# Patient Record
Sex: FEMALE | Race: OTHER | Employment: UNEMPLOYED | URBAN - METROPOLITAN AREA
[De-identification: names, ages, dates, MRNs, and addresses within clinical notes are randomized per-mention and may not be internally consistent; named-entity substitution may affect disease eponyms.]

---

## 2018-09-21 ENCOUNTER — OFFICE VISIT (OUTPATIENT)
Dept: PRIMARY CARE CLINIC | Age: 38
End: 2018-09-21

## 2018-09-21 VITALS
OXYGEN SATURATION: 99 % | SYSTOLIC BLOOD PRESSURE: 200 MMHG | HEIGHT: 62 IN | DIASTOLIC BLOOD PRESSURE: 100 MMHG | WEIGHT: 213 LBS | BODY MASS INDEX: 39.2 KG/M2 | TEMPERATURE: 98 F | HEART RATE: 88 BPM

## 2018-09-21 DIAGNOSIS — Z12.4 ENCOUNTER FOR SCREENING FOR CERVICAL CANCER: ICD-10-CM

## 2018-09-21 DIAGNOSIS — D22.9 CHANGE IN MOLE: ICD-10-CM

## 2018-09-21 DIAGNOSIS — Z23 FLU VACCINE NEED: ICD-10-CM

## 2018-09-21 DIAGNOSIS — R63.8 WEIGHT DISORDER: ICD-10-CM

## 2018-09-21 DIAGNOSIS — E55.9 VITAMIN D DEFICIENCY: ICD-10-CM

## 2018-09-21 DIAGNOSIS — I10 ESSENTIAL HYPERTENSION: ICD-10-CM

## 2018-09-21 DIAGNOSIS — R73.9 HYPERGLYCEMIA: ICD-10-CM

## 2018-09-21 DIAGNOSIS — H53.9 VISION ABNORMALITIES: ICD-10-CM

## 2018-09-21 LAB
A/G RATIO: 1.1 (ref 1.1–2.2)
ALBUMIN SERPL-MCNC: 4.1 G/DL (ref 3.4–5)
ALP BLD-CCNC: 104 U/L (ref 40–129)
ALT SERPL-CCNC: 83 U/L (ref 10–40)
ANION GAP SERPL CALCULATED.3IONS-SCNC: 12 MMOL/L (ref 3–16)
AST SERPL-CCNC: 61 U/L (ref 15–37)
BACTERIA: ABNORMAL /HPF
BASOPHILS ABSOLUTE: 0.1 K/UL (ref 0–0.2)
BASOPHILS RELATIVE PERCENT: 0.7 %
BILIRUB SERPL-MCNC: <0.2 MG/DL (ref 0–1)
BILIRUBIN URINE: NEGATIVE
BLOOD, URINE: ABNORMAL
BUN BLDV-MCNC: 14 MG/DL (ref 7–20)
CALCIUM SERPL-MCNC: 9.7 MG/DL (ref 8.3–10.6)
CASTS: ABNORMAL /LPF
CHLORIDE BLD-SCNC: 100 MMOL/L (ref 99–110)
CHOLESTEROL, TOTAL: 250 MG/DL (ref 0–199)
CLARITY: ABNORMAL
CO2: 25 MMOL/L (ref 21–32)
COLOR: ABNORMAL
COMMENT UA: ABNORMAL
CREAT SERPL-MCNC: 0.9 MG/DL (ref 0.6–1.1)
CRYSTALS, UA: ABNORMAL /HPF
EOSINOPHILS ABSOLUTE: 0.1 K/UL (ref 0–0.6)
EOSINOPHILS RELATIVE PERCENT: 0.7 %
EPITHELIAL CELLS, UA: 7 /HPF (ref 0–5)
GFR AFRICAN AMERICAN: >60
GFR NON-AFRICAN AMERICAN: >60
GLOBULIN: 3.6 G/DL
GLUCOSE BLD-MCNC: 122 MG/DL (ref 70–99)
GLUCOSE URINE: NEGATIVE MG/DL
HCG(URINE) PREGNANCY TEST: NEGATIVE
HCT VFR BLD CALC: 40.8 % (ref 36–48)
HDLC SERPL-MCNC: 46 MG/DL (ref 40–60)
HEMOGLOBIN: 12.9 G/DL (ref 12–16)
KETONES, URINE: ABNORMAL MG/DL
LDL CHOLESTEROL CALCULATED: 157 MG/DL
LEUKOCYTE ESTERASE, URINE: ABNORMAL
LYMPHOCYTES ABSOLUTE: 4.2 K/UL (ref 1–5.1)
LYMPHOCYTES RELATIVE PERCENT: 29.9 %
MCH RBC QN AUTO: 24.4 PG (ref 26–34)
MCHC RBC AUTO-ENTMCNC: 31.5 G/DL (ref 31–36)
MCV RBC AUTO: 77.5 FL (ref 80–100)
MICROSCOPIC EXAMINATION: YES
MONOCYTES ABSOLUTE: 0.9 K/UL (ref 0–1.3)
MONOCYTES RELATIVE PERCENT: 6.7 %
NEUTROPHILS ABSOLUTE: 8.7 K/UL (ref 1.7–7.7)
NEUTROPHILS RELATIVE PERCENT: 62 %
NITRITE, URINE: NEGATIVE
PDW BLD-RTO: 16.5 % (ref 12.4–15.4)
PH UA: 6
PLATELET # BLD: 294 K/UL (ref 135–450)
PMV BLD AUTO: 10.6 FL (ref 5–10.5)
POTASSIUM SERPL-SCNC: 4 MMOL/L (ref 3.5–5.1)
PROTEIN UA: >=300 MG/DL
RBC # BLD: 5.27 M/UL (ref 4–5.2)
RBC UA: >900 /HPF (ref 0–4)
SODIUM BLD-SCNC: 137 MMOL/L (ref 136–145)
SPECIFIC GRAVITY UA: >1.03
TOTAL PROTEIN: 7.7 G/DL (ref 6.4–8.2)
TRIGL SERPL-MCNC: 235 MG/DL (ref 0–150)
TSH SERPL DL<=0.05 MIU/L-ACNC: 1.74 UIU/ML (ref 0.27–4.2)
URINE TYPE: ABNORMAL
UROBILINOGEN, URINE: 0.2 E.U./DL
VITAMIN D 25-HYDROXY: 20 NG/ML
VLDLC SERPL CALC-MCNC: 47 MG/DL
WBC # BLD: 14 K/UL (ref 4–11)
WBC UA: 122 /HPF (ref 0–5)

## 2018-09-21 PROCEDURE — 90471 IMMUNIZATION ADMIN: CPT | Performed by: INTERNAL MEDICINE

## 2018-09-21 PROCEDURE — 90715 TDAP VACCINE 7 YRS/> IM: CPT | Performed by: INTERNAL MEDICINE

## 2018-09-21 PROCEDURE — G8427 DOCREV CUR MEDS BY ELIG CLIN: HCPCS | Performed by: INTERNAL MEDICINE

## 2018-09-21 PROCEDURE — G8417 CALC BMI ABV UP PARAM F/U: HCPCS | Performed by: INTERNAL MEDICINE

## 2018-09-21 PROCEDURE — 99204 OFFICE O/P NEW MOD 45 MIN: CPT | Performed by: INTERNAL MEDICINE

## 2018-09-21 PROCEDURE — 1036F TOBACCO NON-USER: CPT | Performed by: INTERNAL MEDICINE

## 2018-09-21 RX ORDER — HYDROCHLOROTHIAZIDE 25 MG/1
25 TABLET ORAL DAILY
Qty: 30 TABLET | Refills: 3 | Status: SHIPPED | OUTPATIENT
Start: 2018-09-21 | End: 2018-10-26 | Stop reason: SDUPTHER

## 2018-09-21 RX ORDER — IBUPROFEN 200 MG
200 TABLET ORAL EVERY 6 HOURS PRN
COMMUNITY
End: 2020-01-15

## 2018-09-21 RX ORDER — AMLODIPINE BESYLATE 5 MG/1
5 TABLET ORAL DAILY
Qty: 30 TABLET | Refills: 3 | Status: SHIPPED | OUTPATIENT
Start: 2018-09-21 | End: 2018-10-26 | Stop reason: SDUPTHER

## 2018-09-21 RX ORDER — M-VIT,TX,IRON,MINS/CALC/FOLIC 27MG-0.4MG
1 TABLET ORAL DAILY
COMMUNITY

## 2018-09-21 ASSESSMENT — ENCOUNTER SYMPTOMS
ABDOMINAL PAIN: 0
COUGH: 0
CONSTIPATION: 0
CHEST TIGHTNESS: 0
WHEEZING: 0
DIARRHEA: 0
GASTROINTESTINAL NEGATIVE: 1
EYES NEGATIVE: 1
BLOOD IN STOOL: 0
ABDOMINAL DISTENTION: 0
SHORTNESS OF BREATH: 0

## 2018-09-21 ASSESSMENT — PATIENT HEALTH QUESTIONNAIRE - PHQ9
1. LITTLE INTEREST OR PLEASURE IN DOING THINGS: 0
SUM OF ALL RESPONSES TO PHQ9 QUESTIONS 1 & 2: 0
SUM OF ALL RESPONSES TO PHQ QUESTIONS 1-9: 0
2. FEELING DOWN, DEPRESSED OR HOPELESS: 0
SUM OF ALL RESPONSES TO PHQ QUESTIONS 1-9: 0

## 2018-09-21 NOTE — PROGRESS NOTES
exhibits no edema. Neurological: She is alert and oriented to person, place, and time. She has normal strength. Skin: Skin is warm. Psychiatric: Her behavior is normal. Judgment normal.   Vitals reviewed. Assessment:      Hattie Mir was seen today for established new doctor, hypertension and mole. Diagnoses and all orders for this visit:    Essential hypertension  Start Norvasc + Hctz . Advised low salt diet   -     amLODIPine (NORVASC) 5 MG tablet; Take 1 tablet by mouth daily  -     hydrochlorothiazide (HYDRODIURIL) 25 MG tablet; Take 1 tablet by mouth daily  -     CBC Auto Differential; Future  -     Comprehensive Metabolic Panel; Future  -     Hemoglobin A1C; Future  -     Lipid Panel; Future  -     TSH without Reflex; Future  -     Urinalysis; Future  -     Pregnancy, Urine; Future  -     Vitamin D 25 Hydroxy; Future    Weight disorder    BMI > 35 . Advised reduced fanny diet     Vision abnormalities  Control BP first   -     Amb External Referral To Ophthalmology    Vitamin D deficiency  -     Vitamin D 25 Hydroxy;  Future    Hyperglycemia  FH of Diabetes   -     Hemoglobin A1C; Future    Encounter for screening for cervical cancer   -     [facility] - Norberto Daily, MD    Change in mole  -     External Referral To Dermatology    Flu vaccine need            Plan:              Tatyana Garzon MD

## 2018-09-22 LAB
ESTIMATED AVERAGE GLUCOSE: 131.2 MG/DL
HBA1C MFR BLD: 6.2 %

## 2018-10-26 ENCOUNTER — OFFICE VISIT (OUTPATIENT)
Dept: PRIMARY CARE CLINIC | Age: 38
End: 2018-10-26
Payer: MEDICARE

## 2018-10-26 VITALS
OXYGEN SATURATION: 98 % | BODY MASS INDEX: 37.91 KG/M2 | WEIGHT: 206 LBS | TEMPERATURE: 97.4 F | HEIGHT: 62 IN | DIASTOLIC BLOOD PRESSURE: 100 MMHG | HEART RATE: 84 BPM | SYSTOLIC BLOOD PRESSURE: 150 MMHG

## 2018-10-26 DIAGNOSIS — I10 ESSENTIAL HYPERTENSION: ICD-10-CM

## 2018-10-26 DIAGNOSIS — E55.9 VITAMIN D DEFICIENCY: ICD-10-CM

## 2018-10-26 DIAGNOSIS — E78.2 MIXED HYPERLIPIDEMIA: ICD-10-CM

## 2018-10-26 DIAGNOSIS — R73.9 HYPERGLYCEMIA: ICD-10-CM

## 2018-10-26 PROCEDURE — G8427 DOCREV CUR MEDS BY ELIG CLIN: HCPCS | Performed by: INTERNAL MEDICINE

## 2018-10-26 PROCEDURE — 1036F TOBACCO NON-USER: CPT | Performed by: INTERNAL MEDICINE

## 2018-10-26 PROCEDURE — G8482 FLU IMMUNIZE ORDER/ADMIN: HCPCS | Performed by: INTERNAL MEDICINE

## 2018-10-26 PROCEDURE — G8417 CALC BMI ABV UP PARAM F/U: HCPCS | Performed by: INTERNAL MEDICINE

## 2018-10-26 PROCEDURE — 99214 OFFICE O/P EST MOD 30 MIN: CPT | Performed by: INTERNAL MEDICINE

## 2018-10-26 RX ORDER — ERGOCALCIFEROL (VITAMIN D2) 1250 MCG
50000 CAPSULE ORAL WEEKLY
Qty: 4 CAPSULE | Refills: 4 | Status: SHIPPED | OUTPATIENT
Start: 2018-10-26 | End: 2020-01-15

## 2018-10-26 RX ORDER — HYDROCHLOROTHIAZIDE 25 MG/1
25 TABLET ORAL DAILY
Qty: 30 TABLET | Refills: 3 | Status: SHIPPED | OUTPATIENT
Start: 2018-10-26 | End: 2018-12-07 | Stop reason: SDUPTHER

## 2018-10-26 RX ORDER — AMLODIPINE BESYLATE 10 MG/1
10 TABLET ORAL DAILY
Qty: 30 TABLET | Refills: 2 | Status: SHIPPED | OUTPATIENT
Start: 2018-10-26 | End: 2018-12-07 | Stop reason: SDUPTHER

## 2018-10-26 ASSESSMENT — ENCOUNTER SYMPTOMS
ABDOMINAL PAIN: 0
COUGH: 0
GASTROINTESTINAL NEGATIVE: 1
ABDOMINAL DISTENTION: 0
EYES NEGATIVE: 1
CHEST TIGHTNESS: 0
BLOOD IN STOOL: 0
CONSTIPATION: 0
SHORTNESS OF BREATH: 0
WHEEZING: 0
DIARRHEA: 0

## 2018-12-07 ENCOUNTER — OFFICE VISIT (OUTPATIENT)
Dept: PRIMARY CARE CLINIC | Age: 38
End: 2018-12-07
Payer: COMMERCIAL

## 2018-12-07 VITALS
DIASTOLIC BLOOD PRESSURE: 90 MMHG | BODY MASS INDEX: 38.46 KG/M2 | WEIGHT: 209 LBS | SYSTOLIC BLOOD PRESSURE: 135 MMHG | HEIGHT: 62 IN

## 2018-12-07 DIAGNOSIS — R63.8 WEIGHT DISORDER: ICD-10-CM

## 2018-12-07 DIAGNOSIS — I10 ESSENTIAL HYPERTENSION: ICD-10-CM

## 2018-12-07 PROCEDURE — 99213 OFFICE O/P EST LOW 20 MIN: CPT | Performed by: INTERNAL MEDICINE

## 2018-12-07 RX ORDER — AMLODIPINE BESYLATE 10 MG/1
10 TABLET ORAL DAILY
Qty: 90 TABLET | Refills: 2 | Status: SHIPPED | OUTPATIENT
Start: 2018-12-07 | End: 2019-02-12 | Stop reason: SDUPTHER

## 2018-12-07 RX ORDER — HYDROCHLOROTHIAZIDE 25 MG/1
25 TABLET ORAL DAILY
Qty: 90 TABLET | Refills: 2 | Status: SHIPPED | OUTPATIENT
Start: 2018-12-07 | End: 2019-02-12

## 2018-12-07 ASSESSMENT — ENCOUNTER SYMPTOMS
WHEEZING: 0
CHEST TIGHTNESS: 0
ABDOMINAL PAIN: 0
ABDOMINAL DISTENTION: 0
BLOOD IN STOOL: 0
COUGH: 0
EYES NEGATIVE: 1
SHORTNESS OF BREATH: 0
CONSTIPATION: 0
DIARRHEA: 0
GASTROINTESTINAL NEGATIVE: 1

## 2019-02-08 ENCOUNTER — TELEPHONE (OUTPATIENT)
Dept: PRIMARY CARE CLINIC | Age: 39
End: 2019-02-08

## 2019-02-12 ENCOUNTER — OFFICE VISIT (OUTPATIENT)
Dept: PRIMARY CARE CLINIC | Age: 39
End: 2019-02-12
Payer: COMMERCIAL

## 2019-02-12 VITALS
BODY MASS INDEX: 39.38 KG/M2 | TEMPERATURE: 98.1 F | OXYGEN SATURATION: 97 % | HEIGHT: 62 IN | SYSTOLIC BLOOD PRESSURE: 150 MMHG | DIASTOLIC BLOOD PRESSURE: 95 MMHG | WEIGHT: 214 LBS | HEART RATE: 98 BPM

## 2019-02-12 DIAGNOSIS — Z01.818 PREOP EXAMINATION: ICD-10-CM

## 2019-02-12 DIAGNOSIS — R63.8 WEIGHT DISORDER: ICD-10-CM

## 2019-02-12 DIAGNOSIS — I10 ESSENTIAL HYPERTENSION: ICD-10-CM

## 2019-02-12 LAB
A/G RATIO: 1 (ref 1.1–2.2)
ALBUMIN SERPL-MCNC: 3.9 G/DL (ref 3.4–5)
ALP BLD-CCNC: 94 U/L (ref 40–129)
ALT SERPL-CCNC: 103 U/L (ref 10–40)
ANION GAP SERPL CALCULATED.3IONS-SCNC: 15 MMOL/L (ref 3–16)
AST SERPL-CCNC: 61 U/L (ref 15–37)
BASOPHILS ABSOLUTE: 0.2 K/UL (ref 0–0.2)
BASOPHILS RELATIVE PERCENT: 1.2 %
BILIRUB SERPL-MCNC: 0.3 MG/DL (ref 0–1)
BUN BLDV-MCNC: 11 MG/DL (ref 7–20)
CALCIUM SERPL-MCNC: 10.3 MG/DL (ref 8.3–10.6)
CHLORIDE BLD-SCNC: 93 MMOL/L (ref 99–110)
CO2: 28 MMOL/L (ref 21–32)
CREAT SERPL-MCNC: 0.7 MG/DL (ref 0.6–1.1)
EOSINOPHILS ABSOLUTE: 0.1 K/UL (ref 0–0.6)
EOSINOPHILS RELATIVE PERCENT: 0.8 %
GFR AFRICAN AMERICAN: >60
GFR NON-AFRICAN AMERICAN: >60
GLOBULIN: 3.9 G/DL
GLUCOSE BLD-MCNC: 142 MG/DL (ref 70–99)
HCT VFR BLD CALC: 44.9 % (ref 36–48)
HEMOGLOBIN: 14.6 G/DL (ref 12–16)
LYMPHOCYTES ABSOLUTE: 3.8 K/UL (ref 1–5.1)
LYMPHOCYTES RELATIVE PERCENT: 26.8 %
MCH RBC QN AUTO: 27.1 PG (ref 26–34)
MCHC RBC AUTO-ENTMCNC: 32.5 G/DL (ref 31–36)
MCV RBC AUTO: 83.5 FL (ref 80–100)
MONOCYTES ABSOLUTE: 0.9 K/UL (ref 0–1.3)
MONOCYTES RELATIVE PERCENT: 6.5 %
NEUTROPHILS ABSOLUTE: 9 K/UL (ref 1.7–7.7)
NEUTROPHILS RELATIVE PERCENT: 64.7 %
PDW BLD-RTO: 15.9 % (ref 12.4–15.4)
PLATELET # BLD: 361 K/UL (ref 135–450)
PMV BLD AUTO: 10.5 FL (ref 5–10.5)
POTASSIUM SERPL-SCNC: 3.8 MMOL/L (ref 3.5–5.1)
RBC # BLD: 5.38 M/UL (ref 4–5.2)
SODIUM BLD-SCNC: 136 MMOL/L (ref 136–145)
TOTAL PROTEIN: 7.8 G/DL (ref 6.4–8.2)
WBC # BLD: 14 K/UL (ref 4–11)

## 2019-02-12 PROCEDURE — 99214 OFFICE O/P EST MOD 30 MIN: CPT | Performed by: INTERNAL MEDICINE

## 2019-02-12 RX ORDER — AMLODIPINE BESYLATE 10 MG/1
10 TABLET ORAL DAILY
Qty: 90 TABLET | Refills: 2 | Status: SHIPPED | OUTPATIENT
Start: 2019-02-12 | End: 2019-03-29 | Stop reason: SDUPTHER

## 2019-02-12 RX ORDER — LOSARTAN POTASSIUM AND HYDROCHLOROTHIAZIDE 25; 100 MG/1; MG/1
1 TABLET ORAL DAILY
Qty: 30 TABLET | Refills: 3 | Status: SHIPPED | OUTPATIENT
Start: 2019-02-12 | End: 2019-03-29 | Stop reason: SDUPTHER

## 2019-02-12 ASSESSMENT — PATIENT HEALTH QUESTIONNAIRE - PHQ9
2. FEELING DOWN, DEPRESSED OR HOPELESS: 0
SUM OF ALL RESPONSES TO PHQ QUESTIONS 1-9: 0
SUM OF ALL RESPONSES TO PHQ9 QUESTIONS 1 & 2: 0
SUM OF ALL RESPONSES TO PHQ QUESTIONS 1-9: 0
1. LITTLE INTEREST OR PLEASURE IN DOING THINGS: 0

## 2019-02-12 ASSESSMENT — ENCOUNTER SYMPTOMS
GASTROINTESTINAL NEGATIVE: 1
ABDOMINAL DISTENTION: 0
COUGH: 0
WHEEZING: 0
BLOOD IN STOOL: 0
CONSTIPATION: 0
SHORTNESS OF BREATH: 0
ABDOMINAL PAIN: 0
CHEST TIGHTNESS: 0
DIARRHEA: 0
EYES NEGATIVE: 1

## 2019-03-29 ENCOUNTER — OFFICE VISIT (OUTPATIENT)
Dept: PRIMARY CARE CLINIC | Age: 39
End: 2019-03-29
Payer: COMMERCIAL

## 2019-03-29 VITALS
SYSTOLIC BLOOD PRESSURE: 120 MMHG | DIASTOLIC BLOOD PRESSURE: 70 MMHG | HEART RATE: 97 BPM | HEIGHT: 62 IN | BODY MASS INDEX: 40.12 KG/M2 | WEIGHT: 218 LBS

## 2019-03-29 DIAGNOSIS — I10 ESSENTIAL HYPERTENSION: ICD-10-CM

## 2019-03-29 PROCEDURE — 99213 OFFICE O/P EST LOW 20 MIN: CPT | Performed by: INTERNAL MEDICINE

## 2019-03-29 RX ORDER — AMLODIPINE BESYLATE 10 MG/1
10 TABLET ORAL DAILY
Qty: 90 TABLET | Refills: 5 | Status: SHIPPED | OUTPATIENT
Start: 2019-03-29 | End: 2020-01-15 | Stop reason: SDUPTHER

## 2019-03-29 RX ORDER — LOSARTAN POTASSIUM AND HYDROCHLOROTHIAZIDE 25; 100 MG/1; MG/1
1 TABLET ORAL DAILY
Qty: 90 TABLET | Refills: 5 | Status: SHIPPED | OUTPATIENT
Start: 2019-03-29 | End: 2020-01-15

## 2019-03-29 ASSESSMENT — ENCOUNTER SYMPTOMS
SHORTNESS OF BREATH: 0
DIARRHEA: 0
CHEST TIGHTNESS: 0
EYES NEGATIVE: 1
ABDOMINAL DISTENTION: 0
COUGH: 0
BLOOD IN STOOL: 0
ABDOMINAL PAIN: 0
CONSTIPATION: 0
WHEEZING: 0
GASTROINTESTINAL NEGATIVE: 1

## 2019-11-22 ENCOUNTER — NURSE TRIAGE (OUTPATIENT)
Dept: OTHER | Facility: CLINIC | Age: 39
End: 2019-11-22

## 2019-11-22 RX ORDER — GUAIFENESIN 600 MG/1
600 TABLET, EXTENDED RELEASE ORAL 2 TIMES DAILY
Qty: 14 TABLET | Refills: 0 | Status: SHIPPED | OUTPATIENT
Start: 2019-11-22 | End: 2019-11-29

## 2019-11-22 RX ORDER — AZITHROMYCIN 250 MG/1
250 TABLET, FILM COATED ORAL SEE ADMIN INSTRUCTIONS
Qty: 6 TABLET | Refills: 0 | Status: SHIPPED | OUTPATIENT
Start: 2019-11-22 | End: 2019-11-27

## 2019-11-27 ENCOUNTER — TELEPHONE (OUTPATIENT)
Dept: PRIMARY CARE CLINIC | Age: 39
End: 2019-11-27

## 2019-12-26 ENCOUNTER — TELEPHONE (OUTPATIENT)
Dept: PRIMARY CARE CLINIC | Age: 39
End: 2019-12-26

## 2019-12-31 DIAGNOSIS — J06.9 URI, ACUTE: Primary | ICD-10-CM

## 2019-12-31 RX ORDER — AMOXICILLIN 500 MG/1
500 CAPSULE ORAL 3 TIMES DAILY
Qty: 21 CAPSULE | Refills: 0 | Status: SHIPPED | OUTPATIENT
Start: 2019-12-31 | End: 2020-01-07

## 2019-12-31 RX ORDER — GUAIFENESIN 600 MG/1
600 TABLET, EXTENDED RELEASE ORAL 2 TIMES DAILY
Qty: 20 TABLET | Refills: 0 | Status: SHIPPED | OUTPATIENT
Start: 2019-12-31 | End: 2020-01-10

## 2020-01-15 ENCOUNTER — OFFICE VISIT (OUTPATIENT)
Dept: PRIMARY CARE CLINIC | Age: 40
End: 2020-01-15
Payer: COMMERCIAL

## 2020-01-15 VITALS
DIASTOLIC BLOOD PRESSURE: 102 MMHG | SYSTOLIC BLOOD PRESSURE: 161 MMHG | HEART RATE: 95 BPM | WEIGHT: 224 LBS | BODY MASS INDEX: 41.22 KG/M2 | HEIGHT: 62 IN

## 2020-01-15 PROCEDURE — G0008 ADMIN INFLUENZA VIRUS VAC: HCPCS | Performed by: INTERNAL MEDICINE

## 2020-01-15 PROCEDURE — 90686 IIV4 VACC NO PRSV 0.5 ML IM: CPT | Performed by: INTERNAL MEDICINE

## 2020-01-15 PROCEDURE — 99214 OFFICE O/P EST MOD 30 MIN: CPT | Performed by: INTERNAL MEDICINE

## 2020-01-15 RX ORDER — AMLODIPINE BESYLATE 10 MG/1
10 TABLET ORAL DAILY
Qty: 90 TABLET | Refills: 5 | Status: SHIPPED | OUTPATIENT
Start: 2020-01-15 | End: 2020-03-04 | Stop reason: SDUPTHER

## 2020-01-15 RX ORDER — HYDROCHLOROTHIAZIDE 25 MG/1
25 TABLET ORAL EVERY MORNING
Qty: 30 TABLET | Refills: 5 | Status: SHIPPED | OUTPATIENT
Start: 2020-01-15 | End: 2020-03-04 | Stop reason: SDUPTHER

## 2020-01-15 ASSESSMENT — PATIENT HEALTH QUESTIONNAIRE - PHQ9
SUM OF ALL RESPONSES TO PHQ9 QUESTIONS 1 & 2: 0
1. LITTLE INTEREST OR PLEASURE IN DOING THINGS: 0
2. FEELING DOWN, DEPRESSED OR HOPELESS: 0
SUM OF ALL RESPONSES TO PHQ QUESTIONS 1-9: 0
SUM OF ALL RESPONSES TO PHQ QUESTIONS 1-9: 0

## 2020-01-15 ASSESSMENT — ENCOUNTER SYMPTOMS
ABDOMINAL PAIN: 0
BLOOD IN STOOL: 0
CHEST TIGHTNESS: 0
DIARRHEA: 0
EYES NEGATIVE: 1
GASTROINTESTINAL NEGATIVE: 1
CONSTIPATION: 0
ABDOMINAL DISTENTION: 0
WHEEZING: 0
SHORTNESS OF BREATH: 0
COUGH: 0

## 2020-01-15 NOTE — PROGRESS NOTES
Subjective:      Patient ID: Dorita Jack is a 42 y.o. female. 1/15/2020 Patient presents with:  Hypertension  Check-Up            3/29/19 Patient presents with:  Hypertension: tolerating hyzaar fine             Last seen  2/12/19 Patient presents with:  Pre-op Exam: Pre-op exam surgery scheduled 2/15/19 left ear lesion Dr Yan Velasquez    Never had any surgery before     No FH of anaesthesia problems   No personal or FH of bleeding diorders          9/21/18 Patient presents with:  Established New Doctor        Hypertension   Pertinent negatives include no chest pain, headaches or shortness of breath. Review of Systems   Constitutional: Negative for activity change, appetite change, fatigue, fever and unexpected weight change. Flu vac  1/20    Tdap  9/18   HENT: Negative. Dental care reg    Eyes: Negative. Negative for visual disturbance. Eye exam >>. Respiratory: Negative for cough, chest tightness, shortness of breath and wheezing. Does not smoke . No Etoh   No rec drugs     No Asthma    Cardiovascular: Negative. Negative for chest pain and leg swelling. HTN  > 5 yrs ,     FH of HTN ; No FH of CAD    Gastrointestinal: Negative. Negative for abdominal distention, abdominal pain, blood in stool, constipation and diarrhea. No FH of Ca colon    Endocrine:        Dad with Diabetes    Genitourinary: Negative for dysuria, frequency, menstrual problem, urgency and vaginal discharge. LMP 12/15/19     No Children     Reg menstrual cycles         No FH of breast cancer    Musculoskeletal: Negative. Skin:        S/p surgery for Mole behind left ear   Allergic/Immunologic: Negative for environmental allergies and food allergies. Neurological: Negative for dizziness, weakness and headaches. Psychiatric/Behavioral: Negative for behavioral problems, decreased concentration and sleep disturbance. The patient is not nervous/anxious.         Objective:   Physical

## 2020-03-04 ENCOUNTER — OFFICE VISIT (OUTPATIENT)
Dept: PRIMARY CARE CLINIC | Age: 40
End: 2020-03-04
Payer: COMMERCIAL

## 2020-03-04 VITALS
HEIGHT: 62 IN | SYSTOLIC BLOOD PRESSURE: 140 MMHG | BODY MASS INDEX: 40.3 KG/M2 | WEIGHT: 219 LBS | DIASTOLIC BLOOD PRESSURE: 100 MMHG | HEART RATE: 96 BPM

## 2020-03-04 LAB
A/G RATIO: 1 (ref 1.1–2.2)
ALBUMIN SERPL-MCNC: 3.5 G/DL (ref 3.4–5)
ALP BLD-CCNC: 110 U/L (ref 40–129)
ALT SERPL-CCNC: 154 U/L (ref 10–40)
ANION GAP SERPL CALCULATED.3IONS-SCNC: 16 MMOL/L (ref 3–16)
AST SERPL-CCNC: 110 U/L (ref 15–37)
BACTERIA: ABNORMAL /HPF
BASOPHILS ABSOLUTE: 0.1 K/UL (ref 0–0.2)
BASOPHILS RELATIVE PERCENT: 0.6 %
BILIRUB SERPL-MCNC: 0.3 MG/DL (ref 0–1)
BILIRUBIN URINE: NEGATIVE
BLOOD, URINE: ABNORMAL
BUN BLDV-MCNC: 12 MG/DL (ref 7–20)
CALCIUM SERPL-MCNC: 10.2 MG/DL (ref 8.3–10.6)
CHLORIDE BLD-SCNC: 96 MMOL/L (ref 99–110)
CHOLESTEROL, TOTAL: 334 MG/DL (ref 0–199)
CLARITY: ABNORMAL
CO2: 26 MMOL/L (ref 21–32)
COLOR: YELLOW
CREAT SERPL-MCNC: 0.8 MG/DL (ref 0.6–1.1)
EOSINOPHILS ABSOLUTE: 0.1 K/UL (ref 0–0.6)
EOSINOPHILS RELATIVE PERCENT: 0.9 %
EPITHELIAL CELLS, UA: 7 /HPF (ref 0–5)
GFR AFRICAN AMERICAN: >60
GFR NON-AFRICAN AMERICAN: >60
GLOBULIN: 3.6 G/DL
GLUCOSE BLD-MCNC: 154 MG/DL (ref 70–99)
GLUCOSE URINE: NEGATIVE MG/DL
HCG(URINE) PREGNANCY TEST: NEGATIVE
HCT VFR BLD CALC: 45.1 % (ref 36–48)
HDLC SERPL-MCNC: 53 MG/DL (ref 40–60)
HEMOGLOBIN: 14.7 G/DL (ref 12–16)
HYALINE CASTS: 10 /LPF (ref 0–8)
KETONES, URINE: NEGATIVE MG/DL
LDL CHOLESTEROL CALCULATED: ABNORMAL MG/DL
LDL CHOLESTEROL DIRECT: 272 MG/DL
LEUKOCYTE ESTERASE, URINE: ABNORMAL
LYMPHOCYTES ABSOLUTE: 4.7 K/UL (ref 1–5.1)
LYMPHOCYTES RELATIVE PERCENT: 34.3 %
MCH RBC QN AUTO: 27.8 PG (ref 26–34)
MCHC RBC AUTO-ENTMCNC: 32.7 G/DL (ref 31–36)
MCV RBC AUTO: 85.1 FL (ref 80–100)
MICROSCOPIC EXAMINATION: YES
MONOCYTES ABSOLUTE: 0.8 K/UL (ref 0–1.3)
MONOCYTES RELATIVE PERCENT: 5.8 %
NEUTROPHILS ABSOLUTE: 8 K/UL (ref 1.7–7.7)
NEUTROPHILS RELATIVE PERCENT: 58.4 %
NITRITE, URINE: NEGATIVE
PDW BLD-RTO: 14.5 % (ref 12.4–15.4)
PH UA: 6.5 (ref 5–8)
PLATELET # BLD: 326 K/UL (ref 135–450)
PMV BLD AUTO: 11 FL (ref 5–10.5)
POTASSIUM SERPL-SCNC: 3.7 MMOL/L (ref 3.5–5.1)
PROTEIN UA: >=300 MG/DL
RBC # BLD: 5.29 M/UL (ref 4–5.2)
RBC UA: 5 /HPF (ref 0–4)
SODIUM BLD-SCNC: 138 MMOL/L (ref 136–145)
SPECIFIC GRAVITY UA: 1.02 (ref 1–1.03)
TOTAL PROTEIN: 7.1 G/DL (ref 6.4–8.2)
TRIGL SERPL-MCNC: 302 MG/DL (ref 0–150)
TSH SERPL DL<=0.05 MIU/L-ACNC: 2.14 UIU/ML (ref 0.27–4.2)
URINE TYPE: ABNORMAL
UROBILINOGEN, URINE: 0.2 E.U./DL
VITAMIN D 25-HYDROXY: 13.9 NG/ML
VLDLC SERPL CALC-MCNC: ABNORMAL MG/DL
WBC # BLD: 13.8 K/UL (ref 4–11)
WBC UA: 44 /HPF (ref 0–5)

## 2020-03-04 PROCEDURE — 99213 OFFICE O/P EST LOW 20 MIN: CPT | Performed by: INTERNAL MEDICINE

## 2020-03-04 RX ORDER — LOSARTAN POTASSIUM 100 MG/1
100 TABLET ORAL DAILY
Qty: 30 TABLET | Refills: 5 | Status: SHIPPED | OUTPATIENT
Start: 2020-03-04 | End: 2020-08-27

## 2020-03-04 RX ORDER — AMLODIPINE BESYLATE 10 MG/1
10 TABLET ORAL DAILY
Qty: 90 TABLET | Refills: 5 | Status: SHIPPED | OUTPATIENT
Start: 2020-03-04 | End: 2021-02-10 | Stop reason: SDUPTHER

## 2020-03-04 RX ORDER — HYDROCHLOROTHIAZIDE 25 MG/1
25 TABLET ORAL EVERY MORNING
Qty: 30 TABLET | Refills: 5 | Status: SHIPPED | OUTPATIENT
Start: 2020-03-04 | End: 2021-01-26 | Stop reason: SDUPTHER

## 2020-03-04 ASSESSMENT — ENCOUNTER SYMPTOMS
CONSTIPATION: 0
ABDOMINAL DISTENTION: 0
BLOOD IN STOOL: 0
EYES NEGATIVE: 1
WHEEZING: 0
DIARRHEA: 0
SHORTNESS OF BREATH: 0
GASTROINTESTINAL NEGATIVE: 1
CHEST TIGHTNESS: 0
ABDOMINAL PAIN: 0
COUGH: 0

## 2020-03-04 NOTE — PROGRESS NOTES
Subjective:      Patient ID: Leatha Harrison is a 42 y.o. female. 3/4/2020 Patient presents with:  1 Month Follow-Up    Taking Hctz with Norvasc . No issues      Got labs ordered last visit done today ! Last seen  1/15/2020 Patient presents with:  Hypertension  Check-Up            3/29/19 Patient presents with:  Hypertension: tolerating hyzaar fine             Last seen  2/12/19 Patient presents with:  Pre-op Exam: Pre-op exam surgery scheduled 2/15/19 left ear lesion Dr Elzbieta Cadet    Never had any surgery before     No FH of anaesthesia problems   No personal or FH of bleeding diorders          9/21/18 Patient presents with:  Established New Doctor        Hypertension   Pertinent negatives include no chest pain, headaches or shortness of breath. Review of Systems   Constitutional: Negative for activity change, appetite change, fatigue, fever and unexpected weight change. Flu vac  1/20    Tdap  9/18   HENT: Negative. Dental care reg    Eyes: Negative. Negative for visual disturbance. Eye exam >>. Respiratory: Negative for cough, chest tightness, shortness of breath and wheezing. Does not smoke . No Etoh   No rec drugs     No Asthma    Cardiovascular: Negative. Negative for chest pain and leg swelling. HTN  > 5 yrs ,     FH of HTN ; No FH of CAD    Gastrointestinal: Negative. Negative for abdominal distention, abdominal pain, blood in stool, constipation and diarrhea. No FH of Ca colon    Endocrine:        Dad with Diabetes    Genitourinary: Negative for dysuria, frequency, menstrual problem, urgency and vaginal discharge. LMP 12/15/19     No Children     Reg menstrual cycles         No FH of breast cancer    Musculoskeletal: Negative. Skin:        S/p surgery for Mole behind left ear   Allergic/Immunologic: Negative for environmental allergies and food allergies. Neurological: Negative for dizziness, weakness and headaches.

## 2020-03-05 LAB
ESTIMATED AVERAGE GLUCOSE: 205.9 MG/DL
HBA1C MFR BLD: 8.8 %

## 2020-03-05 RX ORDER — METFORMIN HYDROCHLORIDE 500 MG/1
500 TABLET, EXTENDED RELEASE ORAL
Qty: 30 TABLET | Refills: 3 | Status: SHIPPED | OUTPATIENT
Start: 2020-03-05 | End: 2020-05-22 | Stop reason: SDUPTHER

## 2020-03-05 RX ORDER — ERGOCALCIFEROL 1.25 MG/1
50000 CAPSULE ORAL WEEKLY
Qty: 12 CAPSULE | Refills: 1 | Status: SHIPPED | OUTPATIENT
Start: 2020-03-05 | End: 2021-02-10 | Stop reason: SDUPTHER

## 2020-03-05 RX ORDER — ATORVASTATIN CALCIUM 40 MG/1
40 TABLET, FILM COATED ORAL DAILY
Qty: 90 TABLET | Refills: 5 | Status: SHIPPED | OUTPATIENT
Start: 2020-03-05 | End: 2021-02-10 | Stop reason: SDUPTHER

## 2020-05-22 RX ORDER — METFORMIN HYDROCHLORIDE 500 MG/1
500 TABLET, EXTENDED RELEASE ORAL
Qty: 30 TABLET | Refills: 3 | Status: SHIPPED | OUTPATIENT
Start: 2020-05-22 | End: 2020-10-20

## 2020-08-27 RX ORDER — LOSARTAN POTASSIUM 100 MG/1
TABLET ORAL
Qty: 30 TABLET | Refills: 4 | Status: SHIPPED | OUTPATIENT
Start: 2020-08-27 | End: 2021-02-01

## 2020-10-20 RX ORDER — METFORMIN HYDROCHLORIDE 500 MG/1
TABLET, EXTENDED RELEASE ORAL
Qty: 30 TABLET | Refills: 2 | Status: SHIPPED | OUTPATIENT
Start: 2020-10-20 | End: 2021-01-21

## 2020-10-20 NOTE — TELEPHONE ENCOUNTER
Medication:   Requested Prescriptions     Pending Prescriptions Disp Refills    metFORMIN (GLUCOPHAGE-XR) 500 MG extended release tablet [Pharmacy Med Name: metFORMIN HCL  MG TABLET] 30 tablet 2     Sig: TAKE ONE TABLET BY MOUTH DAILY WITH BREAKFAST       Last Filled:      Patient Phone Number: [phone] (home)     Last appt: 3/4/2020   Next appt: Visit date not found    Last Labs DM:   Lab Results   Component Value Date    LABA1C 8.8 03/04/2020

## 2020-11-02 ENCOUNTER — TELEPHONE (OUTPATIENT)
Dept: ADMINISTRATIVE | Age: 40
End: 2020-11-02

## 2021-01-21 DIAGNOSIS — E11.9 NEWLY DIAGNOSED DIABETES (HCC): ICD-10-CM

## 2021-01-21 RX ORDER — METFORMIN HYDROCHLORIDE 500 MG/1
TABLET, EXTENDED RELEASE ORAL
Qty: 90 TABLET | Refills: 1 | Status: SHIPPED | OUTPATIENT
Start: 2021-01-21 | End: 2021-02-10 | Stop reason: SDUPTHER

## 2021-01-21 NOTE — TELEPHONE ENCOUNTER
Medication:   Requested Prescriptions     Pending Prescriptions Disp Refills    metFORMIN (GLUCOPHAGE-XR) 500 MG extended release tablet [Pharmacy Med Name: metFORMIN HCL  MG TABLET] 90 tablet 1     Sig: TAKE ONE TABLET BY MOUTH DAILY WITH BREAKFAST        Last Filled:      Patient Phone Number: [phone] (home)     Last appt: 3/4/2020   Next appt: Visit date not found    Last OARRS: No flowsheet data found.

## 2021-01-21 NOTE — TELEPHONE ENCOUNTER
Medication:   Requested Prescriptions     Pending Prescriptions Disp Refills    metFORMIN (GLUCOPHAGE-XR) 500 MG extended release tablet [Pharmacy Med Name: metFORMIN HCL  MG TABLET] 90 tablet 1     Sig: TAKE ONE TABLET BY MOUTH DAILY WITH BREAKFAST        Last Filled:      Patient Phone Number: [phone] (home)     Last appt: 3/4/2020   Next appt: Visit date not found    Last OARRS: No flowsheet data found.

## 2021-01-26 DIAGNOSIS — I10 ESSENTIAL HYPERTENSION: ICD-10-CM

## 2021-01-26 RX ORDER — HYDROCHLOROTHIAZIDE 25 MG/1
25 TABLET ORAL EVERY MORNING
Qty: 30 TABLET | Refills: 5 | Status: SHIPPED | OUTPATIENT
Start: 2021-01-26 | End: 2021-02-10

## 2021-01-26 NOTE — TELEPHONE ENCOUNTER
----- Message from Seth Miller sent at 1/26/2021 11:13 AM EST -----  Subject: Refill Request    QUESTIONS  Name of Medication? hydroCHLOROthiazide (HYDRODIURIL) 25 MG tablet  Patient-reported dosage and instructions? 1 tablet per morning  How many days do you have left? Unknown  Preferred Pharmacy? [facility]  Pharmacy phone number (if available)? [phone]  Additional Information for Provider? 90 day  ---------------------------------------------------------------------------  --------------  CALL BACK INFO  What is the best way for the office to contact you? OK to leave message on   voicemail   OK to respond with electronic message via [employer] portal (only for   patients who have registered [employer] account)  Preferred Call Back Phone Number?  [phone]

## 2021-01-31 DIAGNOSIS — I10 ESSENTIAL HYPERTENSION: ICD-10-CM

## 2021-02-01 RX ORDER — LOSARTAN POTASSIUM 100 MG/1
TABLET ORAL
Qty: 30 TABLET | Refills: 3 | Status: SHIPPED | OUTPATIENT
Start: 2021-02-01 | End: 2021-02-10

## 2021-02-01 NOTE — TELEPHONE ENCOUNTER
Medication:   Requested Prescriptions     Pending Prescriptions Disp Refills    losartan (COZAAR) 100 MG tablet [Pharmacy Med Name: LOSARTAN POTASSIUM 100 MG TAB] 30 tablet 3     Sig: TAKE ONE TABLET BY MOUTH DAILY        Last Filled:      Patient Phone Number: [phone] (home)     Last appt: 3/4/2020   Next appt: 2/10/2021    Last OARRS: No flowsheet data found.

## 2021-02-10 ENCOUNTER — VIRTUAL VISIT (OUTPATIENT)
Dept: PRIMARY CARE CLINIC | Age: 41
End: 2021-02-10
Payer: COMMERCIAL

## 2021-02-10 DIAGNOSIS — I10 ESSENTIAL HYPERTENSION: ICD-10-CM

## 2021-02-10 DIAGNOSIS — E78.2 MIXED HYPERLIPIDEMIA: ICD-10-CM

## 2021-02-10 DIAGNOSIS — Z86.39 H/O VITAMIN D DEFICIENCY: ICD-10-CM

## 2021-02-10 DIAGNOSIS — R74.8 ELEVATED LIVER ENZYMES: ICD-10-CM

## 2021-02-10 DIAGNOSIS — E11.9 NEWLY DIAGNOSED DIABETES (HCC): ICD-10-CM

## 2021-02-10 DIAGNOSIS — Z71.85 VACCINE COUNSELING: ICD-10-CM

## 2021-02-10 PROCEDURE — 99214 OFFICE O/P EST MOD 30 MIN: CPT | Performed by: INTERNAL MEDICINE

## 2021-02-10 RX ORDER — ERGOCALCIFEROL 1.25 MG/1
50000 CAPSULE ORAL WEEKLY
Qty: 12 CAPSULE | Refills: 1 | Status: SHIPPED | OUTPATIENT
Start: 2021-02-10

## 2021-02-10 RX ORDER — ATORVASTATIN CALCIUM 40 MG/1
40 TABLET, FILM COATED ORAL DAILY
Qty: 90 TABLET | Refills: 5 | Status: SHIPPED | OUTPATIENT
Start: 2021-02-10

## 2021-02-10 RX ORDER — METFORMIN HYDROCHLORIDE 500 MG/1
500 TABLET, EXTENDED RELEASE ORAL
Qty: 90 TABLET | Refills: 3 | Status: SHIPPED | OUTPATIENT
Start: 2021-02-10

## 2021-02-10 RX ORDER — LOSARTAN POTASSIUM AND HYDROCHLOROTHIAZIDE 25; 100 MG/1; MG/1
1 TABLET ORAL DAILY
Qty: 90 TABLET | Refills: 2 | Status: SHIPPED | OUTPATIENT
Start: 2021-02-10

## 2021-02-10 RX ORDER — AMLODIPINE BESYLATE 10 MG/1
10 TABLET ORAL DAILY
Qty: 90 TABLET | Refills: 5 | Status: SHIPPED | OUTPATIENT
Start: 2021-02-10

## 2021-02-10 ASSESSMENT — ENCOUNTER SYMPTOMS
COUGH: 0
ABDOMINAL DISTENTION: 0
SHORTNESS OF BREATH: 0
CHEST TIGHTNESS: 0
GASTROINTESTINAL NEGATIVE: 1
BLOOD IN STOOL: 0
DIARRHEA: 0
ABDOMINAL PAIN: 0
WHEEZING: 0
CONSTIPATION: 0
EYES NEGATIVE: 1

## 2021-02-10 ASSESSMENT — PATIENT HEALTH QUESTIONNAIRE - PHQ9
SUM OF ALL RESPONSES TO PHQ QUESTIONS 1-9: 0
SUM OF ALL RESPONSES TO PHQ QUESTIONS 1-9: 0

## 2021-02-10 NOTE — PROGRESS NOTES
Mole behind left ear   Allergic/Immunologic: Negative for environmental allergies and food allergies. Neurological: Negative for dizziness, weakness and headaches. Psychiatric/Behavioral: Negative for behavioral problems, decreased concentration and sleep disturbance. The patient is not nervous/anxious. Objective:   Physical Exam   Constitutional: She is oriented to person, place, and time. Neurological: She is alert and oriented to person, place, and time. Psychiatric: Her behavior is normal.   Vitals reviewed. Assessment:      Devin Garnica is a 47 y.o. female evaluated via telephone on 2/10/2021. Consent:  She and/or health care decision maker is aware that that she may receive a bill for this telephone service, depending on her insurance coverage, and has provided verbal consent to proceed: Yes      Documentation:  I communicated with the patient  about this . Details of this discussion including any medical advice provided: as noted       I affirm this is a Patient Initiated Episode with a Patient who has not had a related appointment within my department in the past 7 days or scheduled within the next 24 hours. Patient identification was verified at the start of the visit: Yes    Total Time: minutes: 21-30 minutes    Note: not billable if this call serves to triage the patient into an appointment for the relevant concern      Kylie Pacheco was seen today for hypertension, diabetes and hyperlipidemia. Diagnoses and all orders for this visit:    Essential hypertension  Reportedly controlled . Cautioned about risks of losartan in preg . ( Not sexually active )  -     losartan-hydroCHLOROthiazide (HYZAAR) 100-25 MG per tablet; Take 1 tablet by mouth daily  -     amLODIPine (NORVASC) 10 MG tablet; Take 1 tablet by mouth daily  -     CBC Auto Differential; Future  -     Comprehensive Metabolic Panel; Future  -     Lipid Panel;  Future  -     Urinalysis; Future  -     Pregnancy, Urine; Future    Newly diagnosed diabetes ([facility] [address] last A1C 8.8 !  -     metFORMIN (GLUCOPHAGE-XR) 500 MG extended release tablet; Take 1 tablet by mouth daily (with breakfast)  -     atorvastatin (LIPITOR) 40 MG tablet; Take 1 tablet by mouth daily  -     Comprehensive Metabolic Panel; Future  -     Hemoglobin A1C; Future  -     Urinalysis; Future    Mixed hyperlipidemia  LDL > 240! Check Lipids and adjust dose   -     atorvastatin (LIPITOR) 40 MG tablet; Take 1 tablet by mouth daily  -     Lipid Panel; Future  -     TSH without Reflex; Future    Elevated liver enzymes  Check Hp C ab ; Hep B ab   -     Comprehensive Metabolic Panel; Future    H/O vitamin D deficiency  -     vitamin D (ERGOCALCIFEROL) 1.25 MG (83912 UT) CAPS capsule; Take 1 capsule by mouth once a week  -     Vitamin D 25 Hydroxy; Future    Vaccine counseling  Needs Flu vac ; Covid vaccine when available             Weight disorder  BMI > 40 . Has lost > 25 lbs .  Continue low carb diet + exercise reg             Plan:              Ashley Li MD

## 2021-05-03 ENCOUNTER — TELEPHONE (OUTPATIENT)
Dept: PRIMARY CARE CLINIC | Age: 41
End: 2021-05-03

## 2021-05-03 NOTE — TELEPHONE ENCOUNTER
----- Message from Seth Landeros sent at 4/30/2021 11:45 AM EDT -----  Subject: Message to Provider    QUESTIONS  Information for Provider? Sp[employer]er [employer] from [facility] needs to know if   this person is still a patient and the last time that she was seen. Please   call her back  ---------------------------------------------------------------------------  --------------  CALL BACK INFO  What is the best way for the office to contact you? OK to leave message on   voicemail  Preferred Call Back Phone Number? [phone]  ---------------------------------------------------------------------------  --------------  SCRIPT ANSWERS  Relationship to Patient? Third Party  Representative Name?  Spenser Memory from [employer]

## 2021-05-04 DIAGNOSIS — I10 ESSENTIAL HYPERTENSION: ICD-10-CM

## 2021-05-04 RX ORDER — LOSARTAN POTASSIUM 100 MG/1
TABLET ORAL
Qty: 90 TABLET | Refills: 2 | Status: SHIPPED | OUTPATIENT
Start: 2021-05-04

## 2021-05-04 NOTE — TELEPHONE ENCOUNTER
Medication:   Requested Prescriptions     Pending Prescriptions Disp Refills    losartan (COZAAR) 100 MG tablet [Pharmacy Med Name: LOSARTAN POTASSIUM 100 MG TAB] 90 tablet 2     Sig: TAKE ONE TABLET BY MOUTH DAILY        Last Filled:      Patient Phone Number: [phone] (home)     Last appt: 2/10/2021   Next appt: Visit date not found    Last OARRS: No flowsheet data found.

## 2023-09-14 ENCOUNTER — PATIENT MESSAGE (OUTPATIENT)
Dept: PRIMARY CARE CLINIC | Age: 43
End: 2023-09-14

## 2023-09-14 ENCOUNTER — OFFICE VISIT (OUTPATIENT)
Dept: PRIMARY CARE CLINIC | Age: 43
End: 2023-09-14
Payer: COMMERCIAL

## 2023-09-14 VITALS
WEIGHT: 188.6 LBS | TEMPERATURE: 97.9 F | SYSTOLIC BLOOD PRESSURE: 220 MMHG | HEIGHT: 62 IN | BODY MASS INDEX: 34.71 KG/M2 | OXYGEN SATURATION: 98 % | HEART RATE: 93 BPM | DIASTOLIC BLOOD PRESSURE: 130 MMHG

## 2023-09-14 DIAGNOSIS — Z12.31 ENCOUNTER FOR SCREENING MAMMOGRAM FOR HIGH-RISK PATIENT: ICD-10-CM

## 2023-09-14 DIAGNOSIS — E11.9 NEWLY DIAGNOSED DIABETES (HCC): ICD-10-CM

## 2023-09-14 DIAGNOSIS — E78.2 MIXED HYPERLIPIDEMIA: ICD-10-CM

## 2023-09-14 DIAGNOSIS — Z12.4 SCREENING FOR CERVICAL CANCER: ICD-10-CM

## 2023-09-14 DIAGNOSIS — I10 ESSENTIAL HYPERTENSION: ICD-10-CM

## 2023-09-14 DIAGNOSIS — Z23 NEED FOR PNEUMOCOCCAL VACCINATION: ICD-10-CM

## 2023-09-14 DIAGNOSIS — Z00.00 PHYSICAL EXAM: Primary | ICD-10-CM

## 2023-09-14 DIAGNOSIS — Z23 NEED FOR COVID-19 VACCINE: ICD-10-CM

## 2023-09-14 DIAGNOSIS — Z23 FLU VACCINE NEED: ICD-10-CM

## 2023-09-14 DIAGNOSIS — Z00.00 PHYSICAL EXAM: ICD-10-CM

## 2023-09-14 LAB
25(OH)D3 SERPL-MCNC: 18.2 NG/ML
ALBUMIN SERPL-MCNC: 3.9 G/DL (ref 3.4–5)
ALBUMIN/GLOB SERPL: 1.1 {RATIO} (ref 1.1–2.2)
ALP SERPL-CCNC: 98 U/L (ref 40–129)
ALT SERPL-CCNC: 12 U/L (ref 10–40)
ANION GAP SERPL CALCULATED.3IONS-SCNC: 11 MMOL/L (ref 3–16)
AST SERPL-CCNC: 14 U/L (ref 15–37)
BACTERIA URNS QL MICRO: ABNORMAL /HPF
BASOPHILS # BLD: 0.1 K/UL (ref 0–0.2)
BASOPHILS NFR BLD: 0.7 %
BILIRUB SERPL-MCNC: <0.2 MG/DL (ref 0–1)
BILIRUB UR QL STRIP.AUTO: NEGATIVE
BUN SERPL-MCNC: 32 MG/DL (ref 7–20)
CALCIUM SERPL-MCNC: 10.2 MG/DL (ref 8.3–10.6)
CHARACTER UR: ABNORMAL
CHLORIDE SERPL-SCNC: 101 MMOL/L (ref 99–110)
CHOLEST SERPL-MCNC: 369 MG/DL (ref 0–199)
CLARITY UR: ABNORMAL
CO2 SERPL-SCNC: 26 MMOL/L (ref 21–32)
COLOR UR: YELLOW
CREAT SERPL-MCNC: 2.3 MG/DL (ref 0.6–1.1)
CRYSTALS URNS MICRO: ABNORMAL /HPF
DEPRECATED RDW RBC AUTO: 18.1 % (ref 12.4–15.4)
EOSINOPHIL # BLD: 0.1 K/UL (ref 0–0.6)
EOSINOPHIL NFR BLD: 1 %
EPI CELLS #/AREA URNS AUTO: 4 /HPF (ref 0–5)
GFR SERPLBLD CREATININE-BSD FMLA CKD-EPI: 27 ML/MIN/{1.73_M2}
GLUCOSE SERPL-MCNC: 135 MG/DL (ref 70–99)
GLUCOSE UR STRIP.AUTO-MCNC: NEGATIVE MG/DL
HCG UR QL: NEGATIVE
HCT VFR BLD AUTO: 40.7 % (ref 36–48)
HCV AB SERPL QL IA: NORMAL
HDLC SERPL-MCNC: 72 MG/DL (ref 40–60)
HGB BLD-MCNC: 13.1 G/DL (ref 12–16)
HGB UR QL STRIP.AUTO: NEGATIVE
KETONES UR STRIP.AUTO-MCNC: NEGATIVE MG/DL
LDLC SERPL CALC-MCNC: 255 MG/DL
LEUKOCYTE ESTERASE UR QL STRIP.AUTO: ABNORMAL
LYMPHOCYTES # BLD: 2.3 K/UL (ref 1–5.1)
LYMPHOCYTES NFR BLD: 20 %
MCH RBC QN AUTO: 24.3 PG (ref 26–34)
MCHC RBC AUTO-ENTMCNC: 32.1 G/DL (ref 31–36)
MCV RBC AUTO: 75.9 FL (ref 80–100)
MONOCYTES # BLD: 0.7 K/UL (ref 0–1.3)
MONOCYTES NFR BLD: 5.9 %
NEUTROPHILS # BLD: 8.2 K/UL (ref 1.7–7.7)
NEUTROPHILS NFR BLD: 72.4 %
NITRITE UR QL STRIP.AUTO: NEGATIVE
PH UR STRIP.AUTO: 7.5 [PH] (ref 5–8)
PLATELET # BLD AUTO: 342 K/UL (ref 135–450)
PMV BLD AUTO: 10.3 FL (ref 5–10.5)
POTASSIUM SERPL-SCNC: 4.4 MMOL/L (ref 3.5–5.1)
PROT SERPL-MCNC: 7.5 G/DL (ref 6.4–8.2)
PROT UR STRIP.AUTO-MCNC: >=1000 MG/DL
RBC # BLD AUTO: 5.36 M/UL (ref 4–5.2)
RBC CLUMPS #/AREA URNS AUTO: 2 /HPF (ref 0–4)
SODIUM SERPL-SCNC: 138 MMOL/L (ref 136–145)
SP GR UR STRIP.AUTO: 1.02 (ref 1–1.03)
TRIGL SERPL-MCNC: 208 MG/DL (ref 0–150)
TSH SERPL DL<=0.005 MIU/L-ACNC: 1.7 UIU/ML (ref 0.27–4.2)
UA DIPSTICK W REFLEX MICRO PNL UR: YES
URN SPEC COLLECT METH UR: ABNORMAL
UROBILINOGEN UR STRIP-ACNC: 0.2 E.U./DL
VLDLC SERPL CALC-MCNC: 42 MG/DL
WBC # BLD AUTO: 11.3 K/UL (ref 4–11)
WBC #/AREA URNS AUTO: 34 /HPF (ref 0–5)

## 2023-09-14 PROCEDURE — 3080F DIAST BP >= 90 MM HG: CPT | Performed by: INTERNAL MEDICINE

## 2023-09-14 PROCEDURE — 99204 OFFICE O/P NEW MOD 45 MIN: CPT | Performed by: INTERNAL MEDICINE

## 2023-09-14 PROCEDURE — 3077F SYST BP >= 140 MM HG: CPT | Performed by: INTERNAL MEDICINE

## 2023-09-14 RX ORDER — ATORVASTATIN CALCIUM 40 MG/1
40 TABLET, FILM COATED ORAL DAILY
Qty: 90 TABLET | Refills: 5 | Status: SHIPPED | OUTPATIENT
Start: 2023-09-14

## 2023-09-14 RX ORDER — LABETALOL 200 MG/1
200 TABLET, FILM COATED ORAL 2 TIMES DAILY
Qty: 60 TABLET | Refills: 3 | Status: SHIPPED | OUTPATIENT
Start: 2023-09-14

## 2023-09-14 RX ORDER — HYDROCHLOROTHIAZIDE 25 MG/1
25 TABLET ORAL DAILY
COMMUNITY
End: 2023-09-14

## 2023-09-14 RX ORDER — HYDROCHLOROTHIAZIDE 25 MG/1
25 TABLET ORAL EVERY MORNING
Qty: 30 TABLET | Refills: 5 | Status: SHIPPED | OUTPATIENT
Start: 2023-09-14

## 2023-09-14 SDOH — ECONOMIC STABILITY: FOOD INSECURITY: WITHIN THE PAST 12 MONTHS, THE FOOD YOU BOUGHT JUST DIDN'T LAST AND YOU DIDN'T HAVE MONEY TO GET MORE.: NEVER TRUE

## 2023-09-14 SDOH — ECONOMIC STABILITY: INCOME INSECURITY: HOW HARD IS IT FOR YOU TO PAY FOR THE VERY BASICS LIKE FOOD, HOUSING, MEDICAL CARE, AND HEATING?: NOT HARD AT ALL

## 2023-09-14 SDOH — ECONOMIC STABILITY: HOUSING INSECURITY
IN THE LAST 12 MONTHS, WAS THERE A TIME WHEN YOU DID NOT HAVE A STEADY PLACE TO SLEEP OR SLEPT IN A SHELTER (INCLUDING NOW)?: NO

## 2023-09-14 SDOH — ECONOMIC STABILITY: FOOD INSECURITY: WITHIN THE PAST 12 MONTHS, YOU WORRIED THAT YOUR FOOD WOULD RUN OUT BEFORE YOU GOT MONEY TO BUY MORE.: NEVER TRUE

## 2023-09-14 ASSESSMENT — PATIENT HEALTH QUESTIONNAIRE - PHQ9
SUM OF ALL RESPONSES TO PHQ QUESTIONS 1-9: 0
2. FEELING DOWN, DEPRESSED OR HOPELESS: 0
SUM OF ALL RESPONSES TO PHQ QUESTIONS 1-9: 0
1. LITTLE INTEREST OR PLEASURE IN DOING THINGS: 0
SUM OF ALL RESPONSES TO PHQ9 QUESTIONS 1 & 2: 0
SUM OF ALL RESPONSES TO PHQ QUESTIONS 1-9: 0
SUM OF ALL RESPONSES TO PHQ QUESTIONS 1-9: 0

## 2023-09-14 ASSESSMENT — ENCOUNTER SYMPTOMS
ABDOMINAL DISTENTION: 0
WHEEZING: 0
CHEST TIGHTNESS: 0
SHORTNESS OF BREATH: 0
DIARRHEA: 0
ABDOMINAL PAIN: 0
GASTROINTESTINAL NEGATIVE: 1
EYES NEGATIVE: 1
CONSTIPATION: 0
COUGH: 0
BLOOD IN STOOL: 0

## 2023-09-14 NOTE — PROGRESS NOTES
Comprehensive Metabolic Panel; Future  -     Urinalysis; Future  -     Vitamin D 25 Hydroxy; Future  -     labetalol (NORMODYNE) 200 MG tablet; Take 1 tablet by mouth 2 times daily  -     hydroCHLOROthiazide (HYDRODIURIL) 25 MG tablet; Take 1 tablet by mouth every morning    Newly diagnosed diabetes ([address])  restart Lipitor  -     Comprehensive Metabolic Panel; Future  -     Lipid Panel; Future  -     Urinalysis; Future  -     Amb External Referral To Ophthalmology  -     atorvastatin (LIPITOR) 40 MG tablet; Take 1 tablet by mouth daily    Mixed hyperlipidemia  was very high . Restart med   -     Hemoglobin A1C; Future  -     Lipid Panel; Future  -     TSH; Future  -     atorvastatin (LIPITOR) 40 MG tablet; Take 1 tablet by mouth daily    Screening for cervical cancer  -     Na Solis MD, Gynecology, [facility]    Encounter for screening mammogram for high-risk patient  Sister with Breast cancer   -     [facility] DIGITAL SCREEN W OR WO CAD BILATERAL; Future        Lucas Perez MD               Mixed hyperlipidemia  LDL > 240! C      Elevated liver enzymes  Check Hp C ab ; Hep B ab   -    H/O vitamin D deficiency  -          Weight disorder  BMI > 40 . Has lost > 25 lbs .  Continue low carb diet + exercise reg             Plan:              Lucas Perez MD

## 2023-09-15 LAB
EST. AVERAGE GLUCOSE BLD GHB EST-MCNC: 134.1 MG/DL
HBA1C MFR BLD: 6.3 %
HIV 1+2 AB+HIV1 P24 AG SERPL QL IA: NORMAL
HIV 2 AB SERPL QL IA: NORMAL
HIV1 AB SERPL QL IA: NORMAL
HIV1 P24 AG SERPL QL IA: NORMAL

## 2023-09-15 NOTE — TELEPHONE ENCOUNTER
From: Maura Rasheed  To: Dr. Rosalia Rico: 9/14/2023 11:48 AM EDT  Subject: Atorvastatin    You told me to stop taking the atorvastatin, but when I went to  my other medicines they gave me more. Do you want me to continue that or not?

## 2023-09-21 ENCOUNTER — OFFICE VISIT (OUTPATIENT)
Dept: PRIMARY CARE CLINIC | Age: 43
End: 2023-09-21
Payer: COMMERCIAL

## 2023-09-21 VITALS
HEIGHT: 62 IN | SYSTOLIC BLOOD PRESSURE: 180 MMHG | OXYGEN SATURATION: 98 % | BODY MASS INDEX: 34.56 KG/M2 | DIASTOLIC BLOOD PRESSURE: 110 MMHG | TEMPERATURE: 98.4 F | WEIGHT: 187.8 LBS | HEART RATE: 81 BPM

## 2023-09-21 DIAGNOSIS — E55.9 VITAMIN D DEFICIENCY: ICD-10-CM

## 2023-09-21 DIAGNOSIS — Z23 NEED FOR COVID-19 VACCINE: ICD-10-CM

## 2023-09-21 DIAGNOSIS — E78.2 MIXED HYPERLIPIDEMIA: ICD-10-CM

## 2023-09-21 DIAGNOSIS — Z23 FLU VACCINE NEED: ICD-10-CM

## 2023-09-21 DIAGNOSIS — Z23 NEED FOR PNEUMOCOCCAL VACCINATION: ICD-10-CM

## 2023-09-21 DIAGNOSIS — N28.9 RENAL INSUFFICIENCY: ICD-10-CM

## 2023-09-21 DIAGNOSIS — I10 ESSENTIAL HYPERTENSION: ICD-10-CM

## 2023-09-21 PROCEDURE — 90674 CCIIV4 VAC NO PRSV 0.5 ML IM: CPT | Performed by: INTERNAL MEDICINE

## 2023-09-21 PROCEDURE — 99214 OFFICE O/P EST MOD 30 MIN: CPT | Performed by: INTERNAL MEDICINE

## 2023-09-21 PROCEDURE — 3080F DIAST BP >= 90 MM HG: CPT | Performed by: INTERNAL MEDICINE

## 2023-09-21 PROCEDURE — 3077F SYST BP >= 140 MM HG: CPT | Performed by: INTERNAL MEDICINE

## 2023-09-21 PROCEDURE — G0008 ADMIN INFLUENZA VIRUS VAC: HCPCS | Performed by: INTERNAL MEDICINE

## 2023-09-21 RX ORDER — ERGOCALCIFEROL 1.25 MG/1
50000 CAPSULE ORAL WEEKLY
Qty: 12 CAPSULE | Refills: 1 | Status: SHIPPED | OUTPATIENT
Start: 2023-09-21

## 2023-09-21 RX ORDER — ATORVASTATIN CALCIUM 40 MG/1
40 TABLET, FILM COATED ORAL DAILY
Qty: 90 TABLET | Refills: 5 | Status: SHIPPED | OUTPATIENT
Start: 2023-09-21

## 2023-09-21 RX ORDER — NIFEDIPINE 60 MG/1
60 TABLET, EXTENDED RELEASE ORAL DAILY
Qty: 30 TABLET | Refills: 5 | Status: SHIPPED | OUTPATIENT
Start: 2023-09-21

## 2023-09-21 RX ORDER — HYDROCHLOROTHIAZIDE 25 MG/1
25 TABLET ORAL EVERY MORNING
Qty: 30 TABLET | Refills: 5 | Status: SHIPPED | OUTPATIENT
Start: 2023-09-21

## 2023-09-21 RX ORDER — LABETALOL 200 MG/1
200 TABLET, FILM COATED ORAL 2 TIMES DAILY
Qty: 60 TABLET | Refills: 3 | Status: SHIPPED | OUTPATIENT
Start: 2023-09-21

## 2023-09-21 ASSESSMENT — ENCOUNTER SYMPTOMS
BLOOD IN STOOL: 0
CHEST TIGHTNESS: 0
EYES NEGATIVE: 1
DIARRHEA: 0
ABDOMINAL PAIN: 0
SHORTNESS OF BREATH: 0
COUGH: 0
WHEEZING: 0
GASTROINTESTINAL NEGATIVE: 1
CONSTIPATION: 0
ABDOMINAL DISTENTION: 0

## 2023-09-21 NOTE — PROGRESS NOTES
Subjective:      Patient ID: Tyrel Young is a 38 y.o. female. 9/14/2023 Patient presents with: Annual Exam  Hypertension  has not been taking meds >>    Sister diagnosed with Breast Cancer                   VV   2/10/2021 Patient presents with:  Hypertension: BP readings good at home   Diabetes: No issues with Metformin   Hyperlipidemia: Taking Lipitor reg                  3/4/2020 Patient presents with:  1 Month Follow-Up    Taking Hctz with Norvasc . No issues      Got labs ordered last visit done today ! Last seen  1/15/2020 Patient presents with:  Hypertension  Check-Up            3/29/19 Patient presents with:  Hypertension: tolerating hyzaar fine             Last seen  2/12/19 Patient presents with:  Pre-op Exam: Pre-op exam surgery scheduled 2/15/19 left ear lesion Dr Harlan Ty    Never had any surgery before     No FH of anaesthesia problems   No personal or FH of bleeding diorders          9/21/18 Patient presents with:  Established New Doctor        Hypertension  Pertinent negatives include no chest pain, headaches or shortness of breath. Review of Systems   Constitutional:  Negative for activity change, appetite change, fatigue, fever and unexpected weight change. Flu vac  1/20    Tdap  9/18    covid vac   HENT: Negative. Dental care reg    Eyes: Negative. Negative for visual disturbance. Eye exam >>. Respiratory:  Negative for cough, chest tightness, shortness of breath and wheezing. Does not smoke . No Etoh   No rec drugs     No Asthma    Cardiovascular: Negative. Negative for chest pain and leg swelling. HTN  > 5 yrs ,     FH of HTN ; No FH of CAD    Gastrointestinal: Negative. Negative for abdominal distention, abdominal pain, blood in stool, constipation and diarrhea. No FH of Ca colon    Endocrine:        Dad with Diabetes    Genitourinary:  Negative for dysuria, frequency, menstrual problem, urgency and vaginal discharge.

## 2023-09-24 ENCOUNTER — PATIENT MESSAGE (OUTPATIENT)
Dept: PRIMARY CARE CLINIC | Age: 43
End: 2023-09-24

## 2023-10-02 ENCOUNTER — PATIENT MESSAGE (OUTPATIENT)
Dept: PRIMARY CARE CLINIC | Age: 43
End: 2023-10-02

## 2023-11-30 ENCOUNTER — OFFICE VISIT (OUTPATIENT)
Dept: PRIMARY CARE CLINIC | Age: 43
End: 2023-11-30
Payer: COMMERCIAL

## 2023-11-30 VITALS
WEIGHT: 187.4 LBS | HEART RATE: 80 BPM | HEIGHT: 62 IN | DIASTOLIC BLOOD PRESSURE: 70 MMHG | TEMPERATURE: 98.1 F | BODY MASS INDEX: 34.48 KG/M2 | OXYGEN SATURATION: 97 % | SYSTOLIC BLOOD PRESSURE: 110 MMHG

## 2023-11-30 DIAGNOSIS — I10 ESSENTIAL HYPERTENSION: ICD-10-CM

## 2023-11-30 DIAGNOSIS — E78.2 MIXED HYPERLIPIDEMIA: ICD-10-CM

## 2023-11-30 DIAGNOSIS — R73.03 PREDIABETES: Primary | ICD-10-CM

## 2023-11-30 DIAGNOSIS — J06.9 UPPER RESPIRATORY TRACT INFECTION, UNSPECIFIED TYPE: ICD-10-CM

## 2023-11-30 PROCEDURE — 99214 OFFICE O/P EST MOD 30 MIN: CPT | Performed by: INTERNAL MEDICINE

## 2023-11-30 PROCEDURE — 3074F SYST BP LT 130 MM HG: CPT | Performed by: INTERNAL MEDICINE

## 2023-11-30 PROCEDURE — 3078F DIAST BP <80 MM HG: CPT | Performed by: INTERNAL MEDICINE

## 2023-11-30 RX ORDER — HYDROCHLOROTHIAZIDE 25 MG/1
25 TABLET ORAL EVERY MORNING
Qty: 30 TABLET | Refills: 5 | Status: SHIPPED | OUTPATIENT
Start: 2023-11-30

## 2023-11-30 RX ORDER — AZITHROMYCIN 250 MG/1
250 TABLET, FILM COATED ORAL SEE ADMIN INSTRUCTIONS
Qty: 6 TABLET | Refills: 0 | Status: SHIPPED | OUTPATIENT
Start: 2023-11-30 | End: 2023-12-05

## 2023-11-30 RX ORDER — NIFEDIPINE 60 MG/1
60 TABLET, EXTENDED RELEASE ORAL DAILY
Qty: 30 TABLET | Refills: 5 | Status: SHIPPED | OUTPATIENT
Start: 2023-11-30

## 2023-11-30 RX ORDER — GUAIFENESIN 600 MG/1
600 TABLET, EXTENDED RELEASE ORAL 2 TIMES DAILY
Qty: 12 TABLET | Refills: 0 | Status: SHIPPED | OUTPATIENT
Start: 2023-11-30 | End: 2023-12-06

## 2023-11-30 RX ORDER — LABETALOL 200 MG/1
200 TABLET, FILM COATED ORAL 2 TIMES DAILY
Qty: 60 TABLET | Refills: 3 | Status: SHIPPED | OUTPATIENT
Start: 2023-11-30

## 2023-11-30 ASSESSMENT — ENCOUNTER SYMPTOMS
CONSTIPATION: 0
ABDOMINAL DISTENTION: 0
SHORTNESS OF BREATH: 0
BLOOD IN STOOL: 0
CHEST TIGHTNESS: 0
SORE THROAT: 1
WHEEZING: 0
GASTROINTESTINAL NEGATIVE: 1
EYES NEGATIVE: 1
DIARRHEA: 0
ABDOMINAL PAIN: 0
COUGH: 1

## 2023-11-30 NOTE — PROGRESS NOTES
Subjective:      Patient ID: Paulette Velez is a 41 y.o. female. 11/30/2023 Patient presents with:  Hypertension  URI                  9/21/23 9/14/2023 Patient presents with: Annual Exam  Hypertension  has not been taking meds >>    Sister diagnosed with Breast Cancer                   VV   2/10/2021 Patient presents with:  Hypertension: BP readings good at home   Diabetes: No issues with Metformin   Hyperlipidemia: Taking Lipitor reg                  3/4/2020 Patient presents with:  1 Month Follow-Up    Taking Hctz with Norvasc . No issues      Got labs ordered last visit done today ! Last seen  1/15/2020 Patient presents with:  Hypertension  Check-Up            3/29/19 Patient presents with:  Hypertension: tolerating hyzaar fine             Last seen  2/12/19 Patient presents with:  Pre-op Exam: Pre-op exam surgery scheduled 2/15/19 left ear lesion Dr Keena Salazar    Never had any surgery before     No FH of anaesthesia problems   No personal or FH of bleeding diorders          9/21/18 Patient presents with:  Established New Doctor        Hypertension  Pertinent negatives include no chest pain, headaches or shortness of breath. URI   Associated symptoms include congestion, coughing and a sore throat. Pertinent negatives include no abdominal pain, chest pain, diarrhea, dysuria, headaches or wheezing. Review of Systems   Constitutional:  Negative for activity change, appetite change, fatigue, fever and unexpected weight change. Flu vac  9/23    Tdap  9/18    covid vac   HENT:  Positive for congestion and sore throat. Dental care reg    Eyes: Negative. Negative for visual disturbance. Eye exam >>. Respiratory:  Positive for cough. Negative for chest tightness, shortness of breath and wheezing. Does not smoke . No Etoh   No rec drugs     No Asthma    Cardiovascular: Negative. Negative for chest pain and leg swelling.         HTN  > 5 yrs ,     FH of HTN ; No FH of

## 2023-12-07 DIAGNOSIS — J06.9 UPPER RESPIRATORY TRACT INFECTION, UNSPECIFIED TYPE: ICD-10-CM

## 2023-12-07 RX ORDER — AZITHROMYCIN 250 MG/1
TABLET, FILM COATED ORAL
Qty: 6 TABLET | Refills: 0 | OUTPATIENT
Start: 2023-12-07

## 2024-01-11 DIAGNOSIS — E78.2 MIXED HYPERLIPIDEMIA: ICD-10-CM

## 2024-01-11 DIAGNOSIS — R73.03 PREDIABETES: ICD-10-CM

## 2024-01-11 LAB
ALBUMIN SERPL-MCNC: 4.2 G/DL (ref 3.4–5)
ALBUMIN/GLOB SERPL: 1.4 {RATIO} (ref 1.1–2.2)
ALP SERPL-CCNC: 94 U/L (ref 40–129)
ALT SERPL-CCNC: 19 U/L (ref 10–40)
ANION GAP SERPL CALCULATED.3IONS-SCNC: 13 MMOL/L (ref 3–16)
AST SERPL-CCNC: 19 U/L (ref 15–37)
BILIRUB SERPL-MCNC: 0.3 MG/DL (ref 0–1)
BUN SERPL-MCNC: 36 MG/DL (ref 7–20)
CALCIUM SERPL-MCNC: 9.1 MG/DL (ref 8.3–10.6)
CHLORIDE SERPL-SCNC: 92 MMOL/L (ref 99–110)
CHOLEST SERPL-MCNC: 177 MG/DL (ref 0–199)
CO2 SERPL-SCNC: 28 MMOL/L (ref 21–32)
CREAT SERPL-MCNC: 2.6 MG/DL (ref 0.6–1.1)
GFR SERPLBLD CREATININE-BSD FMLA CKD-EPI: 23 ML/MIN/{1.73_M2}
GLUCOSE SERPL-MCNC: 160 MG/DL (ref 70–99)
HDLC SERPL-MCNC: 64 MG/DL (ref 40–60)
LDLC SERPL CALC-MCNC: 78 MG/DL
POTASSIUM SERPL-SCNC: 3.4 MMOL/L (ref 3.5–5.1)
PROT SERPL-MCNC: 7.2 G/DL (ref 6.4–8.2)
SODIUM SERPL-SCNC: 133 MMOL/L (ref 136–145)
TRIGL SERPL-MCNC: 174 MG/DL (ref 0–150)
VLDLC SERPL CALC-MCNC: 35 MG/DL

## 2024-01-12 LAB
EST. AVERAGE GLUCOSE BLD GHB EST-MCNC: 137 MG/DL
HBA1C MFR BLD: 6.4 %

## 2024-02-01 ENCOUNTER — HOSPITAL ENCOUNTER (OUTPATIENT)
Dept: WOMENS IMAGING | Age: 44
Discharge: HOME | End: 2024-02-01
Attending: INTERNAL MEDICINE
Payer: COMMERCIAL

## 2024-02-01 DIAGNOSIS — Z12.31 ENCOUNTER FOR SCREENING MAMMOGRAM FOR HIGH-RISK PATIENT: ICD-10-CM

## 2024-02-01 PROCEDURE — 77063 BREAST TOMOSYNTHESIS BI: CPT

## 2024-04-16 ENCOUNTER — CASE MANAGEMENT (OUTPATIENT)
Dept: WOMENS IMAGING | Age: 44
End: 2024-04-16

## 2024-04-16 NOTE — PROGRESS NOTES
Late entry in Progress Notes:  2/6 - RN spoke to pt concerning abnormal screening mammogram. Pt states that her sister will call back to make appt.  3/29 -RN called and spoke to pt again. Pt states she will talk to her sister and call RN on 4/1. RN has not received a return call.

## 2024-05-06 DIAGNOSIS — I10 ESSENTIAL HYPERTENSION: ICD-10-CM

## 2024-05-06 RX ORDER — LABETALOL 200 MG/1
200 TABLET, FILM COATED ORAL 2 TIMES DAILY
Qty: 60 TABLET | Refills: 3 | Status: SHIPPED | OUTPATIENT
Start: 2024-05-06

## 2024-05-06 NOTE — TELEPHONE ENCOUNTER
Medication:   Requested Prescriptions     Pending Prescriptions Disp Refills    labetalol (NORMODYNE) 200 MG tablet [Pharmacy Med Name: LABETALOL  MG TABLET] 60 tablet 3     Sig: TAKE 1 TABLET BY MOUTH TWICE A DAY        Last Filled:      Patient Phone Number: [phone] (home)     Last appt: 11/30/2023   Next appt: Visit date not found    Last OARRS:        No data to display

## 2024-09-07 DIAGNOSIS — I10 ESSENTIAL HYPERTENSION: ICD-10-CM

## 2024-09-08 DIAGNOSIS — I10 ESSENTIAL HYPERTENSION: ICD-10-CM

## 2024-09-09 RX ORDER — LABETALOL 200 MG/1
200 TABLET, FILM COATED ORAL 2 TIMES DAILY
Qty: 60 TABLET | Refills: 3 | Status: SHIPPED | OUTPATIENT
Start: 2024-09-09

## 2024-09-09 RX ORDER — HYDROCHLOROTHIAZIDE 25 MG/1
25 TABLET ORAL EVERY MORNING
Qty: 90 TABLET | Refills: 4 | Status: SHIPPED | OUTPATIENT
Start: 2024-09-09

## 2024-09-13 DIAGNOSIS — I10 ESSENTIAL HYPERTENSION: ICD-10-CM

## 2024-09-13 RX ORDER — NIFEDIPINE 60 MG/1
60 TABLET, EXTENDED RELEASE ORAL DAILY
Qty: 30 TABLET | Refills: 5 | OUTPATIENT
Start: 2024-09-13

## 2024-10-14 DIAGNOSIS — I10 ESSENTIAL HYPERTENSION: ICD-10-CM

## 2024-10-14 NOTE — TELEPHONE ENCOUNTER
Medication:   Requested Prescriptions     Pending Prescriptions Disp Refills    NIFEdipine (PROCARDIA XL) 60 MG extended release tablet [Pharmacy Med Name: NIFEDIPINE ER 60 MG TABLET, 24HR] 30 tablet 5     Sig: TAKE 1 TABLET BY MOUTH DAILY        Last Filled:      Patient Phone Number: [phone] (home)     Last appt: 11/30/2023   Next appt: Visit date not found    Last OARRS:        No data to display

## 2024-10-15 RX ORDER — NIFEDIPINE 60 MG/1
60 TABLET, EXTENDED RELEASE ORAL DAILY
Qty: 30 TABLET | Refills: 5 | Status: SHIPPED | OUTPATIENT
Start: 2024-10-15

## 2024-12-05 DIAGNOSIS — E78.2 MIXED HYPERLIPIDEMIA: ICD-10-CM

## 2024-12-05 DIAGNOSIS — I10 ESSENTIAL HYPERTENSION: ICD-10-CM

## 2024-12-05 RX ORDER — LABETALOL 200 MG/1
200 TABLET, FILM COATED ORAL 2 TIMES DAILY
Qty: 180 TABLET | Refills: 0 | Status: SHIPPED | OUTPATIENT
Start: 2024-12-05

## 2024-12-05 RX ORDER — ATORVASTATIN CALCIUM 40 MG/1
40 TABLET, FILM COATED ORAL DAILY
Qty: 90 TABLET | Refills: 5 | Status: SHIPPED | OUTPATIENT
Start: 2024-12-05

## 2024-12-05 NOTE — TELEPHONE ENCOUNTER
Medication:   Requested Prescriptions     Pending Prescriptions Disp Refills    atorvastatin (LIPITOR) 40 MG tablet [Pharmacy Med Name: ATORVASTATIN 40 MG TABLET] 90 tablet 5     Sig: TAKE 1 TABLET BY MOUTH DAILY        Last Filled:      Patient Phone Number: [phone] (home)     Last appt: 11/30/2023   Next appt: Visit date not found    Last OARRS:        No data to display

## 2024-12-05 NOTE — TELEPHONE ENCOUNTER
Medication:   Requested Prescriptions     Pending Prescriptions Disp Refills    labetalol (NORMODYNE) 200 MG tablet [Pharmacy Med Name: LABETALOL  MG TABLET] 180 tablet      Sig: TAKE 1 TABLET BY MOUTH 2 TIMES A DAY        Last Filled:      Patient Phone Number: [phone] (home)     Last appt: 11/30/2023   Next appt: Visit date not found    Last OARRS:        No data to display

## 2025-02-05 ENCOUNTER — CASE MANAGEMENT (OUTPATIENT)
Dept: WOMENS IMAGING | Age: 45
End: 2025-02-05

## 2025-02-05 NOTE — PROGRESS NOTES
Discussed recommended breast imaging with pt. Would now be a bilateral diagnostic mammogram since it has been a year. She is going to discuss with her sister and call back to schedule. She has NN contact info.

## 2025-02-05 NOTE — PROGRESS NOTES
LMTC regarding need for further breast imaging. Offered to assist with scheduling appropriate breast imaging.

## 2025-02-11 DIAGNOSIS — Z80.3 FAMILY HISTORY OF BREAST CANCER: Primary | ICD-10-CM

## 2025-02-20 ENCOUNTER — HOSPITAL ENCOUNTER (OUTPATIENT)
Dept: ULTRASOUND IMAGING | Age: 45
Discharge: HOME | End: 2025-02-20
Payer: MEDICAID

## 2025-02-20 ENCOUNTER — HOSPITAL ENCOUNTER (OUTPATIENT)
Dept: WOMENS IMAGING | Age: 45
Discharge: HOME | End: 2025-02-20
Payer: MEDICAID

## 2025-02-20 VITALS — WEIGHT: 187 LBS | HEIGHT: 62 IN | BODY MASS INDEX: 34.41 KG/M2

## 2025-02-20 DIAGNOSIS — R92.8 ABNORMAL MAMMOGRAM: ICD-10-CM

## 2025-02-20 DIAGNOSIS — Z80.3 FAMILY HISTORY OF BREAST CANCER: ICD-10-CM

## 2025-02-20 PROCEDURE — G0279 TOMOSYNTHESIS, MAMMO: HCPCS

## 2025-02-20 PROCEDURE — 76642 ULTRASOUND BREAST LIMITED: CPT

## 2025-03-04 DIAGNOSIS — I10 ESSENTIAL HYPERTENSION: ICD-10-CM

## 2025-03-04 RX ORDER — LABETALOL 200 MG/1
200 TABLET, FILM COATED ORAL 2 TIMES DAILY
Qty: 180 TABLET | Refills: 0 | Status: SHIPPED | OUTPATIENT
Start: 2025-03-04

## 2025-03-04 NOTE — TELEPHONE ENCOUNTER
Medication:   Requested Prescriptions     Pending Prescriptions Disp Refills    labetalol (NORMODYNE) 200 MG tablet [Pharmacy Med Name: LABETALOL  MG TABLET] 180 tablet 0     Sig: TAKE 1 TABLET BY MOUTH 2 TIMES A DAY        Last Filled:      Patient Phone Number: [phone] (home)     Last appt: 11/30/2023   Next appt: Visit date not found    Last OARRS:        No data to display

## 2025-04-15 DIAGNOSIS — I10 ESSENTIAL HYPERTENSION: ICD-10-CM

## 2025-04-15 RX ORDER — NIFEDIPINE 60 MG/1
60 TABLET, EXTENDED RELEASE ORAL DAILY
Qty: 90 TABLET | OUTPATIENT
Start: 2025-04-15

## 2025-06-02 DIAGNOSIS — I10 ESSENTIAL HYPERTENSION: ICD-10-CM

## 2025-06-02 NOTE — TELEPHONE ENCOUNTER
Medication:   Requested Prescriptions     Pending Prescriptions Disp Refills    labetalol (NORMODYNE) 200 MG tablet [Pharmacy Med Name: LABETALOL  MG TABLET] 180 tablet 0     Sig: TAKE 1 TABLET BY MOUTH 2 TIMES A DAY        Last Filled:      Patient Phone Number: [phone] (home)     Last appt: 11/30/2023   Next appt: Visit date not found    Last OARRS:        No data to display

## 2025-06-03 RX ORDER — LABETALOL 200 MG/1
200 TABLET, FILM COATED ORAL 2 TIMES DAILY
Qty: 180 TABLET | Refills: 0 | Status: SHIPPED | OUTPATIENT
Start: 2025-06-03

## 2025-06-13 ENCOUNTER — APPOINTMENT (OUTPATIENT)
Dept: CT IMAGING | Age: 45
DRG: 673 | End: 2025-06-13
Payer: MEDICARE

## 2025-06-13 ENCOUNTER — OFFICE VISIT (OUTPATIENT)
Dept: PRIMARY CARE CLINIC | Age: 45
End: 2025-06-13

## 2025-06-13 ENCOUNTER — APPOINTMENT (OUTPATIENT)
Dept: CT IMAGING | Age: 45
DRG: 673 | End: 2025-06-13
Attending: INTERNAL MEDICINE
Payer: MEDICARE

## 2025-06-13 ENCOUNTER — HOSPITAL ENCOUNTER (INPATIENT)
Age: 45
LOS: 8 days | Discharge: HOME | DRG: 673 | End: 2025-06-21
Attending: EMERGENCY MEDICINE | Admitting: HOSPITALIST
Payer: MEDICARE

## 2025-06-13 VITALS
BODY MASS INDEX: 30 KG/M2 | SYSTOLIC BLOOD PRESSURE: 212 MMHG | DIASTOLIC BLOOD PRESSURE: 126 MMHG | OXYGEN SATURATION: 98 % | HEART RATE: 89 BPM | WEIGHT: 163 LBS | HEIGHT: 62 IN | TEMPERATURE: 98 F

## 2025-06-13 DIAGNOSIS — N18.4 STAGE 4 CHRONIC KIDNEY DISEASE (HCC): ICD-10-CM

## 2025-06-13 DIAGNOSIS — R19.7 DIARRHEA, UNSPECIFIED TYPE: ICD-10-CM

## 2025-06-13 DIAGNOSIS — I10 ESSENTIAL HYPERTENSION: ICD-10-CM

## 2025-06-13 DIAGNOSIS — N17.9 ACUTE RENAL FAILURE SUPERIMPOSED ON CHRONIC KIDNEY DISEASE, UNSPECIFIED ACUTE RENAL FAILURE TYPE, UNSPECIFIED CKD STAGE: Primary | ICD-10-CM

## 2025-06-13 DIAGNOSIS — N18.9 ACUTE RENAL FAILURE SUPERIMPOSED ON CHRONIC KIDNEY DISEASE, UNSPECIFIED ACUTE RENAL FAILURE TYPE, UNSPECIFIED CKD STAGE: Primary | ICD-10-CM

## 2025-06-13 DIAGNOSIS — R63.4 WEIGHT LOSS: Primary | ICD-10-CM

## 2025-06-13 DIAGNOSIS — I16.0 ASYMPTOMATIC HYPERTENSIVE URGENCY: ICD-10-CM

## 2025-06-13 DIAGNOSIS — E78.2 MIXED HYPERLIPIDEMIA: ICD-10-CM

## 2025-06-13 DIAGNOSIS — K52.9 ENTERITIS: ICD-10-CM

## 2025-06-13 DIAGNOSIS — R10.84 GENERALIZED ABDOMINAL PAIN: ICD-10-CM

## 2025-06-13 DIAGNOSIS — F81.9 LEARNING DISABILITY: ICD-10-CM

## 2025-06-13 DIAGNOSIS — J81.0 ACUTE PULMONARY EDEMA (HCC): ICD-10-CM

## 2025-06-13 DIAGNOSIS — E11.9 DIABETES MELLITUS TYPE 2, DIET-CONTROLLED (HCC): ICD-10-CM

## 2025-06-13 LAB
ABO/RH: NORMAL
ALBUMIN SERPL-MCNC: 4 G/DL (ref 3.4–5)
ALBUMIN/GLOB SERPL: 1.1 {RATIO} (ref 1.1–2.2)
ALP SERPL-CCNC: 86 U/L (ref 40–129)
ALT SERPL-CCNC: 10 U/L (ref 10–40)
ANION GAP SERPL CALCULATED.3IONS-SCNC: 28 MMOL/L (ref 3–16)
ANTIBODY SCREEN: NORMAL
AST SERPL-CCNC: 12 U/L (ref 15–37)
BACTERIA URNS QL MICRO: ABNORMAL /HPF
BASOPHILS # BLD: 0.1 K/UL (ref 0–0.2)
BASOPHILS NFR BLD: 0.8 %
BILIRUB SERPL-MCNC: 0.3 MG/DL (ref 0–1)
BILIRUB UR QL STRIP.AUTO: NEGATIVE
BUN SERPL-MCNC: 126 MG/DL (ref 7–20)
C3 SERPL-MCNC: 134 MG/DL (ref 90–180)
C4 SERPL-MCNC: 22.4 MG/DL (ref 10–40)
CALCIUM SERPL-MCNC: 8.9 MG/DL (ref 8.3–10.6)
CHLORIDE SERPL-SCNC: 96 MMOL/L (ref 99–110)
CK SERPL-CCNC: 184 U/L (ref 26–192)
CLARITY UR: ABNORMAL
CO2 SERPL-SCNC: 12 MMOL/L (ref 21–32)
COLOR UR: ABNORMAL
CREAT SERPL-MCNC: 20.5 MG/DL (ref 0.6–1.1)
DEPRECATED RDW RBC AUTO: 17.1 % (ref 12.4–15.4)
EOSINOPHIL # BLD: 0 K/UL (ref 0–0.6)
EOSINOPHIL NFR BLD: 0.5 %
EPI CELLS #/AREA URNS AUTO: 1 /HPF (ref 0–5)
GFR SERPLBLD CREATININE-BSD FMLA CKD-EPI: 2 ML/MIN/{1.73_M2}
GLUCOSE SERPL-MCNC: 104 MG/DL (ref 70–99)
GLUCOSE UR STRIP.AUTO-MCNC: 100 MG/DL
HAV IGM SERPL QL IA: NORMAL
HBA1C MFR BLD: 5.6 %
HBV CORE IGM SERPL QL IA: NORMAL
HBV SURFACE AG SERPL QL IA: NORMAL
HCG SERPL QL: NEGATIVE
HCT VFR BLD AUTO: 19.3 % (ref 36–48)
HCT VFR BLD AUTO: 19.9 % (ref 36–48)
HCV AB SERPL QL IA: NORMAL
HGB BLD-MCNC: 6.2 G/DL (ref 12–16)
HGB BLD-MCNC: 6.5 G/DL (ref 12–16)
HGB UR QL STRIP.AUTO: ABNORMAL
HYALINE CASTS #/AREA URNS AUTO: 1 /LPF (ref 0–8)
KETONES UR STRIP.AUTO-MCNC: ABNORMAL MG/DL
LEUKOCYTE ESTERASE UR QL STRIP.AUTO: ABNORMAL
LIPASE SERPL-CCNC: 37 U/L (ref 13–60)
LYMPHOCYTES # BLD: 1.3 K/UL (ref 1–5.1)
LYMPHOCYTES NFR BLD: 13.8 %
MCH RBC QN AUTO: 26.7 PG (ref 26–34)
MCHC RBC AUTO-ENTMCNC: 32.3 G/DL (ref 31–36)
MCV RBC AUTO: 82.8 FL (ref 80–100)
MONOCYTES # BLD: 0.7 K/UL (ref 0–1.3)
MONOCYTES NFR BLD: 7.8 %
NEUTROPHILS # BLD: 7.1 K/UL (ref 1.7–7.7)
NEUTROPHILS NFR BLD: 77.1 %
NITRITE UR QL STRIP.AUTO: NEGATIVE
PH UR STRIP.AUTO: 6 [PH] (ref 5–8)
PLATELET # BLD AUTO: 295 K/UL (ref 135–450)
PMV BLD AUTO: 9.3 FL (ref 5–10.5)
POTASSIUM SERPL-SCNC: 3.7 MMOL/L (ref 3.5–5.1)
PROT SERPL-MCNC: 7.8 G/DL (ref 6.4–8.2)
PROT UR STRIP.AUTO-MCNC: >=300 MG/DL
PTH-INTACT SERPL-MCNC: 940 PG/ML (ref 14–72)
RBC # BLD AUTO: 2.33 M/UL (ref 4–5.2)
RBC CLUMPS #/AREA URNS AUTO: 1852 /HPF (ref 0–4)
SODIUM SERPL-SCNC: 136 MMOL/L (ref 136–145)
SP GR UR STRIP.AUTO: 1.02 (ref 1–1.03)
TROPONIN, HIGH SENSITIVITY: 65 NG/L (ref 0–14)
TROPONIN, HIGH SENSITIVITY: 70 NG/L (ref 0–14)
UA COMPLETE W REFLEX CULTURE PNL UR: YES
UA DIPSTICK W REFLEX MICRO PNL UR: YES
URN SPEC COLLECT METH UR: ABNORMAL
UROBILINOGEN UR STRIP-ACNC: 0.2 E.U./DL
WBC # BLD AUTO: 9.2 K/UL (ref 4–11)
WBC #/AREA URNS AUTO: 86 /HPF (ref 0–5)

## 2025-06-13 PROCEDURE — 83516 IMMUNOASSAY NONANTIBODY: CPT

## 2025-06-13 PROCEDURE — 86901 BLOOD TYPING SEROLOGIC RH(D): CPT

## 2025-06-13 PROCEDURE — 85014 HEMATOCRIT: CPT

## 2025-06-13 PROCEDURE — 83690 ASSAY OF LIPASE: CPT

## 2025-06-13 PROCEDURE — 6360000002 HC RX W HCPCS: Performed by: INTERNAL MEDICINE

## 2025-06-13 PROCEDURE — 87390 HIV-1 AG IA: CPT

## 2025-06-13 PROCEDURE — 86900 BLOOD TYPING SEROLOGIC ABO: CPT

## 2025-06-13 PROCEDURE — 86701 HIV-1ANTIBODY: CPT

## 2025-06-13 PROCEDURE — 2580000003 HC RX 258: Performed by: HOSPITALIST

## 2025-06-13 PROCEDURE — 96375 TX/PRO/DX INJ NEW DRUG ADDON: CPT

## 2025-06-13 PROCEDURE — 6370000000 HC RX 637 (ALT 250 FOR IP): Performed by: HOSPITALIST

## 2025-06-13 PROCEDURE — 80074 ACUTE HEPATITIS PANEL: CPT

## 2025-06-13 PROCEDURE — 81001 URINALYSIS AUTO W/SCOPE: CPT

## 2025-06-13 PROCEDURE — 2580000003 HC RX 258: Performed by: INTERNAL MEDICINE

## 2025-06-13 PROCEDURE — 84165 PROTEIN E-PHORESIS SERUM: CPT

## 2025-06-13 PROCEDURE — 82550 ASSAY OF CK (CPK): CPT

## 2025-06-13 PROCEDURE — 87086 URINE CULTURE/COLONY COUNT: CPT

## 2025-06-13 PROCEDURE — 96374 THER/PROPH/DIAG INJ IV PUSH: CPT

## 2025-06-13 PROCEDURE — 84155 ASSAY OF PROTEIN SERUM: CPT

## 2025-06-13 PROCEDURE — 93005 ELECTROCARDIOGRAM TRACING: CPT | Performed by: PHYSICIAN ASSISTANT

## 2025-06-13 PROCEDURE — 83970 ASSAY OF PARATHORMONE: CPT

## 2025-06-13 PROCEDURE — 2500000003 HC RX 250 WO HCPCS: Performed by: INTERNAL MEDICINE

## 2025-06-13 PROCEDURE — 86850 RBC ANTIBODY SCREEN: CPT

## 2025-06-13 PROCEDURE — 6360000002 HC RX W HCPCS: Performed by: HOSPITALIST

## 2025-06-13 PROCEDURE — 86923 COMPATIBILITY TEST ELECTRIC: CPT

## 2025-06-13 PROCEDURE — 85025 COMPLETE CBC W/AUTO DIFF WBC: CPT

## 2025-06-13 PROCEDURE — 96365 THER/PROPH/DIAG IV INF INIT: CPT

## 2025-06-13 PROCEDURE — P9016 RBC LEUKOCYTES REDUCED: HCPCS

## 2025-06-13 PROCEDURE — 6360000002 HC RX W HCPCS: Performed by: PHYSICIAN ASSISTANT

## 2025-06-13 PROCEDURE — 84703 CHORIONIC GONADOTROPIN ASSAY: CPT

## 2025-06-13 PROCEDURE — 85018 HEMOGLOBIN: CPT

## 2025-06-13 PROCEDURE — 36430 TRANSFUSION BLD/BLD COMPNT: CPT

## 2025-06-13 PROCEDURE — 99285 EMERGENCY DEPT VISIT HI MDM: CPT

## 2025-06-13 PROCEDURE — 86160 COMPLEMENT ANTIGEN: CPT

## 2025-06-13 PROCEDURE — 1200000000 HC SEMI PRIVATE

## 2025-06-13 PROCEDURE — 30233N1 TRANSFUSION OF NONAUTOLOGOUS RED BLOOD CELLS INTO PERIPHERAL VEIN, PERCUTANEOUS APPROACH: ICD-10-PCS | Performed by: HOSPITALIST

## 2025-06-13 PROCEDURE — 74176 CT ABD & PELVIS W/O CONTRAST: CPT

## 2025-06-13 PROCEDURE — 36415 COLL VENOUS BLD VENIPUNCTURE: CPT

## 2025-06-13 PROCEDURE — 84484 ASSAY OF TROPONIN QUANT: CPT

## 2025-06-13 PROCEDURE — 2060000000 HC ICU INTERMEDIATE R&B

## 2025-06-13 PROCEDURE — 83521 IG LIGHT CHAINS FREE EACH: CPT

## 2025-06-13 PROCEDURE — 2500000003 HC RX 250 WO HCPCS: Performed by: HOSPITALIST

## 2025-06-13 PROCEDURE — 86702 HIV-2 ANTIBODY: CPT

## 2025-06-13 PROCEDURE — 80053 COMPREHEN METABOLIC PANEL: CPT

## 2025-06-13 RX ORDER — SODIUM CHLORIDE 9 MG/ML
INJECTION, SOLUTION INTRAVENOUS PRN
Status: DISCONTINUED | OUTPATIENT
Start: 2025-06-13 | End: 2025-06-13

## 2025-06-13 RX ORDER — ONDANSETRON 4 MG/1
4 TABLET, ORALLY DISINTEGRATING ORAL EVERY 8 HOURS PRN
Status: DISCONTINUED | OUTPATIENT
Start: 2025-06-13 | End: 2025-06-21 | Stop reason: HOSPADM

## 2025-06-13 RX ORDER — ACETAMINOPHEN 325 MG/1
650 TABLET ORAL EVERY 6 HOURS PRN
Status: DISCONTINUED | OUTPATIENT
Start: 2025-06-13 | End: 2025-06-21 | Stop reason: HOSPADM

## 2025-06-13 RX ORDER — MORPHINE SULFATE 4 MG/ML
4 INJECTION, SOLUTION INTRAMUSCULAR; INTRAVENOUS ONCE
Refills: 0 | Status: COMPLETED | OUTPATIENT
Start: 2025-06-13 | End: 2025-06-13

## 2025-06-13 RX ORDER — ERGOCALCIFEROL 1.25 MG/1
50000 CAPSULE, LIQUID FILLED ORAL WEEKLY
Status: DISCONTINUED | OUTPATIENT
Start: 2025-06-20 | End: 2025-06-20 | Stop reason: SDUPTHER

## 2025-06-13 RX ORDER — NIFEDIPINE 60 MG/1
60 TABLET, EXTENDED RELEASE ORAL 2 TIMES DAILY
Qty: 180 TABLET | Refills: 1 | Status: ON HOLD | OUTPATIENT
Start: 2025-06-13

## 2025-06-13 RX ORDER — SODIUM CHLORIDE 9 MG/ML
INJECTION, SOLUTION INTRAVENOUS PRN
Status: DISCONTINUED | OUTPATIENT
Start: 2025-06-13 | End: 2025-06-21 | Stop reason: HOSPADM

## 2025-06-13 RX ORDER — LOPERAMIDE HYDROCHLORIDE 2 MG/1
2 CAPSULE ORAL 4 TIMES DAILY PRN
Status: DISCONTINUED | OUTPATIENT
Start: 2025-06-13 | End: 2025-06-21 | Stop reason: HOSPADM

## 2025-06-13 RX ORDER — ATORVASTATIN CALCIUM 40 MG/1
40 TABLET, FILM COATED ORAL NIGHTLY
Status: DISCONTINUED | OUTPATIENT
Start: 2025-06-13 | End: 2025-06-21 | Stop reason: HOSPADM

## 2025-06-13 RX ORDER — ONDANSETRON 2 MG/ML
4 INJECTION INTRAMUSCULAR; INTRAVENOUS EVERY 6 HOURS PRN
Status: DISCONTINUED | OUTPATIENT
Start: 2025-06-13 | End: 2025-06-21 | Stop reason: HOSPADM

## 2025-06-13 RX ORDER — SODIUM CHLORIDE 9 MG/ML
INJECTION, SOLUTION INTRAVENOUS PRN
Status: DISCONTINUED | OUTPATIENT
Start: 2025-06-13 | End: 2025-06-18

## 2025-06-13 RX ORDER — ACETAMINOPHEN 650 MG/1
650 SUPPOSITORY RECTAL EVERY 6 HOURS PRN
Status: DISCONTINUED | OUTPATIENT
Start: 2025-06-13 | End: 2025-06-21 | Stop reason: HOSPADM

## 2025-06-13 RX ORDER — NIFEDIPINE 60 MG/1
60 TABLET, EXTENDED RELEASE ORAL 2 TIMES DAILY
Status: DISCONTINUED | OUTPATIENT
Start: 2025-06-13 | End: 2025-06-14

## 2025-06-13 RX ORDER — FUROSEMIDE 10 MG/ML
80 INJECTION INTRAMUSCULAR; INTRAVENOUS ONCE
Status: COMPLETED | OUTPATIENT
Start: 2025-06-13 | End: 2025-06-13

## 2025-06-13 RX ORDER — LABETALOL 300 MG/1
300 TABLET, FILM COATED ORAL 2 TIMES DAILY
Qty: 180 TABLET | Refills: 1 | Status: ON HOLD | OUTPATIENT
Start: 2025-06-13

## 2025-06-13 RX ORDER — SODIUM CHLORIDE 0.9 % (FLUSH) 0.9 %
5-40 SYRINGE (ML) INJECTION EVERY 12 HOURS SCHEDULED
Status: DISCONTINUED | OUTPATIENT
Start: 2025-06-13 | End: 2025-06-21 | Stop reason: HOSPADM

## 2025-06-13 RX ORDER — HEPARIN SODIUM 5000 [USP'U]/ML
5000 INJECTION, SOLUTION INTRAVENOUS; SUBCUTANEOUS EVERY 8 HOURS SCHEDULED
Status: DISCONTINUED | OUTPATIENT
Start: 2025-06-13 | End: 2025-06-14

## 2025-06-13 RX ORDER — SODIUM CHLORIDE 0.9 % (FLUSH) 0.9 %
5-40 SYRINGE (ML) INJECTION PRN
Status: DISCONTINUED | OUTPATIENT
Start: 2025-06-13 | End: 2025-06-21 | Stop reason: HOSPADM

## 2025-06-13 RX ORDER — POLYETHYLENE GLYCOL 3350 17 G/17G
17 POWDER, FOR SOLUTION ORAL DAILY PRN
Status: DISCONTINUED | OUTPATIENT
Start: 2025-06-13 | End: 2025-06-21 | Stop reason: HOSPADM

## 2025-06-13 RX ADMIN — LABETALOL HYDROCHLORIDE 300 MG: 200 TABLET, FILM COATED ORAL at 23:21

## 2025-06-13 RX ADMIN — FUROSEMIDE 80 MG: 10 INJECTION, SOLUTION INTRAMUSCULAR; INTRAVENOUS at 18:20

## 2025-06-13 RX ADMIN — ATORVASTATIN CALCIUM 40 MG: 40 TABLET, FILM COATED ORAL at 22:09

## 2025-06-13 RX ADMIN — ONDANSETRON 4 MG: 2 INJECTION, SOLUTION INTRAMUSCULAR; INTRAVENOUS at 22:41

## 2025-06-13 RX ADMIN — EPOETIN ALFA-EPBX 3000 UNITS: 3000 INJECTION, SOLUTION INTRAVENOUS; SUBCUTANEOUS at 21:22

## 2025-06-13 RX ADMIN — HEPARIN SODIUM 5000 UNITS: 5000 INJECTION INTRAVENOUS; SUBCUTANEOUS at 22:43

## 2025-06-13 RX ADMIN — METHYLPREDNISOLONE SODIUM SUCCINATE 500 MG: 500 INJECTION INTRAMUSCULAR; INTRAVENOUS at 22:09

## 2025-06-13 RX ADMIN — PIPERACILLIN AND TAZOBACTAM 3375 MG: 3; .375 INJECTION, POWDER, LYOPHILIZED, FOR SOLUTION INTRAVENOUS at 23:17

## 2025-06-13 RX ADMIN — MORPHINE SULFATE 4 MG: 4 INJECTION, SOLUTION INTRAMUSCULAR; INTRAVENOUS at 15:39

## 2025-06-13 RX ADMIN — NIFEDIPINE 60 MG: 60 TABLET, EXTENDED RELEASE ORAL at 23:21

## 2025-06-13 RX ADMIN — SODIUM BICARBONATE: 84 INJECTION, SOLUTION INTRAVENOUS at 18:30

## 2025-06-13 RX ADMIN — SODIUM CHLORIDE, PRESERVATIVE FREE 10 ML: 5 INJECTION INTRAVENOUS at 22:08

## 2025-06-13 SDOH — ECONOMIC STABILITY: FOOD INSECURITY: WITHIN THE PAST 12 MONTHS, YOU WORRIED THAT YOUR FOOD WOULD RUN OUT BEFORE YOU GOT MONEY TO BUY MORE.: NEVER TRUE

## 2025-06-13 SDOH — ECONOMIC STABILITY: FOOD INSECURITY: WITHIN THE PAST 12 MONTHS, THE FOOD YOU BOUGHT JUST DIDN'T LAST AND YOU DIDN'T HAVE MONEY TO GET MORE.: NEVER TRUE

## 2025-06-13 ASSESSMENT — PAIN SCALES - GENERAL
PAINLEVEL_OUTOF10: 6
PAINLEVEL_OUTOF10: 6
PAINLEVEL_OUTOF10: 0
PAINLEVEL_OUTOF10: 3
PAINLEVEL_OUTOF10: 6
PAINLEVEL_OUTOF10: 0
PAINLEVEL_OUTOF10: 0

## 2025-06-13 ASSESSMENT — PATIENT HEALTH QUESTIONNAIRE - PHQ9
SUM OF ALL RESPONSES TO PHQ QUESTIONS 1-9: 0
1. LITTLE INTEREST OR PLEASURE IN DOING THINGS: NOT AT ALL
SUM OF ALL RESPONSES TO PHQ QUESTIONS 1-9: 0
2. FEELING DOWN, DEPRESSED OR HOPELESS: NOT AT ALL

## 2025-06-13 ASSESSMENT — PAIN - FUNCTIONAL ASSESSMENT
PAIN_FUNCTIONAL_ASSESSMENT: 0-10
PAIN_FUNCTIONAL_ASSESSMENT: 0-10

## 2025-06-13 ASSESSMENT — PAIN DESCRIPTION - LOCATION
LOCATION: ABDOMEN

## 2025-06-13 ASSESSMENT — ENCOUNTER SYMPTOMS
DIARRHEA: 1
ABDOMINAL PAIN: 1
CONSTIPATION: 0
SHORTNESS OF BREATH: 0
BLOOD IN STOOL: 0

## 2025-06-13 ASSESSMENT — PAIN DESCRIPTION - FREQUENCY: FREQUENCY: CONTINUOUS

## 2025-06-13 ASSESSMENT — PAIN DESCRIPTION - DESCRIPTORS: DESCRIPTORS: ACHING

## 2025-06-13 ASSESSMENT — PAIN DESCRIPTION - ORIENTATION: ORIENTATION: MID

## 2025-06-13 NOTE — CONSULTS
Nephrology Consult Note                                                                                                                                                                                                                                                                                                                                                               Office : [phone]     Fax :[phone]    Patient's Name: Erinn Kulkarni  11:01 AM  6/14/2025    Reason for Consult:  TELLY   Requesting Physician:  Diego Licona MD  Chief Complaint:    Chief Complaint   Patient presents with    Hypertension     Patient sent from primary care office for high blood pressure. Patient was at appt for abd pain with significant weight loss over the last month.        Assessment/Plan       # TELLY on CKD 3/4    # HTN    # Anemia    # Acid- base/ Electrolyte imbalance     # MBD     Plan   - No obstruction  - Ur studies - Hematuria and proteinuria   - r/o GN  - IV solumedrol   - serology pending  - renal biopsy   - Bicarb gtt   - monitor vol status   - will need to start RRT     Recommend to dose adjust all medications  based on renal functions  Maintain SBP> 90 mmHg   Daily weights   AVOID NSAIDs  Avoid Nephrotoxins  Monitor Intake/Output  Call if significant decrease in urine output      History of Present Ilness:    Erinn Kulkarni is a 44 y.o. female with pmh of hypertension, CKD stage IIIb/4 who presents with   Complaint of diarrhea  Onset of diarrhea since more than week  Per patient diarrhea is watery in nature not foul-smelling and denies any blood in diarrhea  Associated with mild abdominal cramps  Denies any fever or chills  Denies any nausea or vomiting  Denies shortness of breath or chest pain  She was suppose to see us next week but ended up here          Interval hx     No past medical history on file.    No past surgical history on file.    Family History   Problem Relation Age of Onset

## 2025-06-13 NOTE — ED NOTES
ED TO INPATIENT SBAR HANDOFF    Patient Name: Erinn Kulkarni   Preferred Name: Erinn  : 1980  44 y.o.   Family/Caregiver Present: no   Code Status Order: No Order  PO Status: NPO:Yes  Telemetry Order: Yes  C-SSRS:    Sitter no NA  Restraints:     Sepsis Risk Score      Situation  Chief Complaint   Patient presents with    Hypertension     Patient sent from primary care office for high blood pressure. Patient was at appt for abd pain with significant weight loss over the last month.      Brief Description of Patient's Condition: Acute renal failure  Mental Status: oriented, alert, coherent, logical, thought processes intact, and able to concentrate and follow conversation  Arrived from:Home  Imaging:   CT ABDOMEN PELVIS WO CONTRAST Additional Contrast? None   Final Result   Pulmonary edema and small bilateral pleural effusions.      Possible mild wall thickening of small bowel loops in the left upper   abdominal quadrant which could represent mild enteritis.      No hydronephrosis.           Abnormal labs:   Abnormal Labs Reviewed   CBC WITH AUTO DIFFERENTIAL - Abnormal; Notable for the following components:       Result Value    RBC 2.33 (*)     Hemoglobin 6.2 (*)     Hematocrit 19.3 (*)     RDW 17.1 (*)     All other components within normal limits    Narrative:     CALL  [employer] tel. [phone],  Hematology results called to and read back by Blanka Hercules RN,  2025 15:48, by MCKENNAIADAGO   COMPREHENSIVE METABOLIC PANEL W/ REFLEX TO MG FOR LOW K - Abnormal; Notable for the following components:    Chloride 96 (*)     CO2 12 (*)     Anion Gap 28 (*)     Glucose 104 (*)      (*)     Creatinine 20.5 (*)     Est, Glom Filt Rate 2 (*)     AST 12 (*)     All other components within normal limits    Narrative:     CALL  [employer] tel. [phone],  Chemistry results called to and read back by Blanka Hrecules RN,  2025 16:15, by FRANCESCA  Chemistry results called to and read

## 2025-06-13 NOTE — ED PROVIDER NOTES
All other components within normal limits    Narrative:     CALL  [employer] tel. [phone],  Chemistry results called to and read back by Blanka Hercules RN,  06/13/2025 16:15, by [employer]  Chemistry results called to and read back by Blanka Hercules RN,  06/13/2025 16:15, by [employer]   TROPONIN - Abnormal; Notable for the following components:    Troponin, High Sensitivity 70 (*)     All other components within normal limits    Narrative:     CALL  [employer] tel. [phone],  Chemistry results called to and read back by Blanka Hercules RN,  06/13/2025 16:15, by [employer]  Chemistry results called to and read back by Blanka Hercules RN,  06/13/2025 16:15, by [employer]   TROPONIN - Abnormal; Notable for the following components:    Troponin, High Sensitivity 65 (*)     All other components within normal limits   HEMOGLOBIN AND HEMATOCRIT - Abnormal; Notable for the following components:    Hemoglobin 6.5 (*)     Hematocrit 19.9 (*)     All other components within normal limits    Narrative:     confirmation for critical HH.  CALL  [employer] tel. [phone],  Hematology results called to and read back by Blanka Hercules RN,  06/13/2025 18:03, by CAL   PTH, INTACT - Abnormal; Notable for the following components:    Pth Intact 940.0 (*)     All other components within normal limits   URINALYSIS WITH REFLEX TO CULTURE - Abnormal; Notable for the following components:    Clarity, UA CLOUDY (*)     Glucose, Ur 100 (*)     Ketones, Urine TRACE (*)     Blood, Urine LARGE (*)     Protein, UA >=300 (*)     Leukocyte Esterase, Urine TRACE (*)     All other components within normal limits   MICROSCOPIC URINALYSIS - Abnormal; Notable for the following components:    Bacteria, UA 3+ (*)     WBC, UA 86 (*)     RBC, UA 1852 (*)     All other components within normal limits   GASTROINTESTINAL PANEL, MOLECULAR   C DIFF TOXIN/ANTIGEN   CULTURE, URINE   LIPASE    Narrative:     CALL  [employer] tel.  [phone],  Chemistry results called to and read back by Blanka Hercules RN,  06/13/2025 16:15, by FRANCESCA  Chemistry results called to and read back by Blanka Hercules RN,  06/13/2025 16:15, by FRANCESCA   HCG, SERUM, QUALITATIVE   CK    Narrative:     CALL  [employer] [facility] tel. [phone],  Chemistry results called to and read back by Blanka Hercules RN,  06/13/2025 16:15, by FRANCESCA  Chemistry results called to and read back by Blanka Hercules RN,  06/13/2025 16:15, by FRANCESCA   KAPPA/LAMBDA QUANTITATIVE FREE LIGHT CHAINS, SERUM   C3 COMPLEMENT   C4 COMPLEMENT   HEPATITIS PANEL, ACUTE   ELECTROPHORESIS PROTEIN, SERUM   URINALYSIS WITH REFLEX TO CULTURE   HIV SCREEN   MPO/TN-3(ANCA) ABS   GLOMERULAR BASEMENT MEMBRANE (GBM) ANTIBODY IGG   RENAL FUNCTION PANEL   IRON AND TIBC   CBC WITH AUTO DIFFERENTIAL   MICROSCOPIC URINALYSIS   ELECTROPHORESIS PROTEIN, SERUM   TYPE AND SCREEN   PREPARE RBC (CROSSMATCH)   PREPARE RBC (CROSSMATCH)   PREPARE RBC (CROSSMATCH)       Patient seen and evaluated.  Relevant records reviewed.  [facility]     ED Course as of 06/13/25 2344   Fri Jun 13, 2025   1656 EKG interpreted by me: Normal sinus rhythm with rate of 89, normal axis and QTc, no ischemic findings and no prior EKG available for comparison. [MR]      ED Course User Index  [MR] Vinny Ybarra MD     Labs significant for significant anemia as well as severely worsened renal failure.  Nephrology consulted and recommends erythropoietin as well as transfusion.  Patient admitted in stable condition.        CLINICAL IMPRESSION  1. Acute renal failure superimposed on chronic kidney disease, unspecified acute renal failure type, unspecified CKD stage    2. Enteritis    3. Acute pulmonary edema (HCC)          Vinny ADDISON MD, am the primary clinician of record.  At the time of this note, I personally saw the patient and made/approved the management plan and take responsibility for the patient management.  I personally saw

## 2025-06-13 NOTE — PROGRESS NOTES
2025     Erinn Kulkarni (:  1980) is a 44 y.o. female, here for evaluation of the following medical concerns:    Weight Loss  Associated symptoms include abdominal pain. Pertinent negatives include no chest pain.   Abdominal Pain  Associated symptoms include diarrhea and weight loss. Pertinent negatives include no constipation, frequency or hematuria.     Patient is 44 years old female patient of Dr. Diego Licona, has no regular follow-up last seen in .  Her medical history is significant for hypertension, diet-controlled diabetes, hyperlipidemia. And  Learning disability.  Patient presented to the office accompanied by her sister due to weight loss.  She also complain of vague abdominal pain and intermittent diarrhea for more than a week.  She denies nausea and vomiting.  On presentation her blood pressure is markedly elevated at 212 systolic..  She denies headache blurry vision chest pain or shortness of breath.    Review of Systems   Constitutional:  Positive for unexpected weight change and weight loss. Negative for activity change and appetite change.   Eyes:  Negative for visual disturbance.   Respiratory:  Negative for shortness of breath.    Cardiovascular:  Negative for chest pain and leg swelling.   Gastrointestinal:  Positive for abdominal pain and diarrhea. Negative for blood in stool and constipation.   Genitourinary:  Negative for difficulty urinating, frequency, hematuria, menstrual problem and urgency.   Neurological:  Negative for dizziness and syncope.   Psychiatric/Behavioral:  Negative for behavioral problems.        Prior to Visit Medications    Medication Sig Taking? Authorizing Provider   NIFEdipine (PROCARDIA XL) 60 MG extended release tablet Take 1 tablet by mouth in the morning and 1 tablet in the evening. Yes Kalia West MD   labetalol (NORMODYNE) 300 MG tablet Take 1 tablet by mouth 2 times daily Yes Kalia West MD   atorvastatin (LIPITOR) 40 MG

## 2025-06-13 NOTE — H&P
V2.0  History and Physical      Name:  Erinn Kulkrani /Age/Sex: 1980  (44 y.o. female)   MRN & CSN:  [id number] & [id number] Encounter Date/Time: 2025 6:49 PM EDT   Location:   PCP: Diego Licona MD       Hospital Day: 1    Assessment and Plan:   Erinn Kulkarni is a 44 y.o. female with a pmh of  who presents with Acute renal failure (ARF)    Hospital Problems           Last Modified POA    * (Principal) Acute renal failure (ARF) 2025 Yes       Acute renal failure  CKD stage 4  Anion gap metabolic acidosis  Diarrhea  Possible enteritis  Anemia     Plan:      Admit inpatient status  Telemetry monitering    TELLY  on CKD 3b/4 : Etiology seems prerenal from intravascular volume depletion  There could be a component of ATN as well  IVF with sodium bicarb  Moniter BMP  Avoid NSAIDs  Avoid contrast  Avoid ACEI or ARB  Keep MAP > 65 mmhg  Nephrolog consult    Anemia : could be 2/2 CKD     Transfuse 1 U PRBC transfusion  ED took PRBC transfusion consent  Anemia work up    Diarrhea   CT abd s/o possible enteritis    IV zosyn  Give IV fluid  Imodium as needed for diarrhea  Check stool for cdiff and GI pathogen    Chronic conditions :    CKD 3b/4    HTN : cont BP meds      DVT ppx  : lovenox    Diet : regular diet    Code status  : full code              Disposition:   Current Living situation:home  Expected Disposition: home  Diet No diet orders on file   DVT Prophylaxis [] Lovenox, []  Heparin, [] SCDs, [] Ambulation,  [] Eliquis, [] Xarelto, [] Coumadin   Code Status No Order   Surrogate Decision Maker/ POA      Personally reviewed Lab Studies and Imaging      Discussed management of the case with ED  who recommended Hospital admission     EKG interpreted personally and results : no STEMI     Imaging that was interpreted personally includes  CXR  and results : no PNA     Drugs that require monitoring for toxicity include IVF  and the method of monitoring was Vitals monitering,

## 2025-06-13 NOTE — CONSENT
Informed Consent for Blood Component Transfusion Note    I have discussed with the patient the rationale for blood component transfusion; its benefits in treating or preventing fatigue, organ damage, or death; and its risk which includes mild transfusion reactions, rare risk of blood borne infection, or more serious but rare reactions. I have discussed the alternatives to transfusion, including the risk and consequences of not receiving transfusion. The patient had an opportunity to ask questions and had agreed to proceed with transfusion of blood components.    Electronically signed by Maritza Wahl PA-C on 6/13/25 at 4:28 PM EDT

## 2025-06-13 NOTE — PROGRESS NOTES
Medication Reconciliation    List of medications patient is currently taking is complete.     Source of information: 1. Conversation with patient at bedside                                      2. EPIC records      Allergies  Patient has no known allergies.     Notes regarding home medications:   1. Patient received all of her home medications prior to arrival to the emergency department.  2. 6/13 AM patient took labetolol 200mg, nifedipine XL 60mg, and hydrochlorothiazide 25mg. Medication list updated at office visit after morning meds.   Patient states medication is \"going right through her\", as she has been experiencing diarrhea for several days.     Julia Quevedo, Pharmacy Intern  6/13/2025 4:37 PM

## 2025-06-13 NOTE — ED NOTES
Provider contacted as blood bank on phone with this nurse to release order for blood; as blood bank [facility] blood is ready.

## 2025-06-13 NOTE — ED PROVIDER NOTES
Blanka Hercules RN,  06/13/2025 16:15, by FRANCESCA   TROPONIN - Abnormal; Notable for the following components:    Troponin, High Sensitivity 65 (*)     All other components within normal limits   HEMOGLOBIN AND HEMATOCRIT - Abnormal; Notable for the following components:    Hemoglobin 6.5 (*)     Hematocrit 19.9 (*)     All other components within normal limits    Narrative:     confirmation for critical HH.  CALL  [employer] tel. [phone],  Hematology results called to and read back by Blanka Hercules RN,  06/13/2025 18:03, by CAL GREGORY    Narrative:     CALL  [employer] tel. [phone],  Chemistry results called to and read back by Blanka Hercules RN,  06/13/2025 16:15, by FRANCESCA  Chemistry results called to and read back by Blanka Hercules RN,  06/13/2025 16:15, by FRANCESCA   HCG, SERUM, QUALITATIVE   CK    Narrative:     CALL  [employer] tel. [phone],  Chemistry results called to and read back by Blanka Hercules RN,  06/13/2025 16:15, by FRANCESCA  Chemistry results called to and read back by Blanka Hercules RN,  06/13/2025 16:15, by SOLAS   KAPPA/LAMBDA QUANTITATIVE FREE LIGHT CHAINS, SERUM   C3 COMPLEMENT   C4 COMPLEMENT   URINALYSIS WITH REFLEX TO CULTURE   PROTEIN / CREATININE RATIO, URINE   MICROSCOPIC URINALYSIS   HIV SCREEN   MPO/OR-3(ANCA) ABS   GLOMERULAR BASEMENT MEMBRANE (GBM) ANTIBODY IGG   HEPATITIS PANEL, ACUTE   PTH, INTACT   RENAL FUNCTION PANEL   IRON AND TIBC   TYPE AND SCREEN   PREPARE RBC (CROSSMATCH)       When ordered only abnormal lab results are displayed. All other labs were within normal range or not returned as of this dictation.    EKG: When ordered, EKG's are interpreted by the Emergency Department Physician in the absence of a cardiologist.  Please see their note for interpretation of EKG.    EKG obtained.  See physician note for interpretation    RADIOLOGY:   Non-plain film images such as CT, Ultrasound and MRI are read by the radiologist. Plain  signs with exception of hypertension 208/111.  Repeat /94.  On exam, has mild tenderness to left side abdomen.     Records reviewed:  - Reviewed EP note from visit today-seen for weight loss, abdominal pain,  diarrhea.  BP was 212/126.  Also hx of HLD, T2DM. A1c was 5.6 today    Differential diagnosis includes diverticulitis, enteritis, bowel obstruction, dehydration, metabolic abnormality, other    Unsure about hx of weight loss - weight here 177 lb,.  Weight at PCP office today 163 lb. Weight Sept 2024 182 lb    Labs show significant anemia with hemoglobin 6.2.  Recommended blood transfusion to the patient.  Discussed risk benefit of PRBC transfusion and that benefit outweighs risk.  Patient agreeable to transfusion.  PRBC ordered.    Metabolic panel significantly abnormal with creatinine of 20.5 and BUN of 126.  Patient a creatinine of 2.6 about a year and a half ago.  Has TELLY on CKD.  She has no nephrologist.  She had been was referred to 1 but had difficulty getting in.  PCP gave her a referral to Dr. Torres recently and she has an appointment with him next week.  Will consult him. K is normal    Discussed case with Dr. Torres who reports he will review chart and place orders.  Reports she will likely need dialysis during this admission but does not need it emergently.    Troponin elevated at 70 and 65, suspect this is related to her acute on chronic renal failure.    CT abdomen pelvis shows pulmonary edema, pleural effusions, and enteritis.    Upon reevaluation, she appeared stable actually appears well.  Medicine consulted.    CRITICAL CARE TIME   I personally saw the patient and independently provided 35 minutes of non-concurrent critical care out of the total shared critical care time provided. Critical care time was excluding separately reportable procedures. There was a high probability of clinically significant/life threatening deterioration in the patient's condition which required my urgent

## 2025-06-14 PROBLEM — K52.9 ENTERITIS: Status: ACTIVE | Noted: 2025-06-14

## 2025-06-14 PROBLEM — J81.0 ACUTE PULMONARY EDEMA (HCC): Status: ACTIVE | Noted: 2025-06-14

## 2025-06-14 PROBLEM — N18.4 CKD (CHRONIC KIDNEY DISEASE) STAGE 4, GFR 15-29 ML/MIN (HCC): Status: ACTIVE | Noted: 2025-06-14

## 2025-06-14 PROBLEM — N18.9 ACUTE RENAL FAILURE SUPERIMPOSED ON CHRONIC KIDNEY DISEASE: Status: ACTIVE | Noted: 2025-06-13

## 2025-06-14 LAB
ALBUMIN SERPL-MCNC: 3.4 G/DL (ref 3.4–5)
ANION GAP SERPL CALCULATED.3IONS-SCNC: 27 MMOL/L (ref 3–16)
BASOPHILS # BLD: 0 K/UL (ref 0–0.2)
BASOPHILS NFR BLD: 0.1 %
BLOOD BANK DISPENSE STATUS: NORMAL
BLOOD BANK PRODUCT CODE: NORMAL
BPU ID: NORMAL
BUN SERPL-MCNC: 125 MG/DL (ref 7–20)
CALCIUM SERPL-MCNC: 8.4 MG/DL (ref 8.3–10.6)
CHLORIDE SERPL-SCNC: 94 MMOL/L (ref 99–110)
CO2 SERPL-SCNC: 13 MMOL/L (ref 21–32)
CREAT SERPL-MCNC: 20.4 MG/DL (ref 0.6–1.1)
DEPRECATED RDW RBC AUTO: 16.2 % (ref 12.4–15.4)
DESCRIPTION BLOOD BANK: NORMAL
EKG ATRIAL RATE: 89 BPM
EKG DIAGNOSIS: NORMAL
EKG P AXIS: 11 DEGREES
EKG P-R INTERVAL: 166 MS
EKG Q-T INTERVAL: 398 MS
EKG QRS DURATION: 86 MS
EKG QTC CALCULATION (BAZETT): 484 MS
EKG R AXIS: -29 DEGREES
EKG T AXIS: 21 DEGREES
EKG VENTRICULAR RATE: 89 BPM
EOSINOPHIL # BLD: 0 K/UL (ref 0–0.6)
EOSINOPHIL NFR BLD: 0 %
GFR SERPLBLD CREATININE-BSD FMLA CKD-EPI: 2 ML/MIN/{1.73_M2}
GLUCOSE SERPL-MCNC: 235 MG/DL (ref 70–99)
HCT VFR BLD AUTO: 20.4 % (ref 36–48)
HCT VFR BLD AUTO: 20.8 % (ref 36–48)
HCT VFR BLD AUTO: 24 % (ref 36–48)
HGB BLD-MCNC: 6.8 G/DL (ref 12–16)
HGB BLD-MCNC: 6.9 G/DL (ref 12–16)
HGB BLD-MCNC: 7.9 G/DL (ref 12–16)
HIV 1+2 AB+HIV1 P24 AG SERPL QL IA: NORMAL
HIV 2 AB SERPL QL IA: NORMAL
HIV1 AB SERPL QL IA: NORMAL
HIV1 P24 AG SERPL QL IA: NORMAL
IRON SATN MFR SERPL: 6 % (ref 15–50)
IRON SERPL-MCNC: 16 UG/DL (ref 37–145)
KAPPA LC FREE SER-MCNC: 185 MG/L (ref 2.37–20.73)
KAPPA LC FREE/LAMBDA FREE SER: 0.51 {RATIO} (ref 0.22–1.74)
LAMBDA LC FREE SERPL-MCNC: 361 MG/L (ref 4.23–27.69)
LYMPHOCYTES # BLD: 0.3 K/UL (ref 1–5.1)
LYMPHOCYTES NFR BLD: 4.9 %
MCH RBC QN AUTO: 26.9 PG (ref 26–34)
MCHC RBC AUTO-ENTMCNC: 32.8 G/DL (ref 31–36)
MCV RBC AUTO: 82.1 FL (ref 80–100)
MONOCYTES # BLD: 0 K/UL (ref 0–1.3)
MONOCYTES NFR BLD: 0.7 %
NEUTROPHILS # BLD: 5.9 K/UL (ref 1.7–7.7)
NEUTROPHILS NFR BLD: 94.3 %
PHOSPHATE SERPL-MCNC: 12.3 MG/DL (ref 2.5–4.9)
PLATELET # BLD AUTO: 271 K/UL (ref 135–450)
PMV BLD AUTO: 9.9 FL (ref 5–10.5)
POTASSIUM SERPL-SCNC: 3.6 MMOL/L (ref 3.5–5.1)
RBC # BLD AUTO: 2.54 M/UL (ref 4–5.2)
RPT COMMENT: ABNORMAL
SODIUM SERPL-SCNC: 134 MMOL/L (ref 136–145)
TIBC SERPL-MCNC: 247 UG/DL (ref 260–445)
WBC # BLD AUTO: 6.2 K/UL (ref 4–11)

## 2025-06-14 PROCEDURE — 99223 1ST HOSP IP/OBS HIGH 75: CPT | Performed by: INTERNAL MEDICINE

## 2025-06-14 PROCEDURE — 2580000003 HC RX 258: Performed by: HOSPITALIST

## 2025-06-14 PROCEDURE — 80069 RENAL FUNCTION PANEL: CPT

## 2025-06-14 PROCEDURE — 2500000003 HC RX 250 WO HCPCS: Performed by: INTERNAL MEDICINE

## 2025-06-14 PROCEDURE — 83540 ASSAY OF IRON: CPT

## 2025-06-14 PROCEDURE — 2500000003 HC RX 250 WO HCPCS: Performed by: HOSPITALIST

## 2025-06-14 PROCEDURE — 6370000000 HC RX 637 (ALT 250 FOR IP): Performed by: HOSPITALIST

## 2025-06-14 PROCEDURE — 84155 ASSAY OF PROTEIN SERUM: CPT

## 2025-06-14 PROCEDURE — 85014 HEMATOCRIT: CPT

## 2025-06-14 PROCEDURE — 6360000002 HC RX W HCPCS: Performed by: INTERNAL MEDICINE

## 2025-06-14 PROCEDURE — 6370000000 HC RX 637 (ALT 250 FOR IP): Performed by: INTERNAL MEDICINE

## 2025-06-14 PROCEDURE — 6360000002 HC RX W HCPCS: Performed by: HOSPITALIST

## 2025-06-14 PROCEDURE — 93010 ELECTROCARDIOGRAM REPORT: CPT | Performed by: INTERNAL MEDICINE

## 2025-06-14 PROCEDURE — 94760 N-INVAS EAR/PLS OXIMETRY 1: CPT

## 2025-06-14 PROCEDURE — 85025 COMPLETE CBC W/AUTO DIFF WBC: CPT

## 2025-06-14 PROCEDURE — 84165 PROTEIN E-PHORESIS SERUM: CPT

## 2025-06-14 PROCEDURE — 36415 COLL VENOUS BLD VENIPUNCTURE: CPT

## 2025-06-14 PROCEDURE — 2060000000 HC ICU INTERMEDIATE R&B

## 2025-06-14 PROCEDURE — 85018 HEMOGLOBIN: CPT

## 2025-06-14 PROCEDURE — 2580000003 HC RX 258: Performed by: INTERNAL MEDICINE

## 2025-06-14 PROCEDURE — 36430 TRANSFUSION BLD/BLD COMPNT: CPT

## 2025-06-14 PROCEDURE — 83550 IRON BINDING TEST: CPT

## 2025-06-14 RX ORDER — SODIUM CHLORIDE 9 MG/ML
INJECTION, SOLUTION INTRAVENOUS PRN
Status: DISCONTINUED | OUTPATIENT
Start: 2025-06-14 | End: 2025-06-18

## 2025-06-14 RX ORDER — NIFEDIPINE 60 MG/1
60 TABLET, EXTENDED RELEASE ORAL DAILY
Status: DISCONTINUED | OUTPATIENT
Start: 2025-06-15 | End: 2025-06-19

## 2025-06-14 RX ORDER — CALCIUM ACETATE 667 MG/1
2 CAPSULE ORAL
Status: DISCONTINUED | OUTPATIENT
Start: 2025-06-14 | End: 2025-06-21 | Stop reason: HOSPADM

## 2025-06-14 RX ORDER — LABETALOL 200 MG/1
200 TABLET, FILM COATED ORAL 2 TIMES DAILY
Status: DISCONTINUED | OUTPATIENT
Start: 2025-06-14 | End: 2025-06-21 | Stop reason: HOSPADM

## 2025-06-14 RX ADMIN — ATORVASTATIN CALCIUM 40 MG: 40 TABLET, FILM COATED ORAL at 21:04

## 2025-06-14 RX ADMIN — HEPARIN SODIUM 5000 UNITS: 5000 INJECTION INTRAVENOUS; SUBCUTANEOUS at 06:11

## 2025-06-14 RX ADMIN — PIPERACILLIN AND TAZOBACTAM 3375 MG: 3; .375 INJECTION, POWDER, LYOPHILIZED, FOR SOLUTION INTRAVENOUS at 06:10

## 2025-06-14 RX ADMIN — PIPERACILLIN AND TAZOBACTAM 3375 MG: 3; .375 INJECTION, POWDER, LYOPHILIZED, FOR SOLUTION INTRAVENOUS at 18:23

## 2025-06-14 RX ADMIN — METHYLPREDNISOLONE SODIUM SUCCINATE 500 MG: 500 INJECTION INTRAMUSCULAR; INTRAVENOUS at 12:34

## 2025-06-14 RX ADMIN — LABETALOL HYDROCHLORIDE 300 MG: 200 TABLET, FILM COATED ORAL at 08:20

## 2025-06-14 RX ADMIN — IRON SUCROSE 200 MG: 20 INJECTION, SOLUTION INTRAVENOUS at 12:28

## 2025-06-14 RX ADMIN — SODIUM CHLORIDE, PRESERVATIVE FREE 10 ML: 5 INJECTION INTRAVENOUS at 21:04

## 2025-06-14 RX ADMIN — LABETALOL HYDROCHLORIDE 200 MG: 200 TABLET, FILM COATED ORAL at 21:04

## 2025-06-14 RX ADMIN — CALCIUM ACETATE 1334 MG: 667 CAPSULE ORAL at 12:28

## 2025-06-14 RX ADMIN — SODIUM BICARBONATE: 84 INJECTION, SOLUTION INTRAVENOUS at 09:04

## 2025-06-14 RX ADMIN — CALCIUM ACETATE 1334 MG: 667 CAPSULE ORAL at 18:21

## 2025-06-14 RX ADMIN — NIFEDIPINE 60 MG: 60 TABLET, EXTENDED RELEASE ORAL at 08:20

## 2025-06-14 ASSESSMENT — PAIN SCALES - GENERAL
PAINLEVEL_OUTOF10: 0
PAINLEVEL_OUTOF10: 0

## 2025-06-14 NOTE — PLAN OF CARE
Problem: Discharge Planning  Goal: Discharge to home or other facility with appropriate resources  Outcome: Progressing  Flowsheets (Taken 6/13/2025 2059)  Discharge to home or other facility with appropriate resources: Identify barriers to discharge with patient and caregiver     Problem: Pain  Goal: Verbalizes/displays adequate comfort level or baseline comfort level  Outcome: Progressing  Flowsheets  Taken 6/13/2025 2317  Verbalizes/displays adequate comfort level or baseline comfort level: Encourage patient to monitor pain and request assistance  Taken 6/13/2025 2023  Verbalizes/displays adequate comfort level or baseline comfort level: Encourage patient to monitor pain and request assistance     Problem: Safety - Adult  Goal: Free from fall injury  Outcome: Progressing     Problem: ABCDS Injury Assessment  Goal: Absence of physical injury  Outcome: Progressing     Problem: Metabolic/Fluid and Electrolytes - Adult  Goal: Electrolytes maintained within normal limits  Outcome: Progressing  Flowsheets (Taken 6/13/2025 2059)  Electrolytes maintained within normal limits: Monitor labs and assess patient for signs and symptoms of electrolyte imbalances     Problem: Metabolic/Fluid and Electrolytes - Adult  Goal: Hemodynamic stability and optimal renal function maintained  Outcome: Progressing  Flowsheets (Taken 6/13/2025 2059)  Hemodynamic stability and optimal renal function maintained: Monitor intake, output and patient weight     Problem: Hematologic - Adult  Goal: Maintains hematologic stability  Outcome: Progressing  Flowsheets (Taken 6/13/2025 2059)  Maintains hematologic stability: Administer blood products/factors as ordered

## 2025-06-14 NOTE — PROGRESS NOTES
4 Eyes Skin Assessment     NAME:  Erinn Kulkarni  YOB: 1980  MEDICAL RECORD NUMBER:  [id number]    The patient is being assessed for  Admission    I agree that at least one RN has performed a thorough Head to Toe Skin Assessment on the patient. ALL assessment sites listed below have been assessed.      Areas assessed by both nurses:    Head, Face, Ears, Shoulders, Back, Chest, Arms, Elbows, Hands, Sacrum. Buttock, Coccyx, Ischium, Legs. Feet and Heels, and Under Medical Devices         Does the Patient have a Wound? No noted wound(s)       Garcia Prevention initiated by RN: No  Wound Care Orders initiated by RN: No    Pressure Injury (Stage 3,4, Unstageable, DTI, NWPT, and Complex wounds) if present, place Wound referral order by RN under : No    New Ostomies, if present place, Ostomy referral order under : No     Nurse 1 eSignature: Electronically signed by Melania Park RN on 6/13/25 at 9:50 PM EDT    **SHARE this note so that the co-signing nurse can place an eSignature**    Nurse 2 eSignature: Electronically signed by Mona Johnston RN on 6/14/25 at 1:20 AM EDT

## 2025-06-14 NOTE — PROGRESS NOTES
Clinical Pharmacy Note  Renal Dose Adjustment    Erinn Kulkarni is receiving Zosyn.  This medication is renally eliminated.  Based on the patient's Estimated Creatinine Clearance: 3 mL/min (A) (based on SCr of 20.4 mg/dL (HH)). and urine output, the dose has been adjusted to Zosyn 3.375 g Q12H per protocol.    Pharmacy will continue to monitor and adjust dose as needed for changes in renal function.    Maritza Nielsen RPH, PharmD, 6/14/2025 7:57 AM      Maritza Nielsen RPH, PharmD, 6/14/2025 7:57 AM

## 2025-06-14 NOTE — ED NOTES
Report called to CONRADO Ricci. Medications sent to tube station [phone]. RN updated on medication delay due to medications coming from central pharmacy.

## 2025-06-14 NOTE — ED NOTES
Report received from CONRADO Moscoso. Patient resting comfortably in bed with no s/s of AD at this time. Bed locked and in lowest position with call light in reach. Placed on purewick for urine sample after receiving Lasix at 1800.

## 2025-06-14 NOTE — PLAN OF CARE
Problem: Discharge Planning  Goal: Discharge to home or other facility with appropriate resources  6/14/2025 0822 by Andrea Acosta RN  Outcome: Progressing  6/14/2025 0017 by Melania Park RN  Outcome: Progressing  Flowsheets (Taken 6/13/2025 2059)  Discharge to home or other facility with appropriate resources: Identify barriers to discharge with patient and caregiver     Problem: Pain  Goal: Verbalizes/displays adequate comfort level or baseline comfort level  6/14/2025 0822 by Andrea Acosta RN  Outcome: Progressing  6/14/2025 0017 by Melania Park RN  Outcome: Progressing  Flowsheets  Taken 6/13/2025 2317  Verbalizes/displays adequate comfort level or baseline comfort level: Encourage patient to monitor pain and request assistance  Taken 6/13/2025 2023  Verbalizes/displays adequate comfort level or baseline comfort level: Encourage patient to monitor pain and request assistance     Problem: Safety - Adult  Goal: Free from fall injury  6/14/2025 0822 by Andrea Acosta RN  Outcome: Progressing  6/14/2025 0017 by Melania Park RN  Outcome: Progressing     Problem: ABCDS Injury Assessment  Goal: Absence of physical injury  6/14/2025 0822 by Andrea Acosta RN  Outcome: Progressing  6/14/2025 0017 by Melania Park RN  Outcome: Progressing     Problem: Metabolic/Fluid and Electrolytes - Adult  Goal: Electrolytes maintained within normal limits  6/14/2025 0822 by Andrea Acosta RN  Outcome: Progressing  6/14/2025 0017 by Melania Park RN  Outcome: Progressing  Flowsheets (Taken 6/13/2025 2059)  Electrolytes maintained within normal limits: Monitor labs and assess patient for signs and symptoms of electrolyte imbalances  Goal: Hemodynamic stability and optimal renal function maintained  6/14/2025 0822 by Andrea Acosta RN  Outcome: Progressing  6/14/2025 0017 by Melania Park RN  Outcome: Progressing  Flowsheets (Taken 6/13/2025 2059)  Hemodynamic stability and optimal

## 2025-06-14 NOTE — ED NOTES
Maritza SILVERIO called RN to update that patient is able to received blood administration. MD Shah messaged on [employer] to put blood admin orders in.

## 2025-06-14 NOTE — PROGRESS NOTES
Patient admitted to room 5252 from ed. Patient oriented to room, call light, bed rails, phone, lights and bathroom. Patient instructed about the schedule of the day including: vital sign frequency, lab draws, possible tests, frequency of MD and staff rounds, daily weights, I &O's and prescribed diet.  Telemetry box in place, patient aware of placement and reason. Bed locked, in lowest position, side rails up 2/4, call light within reach.

## 2025-06-14 NOTE — PROGRESS NOTES
Assisted pt to the bathroom to urinate. Urine sample collected, noted it has blood, she is on her period per pt.

## 2025-06-14 NOTE — ACP (ADVANCE CARE PLANNING)
Advanced Care Planning Note.    Purpose of Encounter: Advanced care planning in light of acute and chronic deteriorating medical conditions.    Parties In Attendance: Patient (Erinn Kulkarni),  Rogelio Shah MD  (myself)    Decisional Capacity: Yes    Subjective: Patient/family understand in this voluntary conversation that Erinn Kulkarni continues to deteriorate and discuss what inteventions and plans patient would want implemented in light of the following diagnoses:      TELLY  Anemia    Objective: Pt has been having chronic decline in functional status with increased dependence on others for ADLs  Increased frequency of Hospitalizations.  Likelihood of further hospitalizations with likelihood of escalation of medical interventions with the expectation of returning to a diminishing baseline level of functionality.      Discussion highlights: I discussed with patient the ramifications of their acute and chronic medical problems- and the likely outcomes expected, both in terms of statistics, and as part of my experience seeing patients with similar conditions.      Goals of Care Determination:  Patient wishes to remain Full code for now, but has interest in continuing this conversation, and discussing with family before making any changes to code status and goals of care parameters.      Plan: Continue to educate patient on medical conditions and the choices available regarding possible outcomes with regard to goals of care and code status.         Time spent on Advanced care Plannin minutes    Rogelio Shah MD  2025 9:11 PM

## 2025-06-15 LAB
BACTERIA UR CULT: NORMAL
BASOPHILS # BLD: 0 K/UL (ref 0–0.2)
BASOPHILS NFR BLD: 0.1 %
DEPRECATED RDW RBC AUTO: 16.7 % (ref 12.4–15.4)
EOSINOPHIL # BLD: 0 K/UL (ref 0–0.6)
EOSINOPHIL NFR BLD: 0 %
HCT VFR BLD AUTO: 20.7 % (ref 36–48)
HCT VFR BLD AUTO: 21.4 % (ref 36–48)
HGB BLD-MCNC: 7 G/DL (ref 12–16)
HGB BLD-MCNC: 7.2 G/DL (ref 12–16)
LYMPHOCYTES # BLD: 0.4 K/UL (ref 1–5.1)
LYMPHOCYTES NFR BLD: 4.2 %
MCH RBC QN AUTO: 27.3 PG (ref 26–34)
MCHC RBC AUTO-ENTMCNC: 34.1 G/DL (ref 31–36)
MCV RBC AUTO: 80 FL (ref 80–100)
MONOCYTES # BLD: 0.3 K/UL (ref 0–1.3)
MONOCYTES NFR BLD: 3.3 %
NEUTROPHILS # BLD: 9.7 K/UL (ref 1.7–7.7)
NEUTROPHILS NFR BLD: 92.4 %
PLATELET # BLD AUTO: 235 K/UL (ref 135–450)
PMV BLD AUTO: 9.7 FL (ref 5–10.5)
RBC # BLD AUTO: 2.58 M/UL (ref 4–5.2)
WBC # BLD AUTO: 10.5 K/UL (ref 4–11)

## 2025-06-15 PROCEDURE — 94760 N-INVAS EAR/PLS OXIMETRY 1: CPT

## 2025-06-15 PROCEDURE — 2580000003 HC RX 258: Performed by: INTERNAL MEDICINE

## 2025-06-15 PROCEDURE — 2060000000 HC ICU INTERMEDIATE R&B

## 2025-06-15 PROCEDURE — 2500000003 HC RX 250 WO HCPCS: Performed by: HOSPITALIST

## 2025-06-15 PROCEDURE — 2580000003 HC RX 258: Performed by: HOSPITALIST

## 2025-06-15 PROCEDURE — 6370000000 HC RX 637 (ALT 250 FOR IP): Performed by: INTERNAL MEDICINE

## 2025-06-15 PROCEDURE — 6370000000 HC RX 637 (ALT 250 FOR IP): Performed by: HOSPITALIST

## 2025-06-15 PROCEDURE — 36415 COLL VENOUS BLD VENIPUNCTURE: CPT

## 2025-06-15 PROCEDURE — 85025 COMPLETE CBC W/AUTO DIFF WBC: CPT

## 2025-06-15 PROCEDURE — 85018 HEMOGLOBIN: CPT

## 2025-06-15 PROCEDURE — 2500000003 HC RX 250 WO HCPCS: Performed by: INTERNAL MEDICINE

## 2025-06-15 PROCEDURE — 6360000002 HC RX W HCPCS: Performed by: INTERNAL MEDICINE

## 2025-06-15 PROCEDURE — 85014 HEMATOCRIT: CPT

## 2025-06-15 PROCEDURE — 6360000002 HC RX W HCPCS: Performed by: HOSPITALIST

## 2025-06-15 PROCEDURE — 99233 SBSQ HOSP IP/OBS HIGH 50: CPT | Performed by: HOSPITALIST

## 2025-06-15 RX ADMIN — LABETALOL HYDROCHLORIDE 200 MG: 200 TABLET, FILM COATED ORAL at 07:46

## 2025-06-15 RX ADMIN — CALCIUM ACETATE 1334 MG: 667 CAPSULE ORAL at 07:46

## 2025-06-15 RX ADMIN — SODIUM BICARBONATE: 84 INJECTION, SOLUTION INTRAVENOUS at 02:22

## 2025-06-15 RX ADMIN — PIPERACILLIN AND TAZOBACTAM 3375 MG: 3; .375 INJECTION, POWDER, LYOPHILIZED, FOR SOLUTION INTRAVENOUS at 17:35

## 2025-06-15 RX ADMIN — CALCIUM ACETATE 1334 MG: 667 CAPSULE ORAL at 11:06

## 2025-06-15 RX ADMIN — IRON SUCROSE 200 MG: 20 INJECTION, SOLUTION INTRAVENOUS at 11:06

## 2025-06-15 RX ADMIN — NIFEDIPINE 60 MG: 60 TABLET, FILM COATED, EXTENDED RELEASE ORAL at 07:46

## 2025-06-15 RX ADMIN — SODIUM BICARBONATE: 84 INJECTION, SOLUTION INTRAVENOUS at 17:33

## 2025-06-15 RX ADMIN — SODIUM CHLORIDE, PRESERVATIVE FREE 10 ML: 5 INJECTION INTRAVENOUS at 20:53

## 2025-06-15 RX ADMIN — ATORVASTATIN CALCIUM 40 MG: 40 TABLET, FILM COATED ORAL at 20:51

## 2025-06-15 RX ADMIN — PIPERACILLIN AND TAZOBACTAM 3375 MG: 3; .375 INJECTION, POWDER, LYOPHILIZED, FOR SOLUTION INTRAVENOUS at 05:31

## 2025-06-15 RX ADMIN — CALCIUM ACETATE 1334 MG: 667 CAPSULE ORAL at 17:36

## 2025-06-15 RX ADMIN — LABETALOL HYDROCHLORIDE 200 MG: 200 TABLET, FILM COATED ORAL at 20:51

## 2025-06-15 RX ADMIN — METHYLPREDNISOLONE SODIUM SUCCINATE 500 MG: 500 INJECTION INTRAMUSCULAR; INTRAVENOUS at 11:37

## 2025-06-15 NOTE — PROGRESS NOTES
This RN, during assessment, noticed patients RUE was slightly swollen. Patient did states some numbness overnight that is no longer present. Patient and sister at bedside stated the swelling started in the ED. Patient does have x2 PIV in RUE - both were infusing properly, was flushed by this RN without pain, has brisk blood return. MD Lopez was notified.    Electronically signed by Keshav Robles RN on 6/15/2025 at 10:00 AM

## 2025-06-15 NOTE — PLAN OF CARE
Problem: Discharge Planning  Goal: Discharge to home or other facility with appropriate resources  Outcome: Progressing  Flowsheets (Taken 6/14/2025 2050)  Discharge to home or other facility with appropriate resources: Identify barriers to discharge with patient and caregiver     Problem: Pain  Goal: Verbalizes/displays adequate comfort level or baseline comfort level  Outcome: Progressing     Problem: Safety - Adult  Goal: Free from fall injury  Outcome: Progressing     Problem: ABCDS Injury Assessment  Goal: Absence of physical injury  Outcome: Progressing     Problem: Metabolic/Fluid and Electrolytes - Adult  Goal: Electrolytes maintained within normal limits  Outcome: Progressing  Flowsheets (Taken 6/14/2025 2050)  Electrolytes maintained within normal limits: Monitor labs and assess patient for signs and symptoms of electrolyte imbalances     Problem: Metabolic/Fluid and Electrolytes - Adult  Goal: Hemodynamic stability and optimal renal function maintained  Outcome: Progressing  Flowsheets (Taken 6/14/2025 2050)  Hemodynamic stability and optimal renal function maintained: Monitor labs and assess for signs and symptoms of volume excess or deficit     Problem: Hematologic - Adult  Goal: Maintains hematologic stability  Outcome: Progressing  Flowsheets (Taken 6/14/2025 2050)  Maintains hematologic stability: Assess for signs and symptoms of bleeding or hemorrhage

## 2025-06-15 NOTE — PLAN OF CARE
Problem: Discharge Planning  Goal: Discharge to home or other facility with appropriate resources  6/15/2025 0821 by Keshav Robles RN  Outcome: Progressing     Problem: Pain  Goal: Verbalizes/displays adequate comfort level or baseline comfort level  6/15/2025 0821 by Keshav Robles RN  Outcome: Progressing     Problem: Safety - Adult  Goal: Free from fall injury  6/15/2025 0821 by Keshav Robles RN  Outcome: Progressing     Problem: ABCDS Injury Assessment  Goal: Absence of physical injury  6/15/2025 0821 by Keshav Robles RN  Outcome: Progressing     Problem: Metabolic/Fluid and Electrolytes - Adult  Goal: Electrolytes maintained within normal limits  6/15/2025 0821 by Keshav Robles RN  Outcome: Progressing     Problem: Metabolic/Fluid and Electrolytes - Adult  Goal: Hemodynamic stability and optimal renal function maintained  6/15/2025 0821 by Keshav Robles RN  Outcome: Progressing     Problem: Hematologic - Adult  Goal: Maintains hematologic stability  6/15/2025 0821 by Keshav Robles RN  Outcome: Progressing

## 2025-06-15 NOTE — PROGRESS NOTES
V2.0    [facility] Progress Note      Name:  Erinn Kulkarni /Age/Sex: 1980  (44 y.o. female)   MRN & CSN:  [id number] & [id number] Encounter Date/Time: 6/15/2025 4:51 PM EDT   Location:  [id number] PCP: Diego Licona MD     Attending:Lindsay Lopez MD       Hospital Day: 3    Assessment and Recommendations   Erinn Kulkrani is a 44 y.o. female who presents with Acute renal failure superimposed on chronic kidney disease      Plan:     ARF on CKD III  - creat elevated on admit  - nephrology consulted  - avoid nephrotoxic meds  - plan for TDC placement and poss RRT per nephro on Monday  - plan renal biopsy per nephro on tuesday     Anemia  - hb <7, received 1 unit blood transfusion  - recheck hb post transfusion 7.9  - iron studies with iron def, started on IV iron  - hb dropped to 7.0, recheck hb at 3pm and transfuse if <7  - suspect 2/2 dilution/iron def/CKD - less likely bleeding    Diarrhea - resolved     Poss UTI- UA abnormal, cont IV abx, await urine cx - neg      Diet ADULT DIET; Regular; Low Sodium (2 gm); Low Potassium (Less than 3000 mg/day); Low Phosphorus (Less than 1000 mg)   DVT Prophylaxis [] Lovenox, []  Heparin, [x] SCDs, [] Ambulation,  [] Eliquis, [] Xarelto  [] Coumadin   Code Status Full Code             Personally reviewed Lab Studies and Imaging       Subjective:     Hb dropped this am, pt denies blood in stool      Review of Systems:      Pertinent positives and negatives discussed in HPI    Objective:     Intake/Output Summary (Last 24 hours) at 6/15/2025 1651  Last data filed at 6/15/2025 1310  Gross per 24 hour   Intake 3562.35 ml   Output 300 ml   Net 3262.35 ml      Vitals:   Vitals:    06/15/25 0741 06/15/25 0744 06/15/25 1130 06/15/25 1521   BP: (!) 142/90  133/87 136/81   Pulse: 96 93 84 81   Resp:  16  18   Temp: 98.4 °F (36.9 °C)  97.9 °F (36.6 °C) 98 °F (36.7 °C)   TempSrc: Oral  Oral Oral   SpO2: 95% 94% 95% 95%   Weight:       Height:

## 2025-06-15 NOTE — PROGRESS NOTES
HD Cath placement tomorrow   Start HD tomorrow   Solumedrol third dose today   Renal biopsy Tuesday as pt is uremic and high risk to bleed     # TELLY on CKD 3/4     # HTN     # Anemia     # Acid- base/ Electrolyte imbalance      # MBD      Plan   - No obstruction  - Ur studies - Hematuria and proteinuria   - r/o GN  - IV solumedrol   - serology pending  - renal biopsy   - Bicarb gtt   - monitor vol status   - will need to start RRT       Bruno Villar MD

## 2025-06-16 ENCOUNTER — APPOINTMENT (OUTPATIENT)
Dept: INTERVENTIONAL RADIOLOGY/VASCULAR | Age: 45
DRG: 673 | End: 2025-06-16
Attending: INTERNAL MEDICINE
Payer: MEDICARE

## 2025-06-16 ENCOUNTER — CARE COORDINATION (OUTPATIENT)
Dept: CASE MANAGEMENT | Age: 45
End: 2025-06-16

## 2025-06-16 LAB
ALBUMIN SERPL ELPH-MCNC: 2.7 G/DL (ref 3.1–4.9)
ALBUMIN SERPL-MCNC: 3.2 G/DL (ref 3.4–5)
ALPHA1 GLOB SERPL ELPH-MCNC: 0.4 G/DL (ref 0.2–0.4)
ALPHA2 GLOB SERPL ELPH-MCNC: 1 G/DL (ref 0.4–1.1)
ANION GAP SERPL CALCULATED.3IONS-SCNC: 26 MMOL/L (ref 3–16)
B-GLOBULIN SERPL ELPH-MCNC: 1 G/DL (ref 0.9–1.6)
BASOPHILS # BLD: 0 K/UL (ref 0–0.2)
BASOPHILS NFR BLD: 0.1 %
BLOOD BANK DISPENSE STATUS: NORMAL
BLOOD BANK DISPENSE STATUS: NORMAL
BLOOD BANK PRODUCT CODE: NORMAL
BLOOD BANK PRODUCT CODE: NORMAL
BM IGG SER IF-ACNC: 0 AU/ML (ref 0–19)
BPU ID: NORMAL
BPU ID: NORMAL
BUN SERPL-MCNC: 127 MG/DL (ref 7–20)
CALCIUM SERPL-MCNC: 8 MG/DL (ref 8.3–10.6)
CHLORIDE SERPL-SCNC: 80 MMOL/L (ref 99–110)
CO2 SERPL-SCNC: 20 MMOL/L (ref 21–32)
CREAT SERPL-MCNC: 19.4 MG/DL (ref 0.6–1.1)
DEPRECATED RDW RBC AUTO: 17.1 % (ref 12.4–15.4)
DESCRIPTION BLOOD BANK: NORMAL
DESCRIPTION BLOOD BANK: NORMAL
EOSINOPHIL # BLD: 0 K/UL (ref 0–0.6)
EOSINOPHIL NFR BLD: 0 %
EST. AVERAGE GLUCOSE BLD GHB EST-MCNC: 122.6 MG/DL
GAMMA GLOB SERPL ELPH-MCNC: 1.2 G/DL (ref 0.6–1.8)
GFR SERPLBLD CREATININE-BSD FMLA CKD-EPI: 2 ML/MIN/{1.73_M2}
GI PATHOGENS PNL STL NAA+PROBE: NORMAL
GLUCOSE BLD-MCNC: 189 MG/DL (ref 70–99)
GLUCOSE BLD-MCNC: 227 MG/DL (ref 70–99)
GLUCOSE SERPL-MCNC: 304 MG/DL (ref 70–99)
HBA1C MFR BLD: 5.9 %
HCT VFR BLD AUTO: 19.9 % (ref 36–48)
HCT VFR BLD AUTO: 25.6 % (ref 36–48)
HGB BLD-MCNC: 6.7 G/DL (ref 12–16)
HGB BLD-MCNC: 8.5 G/DL (ref 12–16)
INR PPP: 1.21 (ref 0.86–1.14)
LYMPHOCYTES # BLD: 0.5 K/UL (ref 1–5.1)
LYMPHOCYTES NFR BLD: 3.9 %
MCH RBC QN AUTO: 26.9 PG (ref 26–34)
MCHC RBC AUTO-ENTMCNC: 33.6 G/DL (ref 31–36)
MCV RBC AUTO: 80 FL (ref 80–100)
MONOCYTES # BLD: 0.4 K/UL (ref 0–1.3)
MONOCYTES NFR BLD: 3.5 %
MYELOPEROXIDASE AB SER-ACNC: 0 AU/ML (ref 0–19)
NEUTROPHILS # BLD: 11.7 K/UL (ref 1.7–7.7)
NEUTROPHILS NFR BLD: 92.5 %
PERFORMED ON: ABNORMAL
PERFORMED ON: ABNORMAL
PHOSPHATE SERPL-MCNC: 11.3 MG/DL (ref 2.5–4.9)
PLATELET # BLD AUTO: 245 K/UL (ref 135–450)
PMV BLD AUTO: 10 FL (ref 5–10.5)
POTASSIUM SERPL-SCNC: 3.6 MMOL/L (ref 3.5–5.1)
PROT SERPL-MCNC: 6.3 G/DL (ref 6.4–8.2)
PROTEINASE3 AB SER-ACNC: 0 AU/ML (ref 0–19)
PROTHROMBIN TIME: 15.6 SEC (ref 12.1–14.9)
RBC # BLD AUTO: 2.49 M/UL (ref 4–5.2)
SODIUM SERPL-SCNC: 126 MMOL/L (ref 136–145)
SPE/IFE INTERPRETATION: NORMAL
WBC # BLD AUTO: 12.7 K/UL (ref 4–11)

## 2025-06-16 PROCEDURE — 80069 RENAL FUNCTION PANEL: CPT

## 2025-06-16 PROCEDURE — 02H633Z INSERTION OF INFUSION DEVICE INTO RIGHT ATRIUM, PERCUTANEOUS APPROACH: ICD-10-PCS | Performed by: RADIOLOGY

## 2025-06-16 PROCEDURE — 85025 COMPLETE CBC W/AUTO DIFF WBC: CPT

## 2025-06-16 PROCEDURE — 85610 PROTHROMBIN TIME: CPT

## 2025-06-16 PROCEDURE — 6360000002 HC RX W HCPCS: Performed by: INTERNAL MEDICINE

## 2025-06-16 PROCEDURE — 6360000002 HC RX W HCPCS: Performed by: HOSPITALIST

## 2025-06-16 PROCEDURE — 6370000000 HC RX 637 (ALT 250 FOR IP): Performed by: HOSPITALIST

## 2025-06-16 PROCEDURE — 36415 COLL VENOUS BLD VENIPUNCTURE: CPT

## 2025-06-16 PROCEDURE — 36558 INSERT TUNNELED CV CATH: CPT

## 2025-06-16 PROCEDURE — 77001 FLUOROGUIDE FOR VEIN DEVICE: CPT

## 2025-06-16 PROCEDURE — 83036 HEMOGLOBIN GLYCOSYLATED A1C: CPT

## 2025-06-16 PROCEDURE — 5A1D70Z PERFORMANCE OF URINARY FILTRATION, INTERMITTENT, LESS THAN 6 HOURS PER DAY: ICD-10-PCS | Performed by: INTERNAL MEDICINE

## 2025-06-16 PROCEDURE — 6370000000 HC RX 637 (ALT 250 FOR IP): Performed by: INTERNAL MEDICINE

## 2025-06-16 PROCEDURE — 6360000002 HC RX W HCPCS: Performed by: RADIOLOGY

## 2025-06-16 PROCEDURE — 85014 HEMATOCRIT: CPT

## 2025-06-16 PROCEDURE — 85018 HEMOGLOBIN: CPT

## 2025-06-16 PROCEDURE — 99152 MOD SED SAME PHYS/QHP 5/>YRS: CPT

## 2025-06-16 PROCEDURE — 2500000003 HC RX 250 WO HCPCS: Performed by: HOSPITALIST

## 2025-06-16 PROCEDURE — 2060000000 HC ICU INTERMEDIATE R&B

## 2025-06-16 PROCEDURE — 36430 TRANSFUSION BLD/BLD COMPNT: CPT

## 2025-06-16 PROCEDURE — 87506 IADNA-DNA/RNA PROBE TQ 6-11: CPT

## 2025-06-16 PROCEDURE — 2500000003 HC RX 250 WO HCPCS: Performed by: INTERNAL MEDICINE

## 2025-06-16 PROCEDURE — 2580000003 HC RX 258: Performed by: HOSPITALIST

## 2025-06-16 PROCEDURE — 90935 HEMODIALYSIS ONE EVALUATION: CPT

## 2025-06-16 PROCEDURE — 99233 SBSQ HOSP IP/OBS HIGH 50: CPT | Performed by: INTERNAL MEDICINE

## 2025-06-16 PROCEDURE — 76937 US GUIDE VASCULAR ACCESS: CPT

## 2025-06-16 PROCEDURE — 36556 INSERT NON-TUNNEL CV CATH: CPT

## 2025-06-16 PROCEDURE — 0JH63XZ INSERTION OF TUNNELED VASCULAR ACCESS DEVICE INTO CHEST SUBCUTANEOUS TISSUE AND FASCIA, PERCUTANEOUS APPROACH: ICD-10-PCS | Performed by: RADIOLOGY

## 2025-06-16 PROCEDURE — 2709999900 IR TUNNELED CVC PLACE WO SQ PORT/PUMP > 5 YEARS

## 2025-06-16 RX ORDER — INSULIN LISPRO 100 [IU]/ML
0-8 INJECTION, SOLUTION INTRAVENOUS; SUBCUTANEOUS EVERY 6 HOURS SCHEDULED
Status: DISCONTINUED | OUTPATIENT
Start: 2025-06-16 | End: 2025-06-17

## 2025-06-16 RX ORDER — GLUCAGON 1 MG/ML
1 KIT INJECTION PRN
Status: DISCONTINUED | OUTPATIENT
Start: 2025-06-16 | End: 2025-06-21 | Stop reason: HOSPADM

## 2025-06-16 RX ORDER — FENTANYL CITRATE 50 UG/ML
INJECTION, SOLUTION INTRAMUSCULAR; INTRAVENOUS PRN
Status: COMPLETED | OUTPATIENT
Start: 2025-06-16 | End: 2025-06-16

## 2025-06-16 RX ORDER — ALPRAZOLAM 0.25 MG
0.25 TABLET ORAL 2 TIMES DAILY PRN
Status: DISCONTINUED | OUTPATIENT
Start: 2025-06-16 | End: 2025-06-21 | Stop reason: HOSPADM

## 2025-06-16 RX ORDER — DEXTROSE MONOHYDRATE 100 MG/ML
INJECTION, SOLUTION INTRAVENOUS CONTINUOUS PRN
Status: DISCONTINUED | OUTPATIENT
Start: 2025-06-16 | End: 2025-06-21 | Stop reason: HOSPADM

## 2025-06-16 RX ORDER — INSULIN LISPRO 100 [IU]/ML
5 INJECTION, SOLUTION INTRAVENOUS; SUBCUTANEOUS
Status: DISCONTINUED | OUTPATIENT
Start: 2025-06-16 | End: 2025-06-18

## 2025-06-16 RX ORDER — MIDAZOLAM HYDROCHLORIDE 1 MG/ML
INJECTION, SOLUTION INTRAMUSCULAR; INTRAVENOUS PRN
Status: COMPLETED | OUTPATIENT
Start: 2025-06-16 | End: 2025-06-16

## 2025-06-16 RX ADMIN — ATORVASTATIN CALCIUM 40 MG: 40 TABLET, FILM COATED ORAL at 21:15

## 2025-06-16 RX ADMIN — LABETALOL HYDROCHLORIDE 200 MG: 200 TABLET, FILM COATED ORAL at 21:15

## 2025-06-16 RX ADMIN — PIPERACILLIN AND TAZOBACTAM 3375 MG: 3; .375 INJECTION, POWDER, LYOPHILIZED, FOR SOLUTION INTRAVENOUS at 19:15

## 2025-06-16 RX ADMIN — INSULIN LISPRO 2 UNITS: 100 INJECTION, SOLUTION INTRAVENOUS; SUBCUTANEOUS at 19:10

## 2025-06-16 RX ADMIN — NIFEDIPINE 60 MG: 60 TABLET, FILM COATED, EXTENDED RELEASE ORAL at 09:03

## 2025-06-16 RX ADMIN — SODIUM BICARBONATE: 84 INJECTION, SOLUTION INTRAVENOUS at 07:55

## 2025-06-16 RX ADMIN — CALCIUM ACETATE 1334 MG: 667 CAPSULE ORAL at 09:03

## 2025-06-16 RX ADMIN — MIDAZOLAM 2 MG: 1 INJECTION INTRAMUSCULAR; INTRAVENOUS at 14:07

## 2025-06-16 RX ADMIN — SODIUM CHLORIDE, PRESERVATIVE FREE 5 ML: 5 INJECTION INTRAVENOUS at 21:15

## 2025-06-16 RX ADMIN — LABETALOL HYDROCHLORIDE 200 MG: 200 TABLET, FILM COATED ORAL at 09:03

## 2025-06-16 RX ADMIN — MIDAZOLAM 2 MG: 1 INJECTION INTRAMUSCULAR; INTRAVENOUS at 13:58

## 2025-06-16 RX ADMIN — IRON SUCROSE 200 MG: 20 INJECTION, SOLUTION INTRAVENOUS at 10:33

## 2025-06-16 RX ADMIN — ACETAMINOPHEN 650 MG: 325 TABLET ORAL at 17:52

## 2025-06-16 RX ADMIN — FENTANYL CITRATE 100 MCG: 50 INJECTION INTRAMUSCULAR; INTRAVENOUS at 13:58

## 2025-06-16 RX ADMIN — EPOETIN ALFA-EPBX 10000 UNITS: 10000 INJECTION, SOLUTION INTRAVENOUS; SUBCUTANEOUS at 16:05

## 2025-06-16 RX ADMIN — PIPERACILLIN AND TAZOBACTAM 3375 MG: 3; .375 INJECTION, POWDER, LYOPHILIZED, FOR SOLUTION INTRAVENOUS at 05:25

## 2025-06-16 RX ADMIN — CALCIUM ACETATE 1334 MG: 667 CAPSULE ORAL at 17:53

## 2025-06-16 RX ADMIN — INSULIN LISPRO 5 UNITS: 100 INJECTION, SOLUTION INTRAVENOUS; SUBCUTANEOUS at 19:10

## 2025-06-16 ASSESSMENT — PAIN DESCRIPTION - LOCATION: LOCATION: CHEST

## 2025-06-16 ASSESSMENT — PAIN DESCRIPTION - DESCRIPTORS: DESCRIPTORS: ACHING

## 2025-06-16 ASSESSMENT — PAIN SCALES - GENERAL: PAINLEVEL_OUTOF10: 0

## 2025-06-16 ASSESSMENT — PAIN DESCRIPTION - ORIENTATION: ORIENTATION: RIGHT

## 2025-06-16 NOTE — PROGRESS NOTES
Office : [phone]     Fax :[phone]         Renal Progress Note  Subjective:   Admit Date: 6/13/2025     HPI      Interval History:     Patient not in acute distress  Denies chest pain or sob   Family at bed side        DIET ADULT DIET; Regular; Low Sodium (2 gm); Low Potassium (Less than 3000 mg/day); Low Phosphorus (Less than 1000 mg)  Medications:   Scheduled Meds:   piperacillin-tazobactam  3,375 mg IntraVENous Q12H    labetalol  200 mg Oral BID    NIFEdipine  60 mg Oral Daily    iron sucrose  200 mg IntraVENous Q24H    calcium acetate  2 capsule Oral TID WC    epoetin monica-epbx  3,000 Units SubCUTAneous Once per day on Tuesday Thursday Saturday    atorvastatin  40 mg Oral Nightly    [START ON 6/20/2025] vitamin D  50,000 Units Oral Weekly    sodium chloride flush  5-40 mL IntraVENous 2 times per day     Continuous Infusions:   sodium chloride      sodium bicarbonate 150 mEq in dextrose 5 % 1,000 mL infusion 75 mL/hr at 06/15/25 1733    sodium chloride      sodium chloride      sodium chloride         Labs:  CBC:   Recent Labs     06/13/25  1527 06/13/25  1737 06/14/25  0546 06/14/25  1759 06/15/25  0427 06/15/25  1427   WBC 9.2  --  6.2  --  10.5  --    HGB 6.2*   < > 6.8* 7.9* 7.0* 7.2*     --  271  --  235  --     < > = values in this interval not displayed.     BMP:    Recent Labs     06/13/25  1527 06/14/25  0546    134*   K 3.7 3.6   CL 96* 94*   CO2 12* 13*   * 125*   CREATININE 20.5* 20.4*   GLUCOSE 104* 235*     Ca/Mg/Phos:   Recent Labs     06/13/25  1527 06/14/25  0546   CALCIUM 8.9 8.4   PHOS  --  12.3*     Hepatic:

## 2025-06-16 NOTE — ACP (ADVANCE CARE PLANNING)
Advance Care Planning   Healthcare Decision Maker:    Primary Decision Maker: RAADMARJAN - Parent - [phone]    Secondary Decision Maker: Margo Kulkarniie - Brother/Sister - [phone]    Today we documented Decision Maker(s) consistent with Legal Next of Kin hierarchy.     #[phone]  Electronically signed by Tameka Garrett RN on 6/16/2025 at 3:05 PM

## 2025-06-16 NOTE — CONSULTS
Comprehensive Nutrition Assessment    Type and Reason for Visit:  Positive nutrition screen    Nutrition Recommendations/Plan:   Continue NPO status until medically appropriate to advance diet   Encourage po intake once diet advances   Provide diet education on diet for HD once medically appropriate   Monitor po intake and fluid status   Monitor potassium and phosphorus levels  Monitor need for diet modification r/t Na and glucose levels      Malnutrition Assessment:  Malnutrition Status:  Insufficient data (06/16/25 1183)    Context:  Acute Illness     Findings of the 6 clinical characteristics of malnutrition:  Energy Intake:  No decrease in energy intake  Weight Loss:  No weight loss     Body Fat Loss:  Unable to assess     Muscle Mass Loss:  Unable to assess    Fluid Accumulation:  Mild Extremities (Trace non pitting)   Strength:  Not Performed    Nutrition Assessment:    Triggers for nutritional risk with MST score of 2 for unintended weight loss and diet education. Patient presents to ED for complaints of abdominal pain, weight loss and diarrhea. Admitted for acute renal failure on CDK. PMH of CKD stage 4. Patient out of room d/t HD procedure. Per chart patient has good appetite with % intake of meals. Labs from 6/16 reviewed. Last BM 6/15. Will reasess when medically appropriate and give diet education. Will continue to monitor.    Nutrition Related Findings:    Labs 6/16: Na 126(L), glucose 304(H), (H), creatinine 19.4(H). Wound Type: None       Current Nutrition Intake & Therapies:    Average Meal Intake: NPO, %  Average Supplements Intake: None Ordered  Diet NPO    Anthropometric Measures:  Height: 160 cm (5' 2.99\")  Ideal Body Weight (IBW): 115 lbs (52 kg)    Admission Body Weight: 73.5 kg (162 lb 0.6 oz)  Current Body Weight: 82.8 kg (182 lb 8.7 oz), 158.7 % IBW. Weight Source: Standing scale  Current BMI (kg/m2): 32.3           Weight Adjustment For: No Adjustment

## 2025-06-16 NOTE — CARE COORDINATION
Case Management Assessment  Initial Evaluation    Date/Time of Evaluation: 6/16/2025 3:10 PM  Assessment Completed by: Tameka Garrett RN    If patient is discharged prior to next notation, then this note serves as note for discharge by case management.    Patient Name: Erinn Shankar                   YOB: 1980  Diagnosis: Enteritis [K52.9]  Acute pulmonary edema (HCC) [J81.0]  Acute renal failure (ARF) [N17.9]  Acute renal failure superimposed on chronic kidney disease, unspecified acute renal failure type, unspecified CKD stage [N17.9, N18.9]                   Date / Time: 6/13/2025  2:11 PM    Patient Admission Status: Inpatient   Readmission Risk (Low < 19, Mod (19-27), High > 27): Readmission Risk Score: 23.3    Current PCP: Diego Licona MD  PCP verified by CM? Yes    Chart Reviewed: Yes      History Provided by: Medical Record, Physician  Patient Orientation: Alert and Oriented    Patient Cognition: Alert    Hospitalization in the last 30 days (Readmission):  No    If yes, Readmission Assessment in  Navigator will be completed.    Advance Directives:      Code Status: Full Code   Patient's Primary Decision Maker is: Legal Next of Kin    Primary Decision Maker: MARJAN SHANKAR - Parent - [phone]    Secondary Decision Maker: Christa Shankar - Brother/Sister - [phone]    Discharge Planning:    Patient lives with: Family Members Type of Home: House  Primary Care Giver: Self  Patient Support Systems include: Parent, Family Members   Current Financial resources: Medicare  Current community resources: None  Current services prior to admission: None            Current DME:  None            Type of Home Care services:  None    ADLS  Prior functional level: Independent in ADLs/IADLs  Current functional level: Independent in ADLs/IADLs    No current PT/OT orders    Family can provide assistance at DC: Yes  Would you like Case Management to discuss the discharge plan with any  other family members/significant others, and if so, who? No  Plans to Return to Present Housing: Yes  Other Identified Issues/Barriers to RETURNING to current housing: new HD  Potential Assistance needed at discharge: Other (Comment) (new HD)            Potential DME:  none  Patient expects to discharge to: House  Plan for transportation at discharge: Family    Financial    Payor: [employer]ICARE / Plan: [employer] MEDICARE ADVANTAGE HMO / Product Type: Medicare /     Does insurance require precert for SNF: Yes    Potential assistance Purchasing Medications: No  Meds-to-Beds request: Yes      [facility] [id number] - [place], [place] - [address] - P [phone] - F [phone]  [address] [address]  Phone: [phone] Fax: [phone]      Notes:    Factors facilitating achievement of predicted outcomes: Family support, Cooperative, and Pleasant    Barriers to discharge: Decreased endurance and Long standing deficits    Additional Case Management Notes:   DC plan to return home with family.  Currently getting a TDC placed.  Call placed to Dr Cantor, he will speak with patient and asked CM to call him back tomorrow to initiate placement.  All labs are ordered.  Will likely initiate [facility] portal in the AM. Patient is an RPM candidate will speak to patient at bedside tomorrow.    The Plan for Transition of Care is related to the following treatment goals of Enteritis [K52.9]  Acute pulmonary edema (HCC) [J81.0]  Acute renal failure (ARF) [N17.9]  Acute renal failure superimposed on chronic kidney disease, unspecified acute renal failure type, unspecified CKD stage [N17.9, N18.9]    IF APPLICABLE: The Patient and/or patient representative Erinn and her family were provided with a choice of provider and agrees with the discharge plan. Freedom of choice list with basic dialogue that supports the patient's individualized plan of care/goals and shares the quality data associated with the

## 2025-06-16 NOTE — PLAN OF CARE
Problem: Discharge Planning  Goal: Discharge to home or other facility with appropriate resources  6/16/2025 0952 by Madison Acosta RN  Outcome: Progressing     Problem: Pain  Goal: Verbalizes/displays adequate comfort level or baseline comfort level  6/16/2025 0952 by Madison Acosta RN  Outcome: Progressing     Problem: Safety - Adult  Goal: Free from fall injury  6/16/2025 0952 by Madison Acosta RN  Outcome: Progressing     Problem: ABCDS Injury Assessment  Goal: Absence of physical injury  6/16/2025 0952 by Madison Acosta RN  Outcome: Progressing     Problem: Metabolic/Fluid and Electrolytes - Adult  Goal: Electrolytes maintained within normal limits  6/16/2025 0952 by Madison Acosta RN  Outcome: Progressing     Problem: Metabolic/Fluid and Electrolytes - Adult  Goal: Hemodynamic stability and optimal renal function maintained  6/16/2025 0952 by Madison Acosta RN  Outcome: Progressing     Problem: Hematologic - Adult  Goal: Maintains hematologic stability  6/16/2025 0952 by Madison Acosta RN  Outcome: Progressing

## 2025-06-16 NOTE — PLAN OF CARE
Problem: Discharge Planning  Goal: Discharge to home or other facility with appropriate resources  Outcome: Progressing     Problem: Pain  Goal: Verbalizes/displays adequate comfort level or baseline comfort level  Outcome: Progressing     Problem: Safety - Adult  Goal: Free from fall injury  Outcome: Progressing     Problem: ABCDS Injury Assessment  Goal: Absence of physical injury  Outcome: Progressing     Problem: Metabolic/Fluid and Electrolytes - Adult  Goal: Electrolytes maintained within normal limits  Outcome: Progressing     Problem: Metabolic/Fluid and Electrolytes - Adult  Goal: Hemodynamic stability and optimal renal function maintained  Outcome: Progressing     Problem: Hematologic - Adult  Goal: Maintains hematologic stability  Outcome: Progressing

## 2025-06-16 NOTE — PRE SEDATION
Sedation Pre-Procedure Note    Patient Name: Erinn Kulkarni  YOB: 1980  Medical Record Number: [id number]  Date:  6/16/25  Time: 1:56 PM    Indication:  Permcath for HD    Consent: I have discussed with the patient and/or the patient representative the indication, alternatives, and the possible risks and/or complications of the planned procedure and the anesthesia methods. The patient and/or patient representative appear to understand and agree to proceed.    Vital Signs:   Vitals:    06/16/25 1353   BP: (!) 156/94   Pulse: 81   Resp: 20   Temp:    SpO2: 99%       Labs  Lab Results   Component Value Date    INR 1.21 (H) 06/16/2025    PROTIME 15.6 (H) 06/16/2025     Lab Results   Component Value Date     06/16/2025     Estimated Creatinine Clearance: 4 mL/min (A) (based on SCr of 19.4 mg/dL ()).     Allergies:  No Known Allergies     Past Medical History:  Past Medical History:   Diagnosis Date    CKD (chronic kidney disease) stage 4, GFR 15-29 ml/min ([facility]) 6/14/2025       Past Surgical History:  No past surgical history on file.    Medications:   Current Facility-Administered Medications   Medication Dose Route Frequency Provider Last Rate Last Admin    epoetin monica-epbx (RETACRIT) injection 10,000 Units  10,000 Units SubCUTAneous Once per day on Monday Wednesday Friday Bruno Villar MD        ALPRAZolam (XANAX) tablet 0.25 mg  0.25 mg Oral BID PRN Lindsay Lopez MD        piperacillin-tazobactam (ZOSYN) 3,375 mg in sodium chloride 0.9 % 50 mL IVPB (addEASE)  3,375 mg IntraVENous Q12H Rogelio Shah MD   Stopped at 06/16/25 0925    labetalol (NORMODYNE) tablet 200 mg  200 mg Oral BID Bruno Villar MD   200 mg at 06/16/25 0903    0.9 % sodium chloride infusion   IntraVENous PRN Lindsay Lopez MD        NIFEdipine (PROCARDIA XL) extended release tablet 60 mg  60 mg Oral Daily Bruno Villar MD   60 mg at 06/16/25 6448    iron sucrose (VENOFER) injection 200  acetate  2 capsule Oral TID     atorvastatin  40 mg Oral Nightly    [START ON 6/20/2025] vitamin D  50,000 Units Oral Weekly    sodium chloride flush  5-40 mL IntraVENous 2 times per day     Home Meds:   Prior to Visit Medications    Medication Sig Taking? Authorizing Provider   atorvastatin (LIPITOR) 40 MG tablet TAKE 1 TABLET BY MOUTH DAILY Yes Diego Licona MD   vitamin D (ERGOCALCIFEROL) 1.25 MG (94353 UT) CAPS capsule Take 1 capsule by mouth once a week Yes Diego Licona MD   NIFEdipine (PROCARDIA XL) 60 MG extended release tablet Take 1 tablet by mouth in the morning and 1 tablet in the evening.  Kalia West MD   labetalol (NORMODYNE) 300 MG tablet Take 1 tablet by mouth 2 times daily  Kalia West MD       Recent relevant anticoagulation/antiplatelet therapy:  no    Pre-Sedation Documentation:   I have reviewed the patient's history and review of systems.  I have performed a targeted H&P and review of systems.    Exam:  General: NAD  Lungs: clear  Cardiovascular: normal.    Mallampati Airway Assessment:  Mallampati Class I - (soft palate, fauces, uvula & anterior/posterior tonsillar pillars are visible)    Prior History of Anesthesia Complications:   none    ASA Classification:  Class 3  Sedation/ Anesthesia Plan:   intravenous sedation    Medications Planned:   midazolam (Versed) intravenously and fentanyl intravenously

## 2025-06-16 NOTE — PROGRESS NOTES
Treatment time: 2 hrs    Net UF: 1000 ml    Pre weight: 82.6 kg  Post weight: 81.6  EDW: TBD    Access used: Rt CW TDC  Access function: Well tolerated, 200 BFR    Medications or blood products given: Retacrit 10,000 units    Regular outpatient schedule: TBD, TELLY    Summary of response to treatment: Pt tolerated first treatment well. No issues.

## 2025-06-16 NOTE — CARE COORDINATION
RPM Eligible - HTN & DM  Email sent to [facility] team on 06/16/2025 to inform of RPM eligibility.    Joanna Hilario RN BSN  Care Transition Nurse  [phone]

## 2025-06-17 ENCOUNTER — APPOINTMENT (OUTPATIENT)
Dept: GENERAL RADIOLOGY | Age: 45
DRG: 673 | End: 2025-06-17
Attending: INTERNAL MEDICINE
Payer: MEDICARE

## 2025-06-17 LAB
ALBUMIN SERPL-MCNC: 3.3 G/DL (ref 3.4–5)
ANION GAP SERPL CALCULATED.3IONS-SCNC: 17 MMOL/L (ref 3–16)
BASOPHILS # BLD: 0 K/UL (ref 0–0.2)
BASOPHILS NFR BLD: 0.1 %
BUN SERPL-MCNC: 70 MG/DL (ref 7–20)
CALCIUM SERPL-MCNC: 8.1 MG/DL (ref 8.3–10.6)
CHLORIDE SERPL-SCNC: 90 MMOL/L (ref 99–110)
CO2 SERPL-SCNC: 26 MMOL/L (ref 21–32)
CREAT SERPL-MCNC: 11.7 MG/DL (ref 0.6–1.1)
DEPRECATED RDW RBC AUTO: 16.9 % (ref 12.4–15.4)
EOSINOPHIL # BLD: 0 K/UL (ref 0–0.6)
EOSINOPHIL NFR BLD: 0.3 %
GFR SERPLBLD CREATININE-BSD FMLA CKD-EPI: 4 ML/MIN/{1.73_M2}
GLUCOSE BLD-MCNC: 102 MG/DL (ref 70–99)
GLUCOSE BLD-MCNC: 139 MG/DL (ref 70–99)
GLUCOSE BLD-MCNC: 171 MG/DL (ref 70–99)
GLUCOSE BLD-MCNC: 201 MG/DL (ref 70–99)
GLUCOSE BLD-MCNC: 237 MG/DL (ref 70–99)
GLUCOSE SERPL-MCNC: 119 MG/DL (ref 70–99)
HCT VFR BLD AUTO: 27 % (ref 36–48)
HGB BLD-MCNC: 8.9 G/DL (ref 12–16)
LYMPHOCYTES # BLD: 1.7 K/UL (ref 1–5.1)
LYMPHOCYTES NFR BLD: 15.2 %
MCH RBC QN AUTO: 26.6 PG (ref 26–34)
MCHC RBC AUTO-ENTMCNC: 33.1 G/DL (ref 31–36)
MCV RBC AUTO: 80.5 FL (ref 80–100)
MONOCYTES # BLD: 1.3 K/UL (ref 0–1.3)
MONOCYTES NFR BLD: 11.8 %
NEUTROPHILS # BLD: 8.2 K/UL (ref 1.7–7.7)
NEUTROPHILS NFR BLD: 72.6 %
PERFORMED ON: ABNORMAL
PHOSPHATE SERPL-MCNC: 6.2 MG/DL (ref 2.5–4.9)
PLATELET # BLD AUTO: 281 K/UL (ref 135–450)
PMV BLD AUTO: 9.6 FL (ref 5–10.5)
POTASSIUM SERPL-SCNC: 3.9 MMOL/L (ref 3.5–5.1)
RBC # BLD AUTO: 3.36 M/UL (ref 4–5.2)
SODIUM SERPL-SCNC: 133 MMOL/L (ref 136–145)
WBC # BLD AUTO: 11.3 K/UL (ref 4–11)

## 2025-06-17 PROCEDURE — 2500000003 HC RX 250 WO HCPCS: Performed by: HOSPITALIST

## 2025-06-17 PROCEDURE — 99233 SBSQ HOSP IP/OBS HIGH 50: CPT | Performed by: INTERNAL MEDICINE

## 2025-06-17 PROCEDURE — 90935 HEMODIALYSIS ONE EVALUATION: CPT

## 2025-06-17 PROCEDURE — 6370000000 HC RX 637 (ALT 250 FOR IP): Performed by: HOSPITALIST

## 2025-06-17 PROCEDURE — 2500000003 HC RX 250 WO HCPCS: Performed by: INTERNAL MEDICINE

## 2025-06-17 PROCEDURE — 6370000000 HC RX 637 (ALT 250 FOR IP): Performed by: INTERNAL MEDICINE

## 2025-06-17 PROCEDURE — 80069 RENAL FUNCTION PANEL: CPT

## 2025-06-17 PROCEDURE — 6370000000 HC RX 637 (ALT 250 FOR IP)

## 2025-06-17 PROCEDURE — 71045 X-RAY EXAM CHEST 1 VIEW: CPT

## 2025-06-17 PROCEDURE — 2580000003 HC RX 258: Performed by: HOSPITALIST

## 2025-06-17 PROCEDURE — 6360000002 HC RX W HCPCS: Performed by: HOSPITALIST

## 2025-06-17 PROCEDURE — 2060000000 HC ICU INTERMEDIATE R&B

## 2025-06-17 PROCEDURE — 85025 COMPLETE CBC W/AUTO DIFF WBC: CPT

## 2025-06-17 PROCEDURE — 6360000002 HC RX W HCPCS: Performed by: INTERNAL MEDICINE

## 2025-06-17 RX ORDER — INSULIN LISPRO 100 [IU]/ML
0-4 INJECTION, SOLUTION INTRAVENOUS; SUBCUTANEOUS
Status: DISCONTINUED | OUTPATIENT
Start: 2025-06-17 | End: 2025-06-21 | Stop reason: HOSPADM

## 2025-06-17 RX ADMIN — LABETALOL HYDROCHLORIDE 200 MG: 200 TABLET, FILM COATED ORAL at 21:54

## 2025-06-17 RX ADMIN — LABETALOL HYDROCHLORIDE 200 MG: 200 TABLET, FILM COATED ORAL at 12:00

## 2025-06-17 RX ADMIN — SODIUM CHLORIDE, PRESERVATIVE FREE 10 ML: 5 INJECTION INTRAVENOUS at 21:55

## 2025-06-17 RX ADMIN — ATORVASTATIN CALCIUM 40 MG: 40 TABLET, FILM COATED ORAL at 21:54

## 2025-06-17 RX ADMIN — CALCIUM ACETATE 1334 MG: 667 CAPSULE ORAL at 19:30

## 2025-06-17 RX ADMIN — SODIUM CHLORIDE, PRESERVATIVE FREE 10 ML: 5 INJECTION INTRAVENOUS at 12:00

## 2025-06-17 RX ADMIN — IRON SUCROSE 200 MG: 20 INJECTION, SOLUTION INTRAVENOUS at 12:00

## 2025-06-17 RX ADMIN — NIFEDIPINE 60 MG: 60 TABLET, FILM COATED, EXTENDED RELEASE ORAL at 12:00

## 2025-06-17 RX ADMIN — CALCIUM ACETATE 1334 MG: 667 CAPSULE ORAL at 11:59

## 2025-06-17 RX ADMIN — INSULIN LISPRO 5 UNITS: 100 INJECTION, SOLUTION INTRAVENOUS; SUBCUTANEOUS at 19:31

## 2025-06-17 RX ADMIN — PIPERACILLIN AND TAZOBACTAM 3375 MG: 3; .375 INJECTION, POWDER, LYOPHILIZED, FOR SOLUTION INTRAVENOUS at 05:57

## 2025-06-17 RX ADMIN — LOPERAMIDE HYDROCHLORIDE 2 MG: 2 CAPSULE ORAL at 21:58

## 2025-06-17 RX ADMIN — CALCIUM ACETATE 1334 MG: 667 CAPSULE ORAL at 14:10

## 2025-06-17 RX ADMIN — INSULIN LISPRO 5 UNITS: 100 INJECTION, SOLUTION INTRAVENOUS; SUBCUTANEOUS at 14:06

## 2025-06-17 RX ADMIN — INSULIN LISPRO 1 UNITS: 100 INJECTION, SOLUTION INTRAVENOUS; SUBCUTANEOUS at 21:55

## 2025-06-17 RX ADMIN — PIPERACILLIN AND TAZOBACTAM 3375 MG: 3; .375 INJECTION, POWDER, LYOPHILIZED, FOR SOLUTION INTRAVENOUS at 19:37

## 2025-06-17 NOTE — PROGRESS NOTES
V2.0    [facility] Progress Note      Name:  Erinn Kulkarni /Age/Sex: 1980  (44 y.o. female)   MRN & CSN:  [id number] & [id number] Encounter Date/Time: 2025 11:48 PM EDT   Location:  [id number] PCP: Diego Licona MD     Attending:Lindsay Lopez MD       Hospital Day: 4    Assessment and Recommendations   Erinn Kulkarni is a 44 y.o. female who presents with Acute renal failure superimposed on chronic kidney disease      Plan:     ARF on CKD III  - creat elevated on admit  - nephrology consulted  - avoid nephrotoxic meds  - s/p TDC placement and started RRT per nephro on   - plan renal biopsy per nephro on Tuesday  - receiving solumedrol     Anemia  - hb <7, received 1 unit blood transfusion  - recheck hb post transfusion 7.9  - iron studies with iron def, started on IV iron  - hb dropped to 7.0, recheck hb at 3pm and transfuse if <7  - suspect 2/2 dilution/iron def/CKD - less likely bleeding  - hb dropped to 6.7, ordered another unit blood, recheck hb post transfusion    Hyponatremia  - Na dropped to 126, suspect 2/2 volume overload    HTN - fairly controlled, cont home meds    Hyperglycemia  - suspect 2/2 steroid  - on SSI, add lispro TID     Diarrhea - resolved     Poss UTI- UA abnormal, cont IV abx, await urine cx - neg     Pt has some developmental delay, sister is POA      Diet ADULT DIET; Regular; Low Sodium (2 gm); Low Potassium (Less than 3000 mg/day); Low Phosphorus (Less than 1000 mg)   DVT Prophylaxis [] Lovenox, []  Heparin, [x] SCDs, [] Ambulation,  [] Eliquis, [] Xarelto  [] Coumadin   Code Status Full Code             Personally reviewed Lab Studies and Imaging         Subjective:     Pt feeling anxious about TDC placement and dialysis today      Review of Systems:      Pertinent positives and negatives discussed in HPI    Objective:     Intake/Output Summary (Last 24 hours) at 2025 9432  Last data filed at 2025  Gross per 24 hour   Intake

## 2025-06-17 NOTE — CARE COORDINATION
DISCHARGE PLANNING:  Met with patient and spoke with sister (Christa) over the phone regarding discharge planning. Patient and Christa are agreeable to OP HD to be scheduled at [facility] T, TH, S and prefer mid morning. [facility] portal initiated. Patient will need updated chest xray and Hep B antibody, MD perfect served for orders. Sister expressing concern about transportation, LSW encouraged Christa to call [facility] regarding services that patient maybe eligible for especially transportation. LSW discussed RPM and left information at bedside. Christa would like to review information with patient prior to enrolling in program.   Electronically signed by FILI Saunders on 6/17/2025 at 3:52 PM  [phone]

## 2025-06-17 NOTE — PROGRESS NOTES
Treatment time: 2.5 hrs    Net UF: 1000 ml     Pre weight: 82.9 kg  Post weight: 82 kg     Access used: Rtdc  Access function:  tolerated well,  BFR 200ml/min     Medications or blood products given: heparin dwells     Regular outpatient schedule: TTS     Summary of response to treatment: Pt tolerated well. Pt remained stable throughout entire treatment and upon exiting the hemodialysis suite.      Copy of dialysis treatment record placed in chart, to be scanned into EMR.

## 2025-06-17 NOTE — PROGRESS NOTES
Name:  Erinn Kulkarni /Age/Sex: 1980  (44 y.o. female)   MRN & CSN:  [id number] & [id number] Encounter Date/Time: 2025 2:19 PM EDT   Location:  [id number] PCP: Diego Licona MD     Attending:Lg Osman MD       Erinn Kulkarni is a 44 y.o. female with  has a past medical history of CKD (chronic kidney disease) stage 4, GFR 15-29 ml/min ([facility]). who presents with Acute renal failure superimposed on chronic kidney disease    Hospital Day: 5      Assessment and Plan     TELLY on CKD   Creatinine 20.5 on admission   UA with hematuria and proteinuria   Started on renal replacement therap   Status post empiric IV Solu-Medrol   Nephrology following   Serology pending   Avoid nephrotoxic medication   Plan for kidney biopsy    Anemia   Status post blood transfusion   Iron studies with iron deficiency   Monitor with CBC in a.m.    Essential hypertension   Continue home medications    Hyperglycemia   Low dose sliding scale insulin along with lispro TID    Objective:       Intake/Output Summary (Last 24 hours) at 2025 1419  Last data filed at 2025 1110  Gross per 24 hour   Intake 240 ml   Output 100 ml   Net 140 ml      Vitals:   Vitals:    25 0444 25 0531 25 1129 25 1158   BP:   (!) 149/83 (!) 149/87   Pulse:  69 80 76   Resp:   17    Temp:   97.3 °F (36.3 °C) 97.7 °F (36.5 °C)   TempSrc:   Oral Oral   SpO2:   99%    Weight: 82.9 kg (182 lb 12.2 oz)      Height:             Interval history:     Seen and examined at bedside. No acute events overnight.  Denies any active abdominal pain, nausea or vomiting    Physical Exam:        General: NAD  Eyes: EOMI  ENT: neck supple  Cardiovascular: Regular rate.  Respiratory: Clear to auscultation  Gastrointestinal: Soft, non tender  Genitourinary: no suprapubic tenderness  Musculoskeletal: No edema  Neuro: Alert and oriented  Psych: Mood appropriate.     Review of Systems:      10 point ROS negative except

## 2025-06-17 NOTE — PROGRESS NOTES
Nephrology Consult Note                                                                                                                                                                                                                                                                                                                                                               Office : [phone]     Fax :[phone]    Patient's Name: Erinn Kulkarni  4:30 PM  6/17/2025    Reason for Consult:  TELLY   Requesting Physician:  Diego Licona MD  Chief Complaint:    Chief Complaint   Patient presents with    Hypertension     Patient sent from primary care office for high blood pressure. Patient was at appt for abd pain with significant weight loss over the last month.        Assessment/Plan       # TELLY on CKD 3/4    # HTN    # Anemia    # Acid- base/ Electrolyte imbalance     # MBD     Plan   - HD 3 days in a row   - HD in am   - Ur studies - Hematuria and proteinuria   - r/o GN  - IV solumedrol x 3 doses - done   - serology pending  - renal biopsy - not done yet by radiology   - monitor vol status     - discussed with Sister     [facility]     - Can be discharged once HD unit placement is done     Recommend to dose adjust all medications  based on renal functions  Maintain SBP> 90 mmHg   Daily weights   AVOID NSAIDs  Avoid Nephrotoxins  Monitor Intake/Output  Call if significant decrease in urine output      History of Present Ilness:    Erinn Kulkarni is a 44 y.o. female with pmh of hypertension, CKD stage IIIb/4 who presents with   Complaint of diarrhea  Onset of diarrhea since more than week  Per patient diarrhea is watery in nature not foul-smelling and denies any blood in diarrhea  Associated with mild abdominal cramps  Denies any fever or chills  Denies any nausea or vomiting  Denies shortness of breath or chest pain  She was suppose to see us next week but ended up  normal...

## 2025-06-17 NOTE — PROGRESS NOTES
Nephrology Consult Note                                                                                                                                                                                                                                                                                                                                                               Office : [phone]     Fax :[phone]    Patient's Name: Erinn Kulkarni  9:15 PM  6/16/2025    Reason for Consult:  TELLY   Requesting Physician:  Diego Licona MD  Chief Complaint:    Chief Complaint   Patient presents with    Hypertension     Patient sent from primary care office for high blood pressure. Patient was at appt for abd pain with significant weight loss over the last month.        Assessment/Plan       # TELLY on CKD 3/4    # HTN    # Anemia    # Acid- base/ Electrolyte imbalance     # MBD     Plan   - HD today and in am   - Ur studies - Hematuria and proteinuria   - r/o GN  - IV solumedrol x 3 doses - done   - serology pending  - renal biopsy tomorrow   - stop bicarb gtt   - monitor vol status     - discussed with Sister     - HD unit arranged - [facility]     Recommend to dose adjust all medications  based on renal functions  Maintain SBP> 90 mmHg   Daily weights   AVOID NSAIDs  Avoid Nephrotoxins  Monitor Intake/Output  Call if significant decrease in urine output      History of Present Ilness:    Erinn Kulkarni is a 44 y.o. female with pmh of hypertension, CKD stage IIIb/4 who presents with   Complaint of diarrhea  Onset of diarrhea since more than week  Per patient diarrhea is watery in nature not foul-smelling and denies any blood in diarrhea  Associated with mild abdominal cramps  Denies any fever or chills  Denies any nausea or vomiting  Denies shortness of breath or chest pain  She was suppose to see us next week but ended up here          Interval hx     Pt had HD today   Khoi well   No N/V/D        Past Medical History:   Diagnosis Date    CKD (chronic kidney disease) stage 4, GFR 15-29 ml/min ([facility]) 6/14/2025       Past Surgical History:   Procedure Laterality Date    IR TUNNELED CVC PLACE WO SQ PORT/PUMP > 5 YEARS  6/16/2025    IR TUNNELED CVC PLACE WO SQ PORT/PUMP > 5 YEARS 6/16/2025 [facility]       Family History   Problem Relation Age of Onset    Cancer Father     High Blood Pressure Father     Cancer Sister         breast cancer    Cancer Brother         reports that she has never smoked. She has never used smokeless tobacco. She reports that she does not drink alcohol and does not use drugs.    Allergies:  Patient has no known allergies.    Current Medications:    epoetin monica-epbx (RETACRIT) injection 10,000 Units, Once per day on Monday Wednesday Friday  ALPRAZolam (XANAX) tablet 0.25 mg, BID PRN  glucose chewable tablet 16 g, PRN  dextrose bolus 10% 125 mL, PRN   Or  dextrose bolus 10% 250 mL, PRN  glucagon injection 1 mg, PRN  dextrose 10 % infusion, Continuous PRN  insulin lispro (HUMALOG,ADMELOG) injection vial 0-8 Units, 4 times per day  insulin lispro (HUMALOG,ADMELOG) injection vial 5 Units, TID WC  piperacillin-tazobactam (ZOSYN) 3,375 mg in sodium chloride 0.9 % 50 mL IVPB (addEASE), Q12H  labetalol (NORMODYNE) tablet 200 mg, BID  0.9 % sodium chloride infusion, PRN  NIFEdipine (PROCARDIA XL) extended release tablet 60 mg, Daily  iron sucrose (VENOFER) injection 200 mg, Q24H  calcium acetate (PHOSLO) capsule 1,334 mg, TID WC  atorvastatin (LIPITOR) tablet 40 mg, Nightly  [START ON 6/20/2025] vitamin D (ERGOCALCIFEROL) capsule 50,000 Units, Weekly  loperamide (IMODIUM) capsule 2 mg, 4x Daily PRN  sodium chloride flush 0.9 % injection 5-40 mL, 2 times per day  sodium chloride flush 0.9 % injection 5-40 mL, PRN  0.9 % sodium chloride infusion, PRN  ondansetron (ZOFRAN-ODT) disintegrating tablet 4 mg, Q8H PRN   Or  ondansetron (ZOFRAN) injection 4 mg, Q6H PRN  polyethylene

## 2025-06-18 ENCOUNTER — APPOINTMENT (OUTPATIENT)
Dept: CT IMAGING | Age: 45
DRG: 673 | End: 2025-06-18
Payer: MEDICARE

## 2025-06-18 LAB
ALBUMIN SERPL-MCNC: 3 G/DL (ref 3.4–5)
ANION GAP SERPL CALCULATED.3IONS-SCNC: 13 MMOL/L (ref 3–16)
ANION GAP SERPL CALCULATED.3IONS-SCNC: 13 MMOL/L (ref 3–16)
BASOPHILS # BLD: 0 K/UL (ref 0–0.2)
BASOPHILS NFR BLD: 0.2 %
BUN SERPL-MCNC: 26 MG/DL (ref 7–20)
BUN SERPL-MCNC: 64 MG/DL (ref 7–20)
CALCIUM SERPL-MCNC: 8.1 MG/DL (ref 8.3–10.6)
CALCIUM SERPL-MCNC: 8.9 MG/DL (ref 8.3–10.6)
CHLORIDE SERPL-SCNC: 93 MMOL/L (ref 99–110)
CHLORIDE SERPL-SCNC: 99 MMOL/L (ref 99–110)
CO2 SERPL-SCNC: 23 MMOL/L (ref 21–32)
CO2 SERPL-SCNC: 25 MMOL/L (ref 21–32)
CREAT SERPL-MCNC: 11.9 MG/DL (ref 0.6–1.1)
CREAT SERPL-MCNC: 5.7 MG/DL (ref 0.6–1.1)
DEPRECATED RDW RBC AUTO: 17 % (ref 12.4–15.4)
EOSINOPHIL # BLD: 0.2 K/UL (ref 0–0.6)
EOSINOPHIL NFR BLD: 3.1 %
GFR SERPLBLD CREATININE-BSD FMLA CKD-EPI: 4 ML/MIN/{1.73_M2}
GFR SERPLBLD CREATININE-BSD FMLA CKD-EPI: 9 ML/MIN/{1.73_M2}
GLUCOSE BLD-MCNC: 106 MG/DL (ref 70–99)
GLUCOSE BLD-MCNC: 127 MG/DL (ref 70–99)
GLUCOSE BLD-MCNC: 159 MG/DL (ref 70–99)
GLUCOSE BLD-MCNC: 93 MG/DL (ref 70–99)
GLUCOSE SERPL-MCNC: 100 MG/DL (ref 70–99)
GLUCOSE SERPL-MCNC: 119 MG/DL (ref 70–99)
HBV SURFACE AB SERPL IA-ACNC: 8.43 MIU/ML
HCT VFR BLD AUTO: 32.1 % (ref 36–48)
HGB BLD-MCNC: 10.6 G/DL (ref 12–16)
LYMPHOCYTES # BLD: 1.2 K/UL (ref 1–5.1)
LYMPHOCYTES NFR BLD: 15.9 %
MCH RBC QN AUTO: 27.3 PG (ref 26–34)
MCHC RBC AUTO-ENTMCNC: 33 G/DL (ref 31–36)
MCV RBC AUTO: 82.7 FL (ref 80–100)
MONOCYTES # BLD: 0.9 K/UL (ref 0–1.3)
MONOCYTES NFR BLD: 11.6 %
NEUTROPHILS # BLD: 5.3 K/UL (ref 1.7–7.7)
NEUTROPHILS NFR BLD: 69.2 %
PERFORMED ON: ABNORMAL
PERFORMED ON: NORMAL
PHOSPHATE SERPL-MCNC: 6.1 MG/DL (ref 2.5–4.9)
PLATELET # BLD AUTO: 287 K/UL (ref 135–450)
PMV BLD AUTO: 9.6 FL (ref 5–10.5)
POTASSIUM SERPL-SCNC: 4.1 MMOL/L (ref 3.5–5.1)
POTASSIUM SERPL-SCNC: 4.2 MMOL/L (ref 3.5–5.1)
RBC # BLD AUTO: 3.89 M/UL (ref 4–5.2)
SODIUM SERPL-SCNC: 131 MMOL/L (ref 136–145)
SODIUM SERPL-SCNC: 135 MMOL/L (ref 136–145)
WBC # BLD AUTO: 7.7 K/UL (ref 4–11)

## 2025-06-18 PROCEDURE — 2580000003 HC RX 258: Performed by: HOSPITALIST

## 2025-06-18 PROCEDURE — 6370000000 HC RX 637 (ALT 250 FOR IP): Performed by: INTERNAL MEDICINE

## 2025-06-18 PROCEDURE — 36415 COLL VENOUS BLD VENIPUNCTURE: CPT

## 2025-06-18 PROCEDURE — 6370000000 HC RX 637 (ALT 250 FOR IP): Performed by: HOSPITALIST

## 2025-06-18 PROCEDURE — 99233 SBSQ HOSP IP/OBS HIGH 50: CPT | Performed by: INTERNAL MEDICINE

## 2025-06-18 PROCEDURE — 36556 INSERT NON-TUNNEL CV CATH: CPT

## 2025-06-18 PROCEDURE — 2500000003 HC RX 250 WO HCPCS: Performed by: HOSPITALIST

## 2025-06-18 PROCEDURE — 94760 N-INVAS EAR/PLS OXIMETRY 1: CPT

## 2025-06-18 PROCEDURE — 2060000000 HC ICU INTERMEDIATE R&B

## 2025-06-18 PROCEDURE — 85025 COMPLETE CBC W/AUTO DIFF WBC: CPT

## 2025-06-18 PROCEDURE — 2500000003 HC RX 250 WO HCPCS: Performed by: INTERNAL MEDICINE

## 2025-06-18 PROCEDURE — 86706 HEP B SURFACE ANTIBODY: CPT

## 2025-06-18 PROCEDURE — 80069 RENAL FUNCTION PANEL: CPT

## 2025-06-18 PROCEDURE — 6360000002 HC RX W HCPCS: Performed by: INTERNAL MEDICINE

## 2025-06-18 PROCEDURE — 6360000002 HC RX W HCPCS: Performed by: HOSPITALIST

## 2025-06-18 PROCEDURE — 90935 HEMODIALYSIS ONE EVALUATION: CPT

## 2025-06-18 RX ADMIN — CALCIUM ACETATE 1334 MG: 667 CAPSULE ORAL at 12:30

## 2025-06-18 RX ADMIN — PIPERACILLIN AND TAZOBACTAM 3375 MG: 3; .375 INJECTION, POWDER, LYOPHILIZED, FOR SOLUTION INTRAVENOUS at 18:37

## 2025-06-18 RX ADMIN — PIPERACILLIN AND TAZOBACTAM 3375 MG: 3; .375 INJECTION, POWDER, LYOPHILIZED, FOR SOLUTION INTRAVENOUS at 06:06

## 2025-06-18 RX ADMIN — CALCIUM ACETATE 1334 MG: 667 CAPSULE ORAL at 09:14

## 2025-06-18 RX ADMIN — CALCIUM ACETATE 1334 MG: 667 CAPSULE ORAL at 18:38

## 2025-06-18 RX ADMIN — ATORVASTATIN CALCIUM 40 MG: 40 TABLET, FILM COATED ORAL at 20:57

## 2025-06-18 RX ADMIN — LABETALOL HYDROCHLORIDE 200 MG: 200 TABLET, FILM COATED ORAL at 09:14

## 2025-06-18 RX ADMIN — SODIUM CHLORIDE, PRESERVATIVE FREE 10 ML: 5 INJECTION INTRAVENOUS at 20:57

## 2025-06-18 RX ADMIN — LABETALOL HYDROCHLORIDE 200 MG: 200 TABLET, FILM COATED ORAL at 20:57

## 2025-06-18 RX ADMIN — SODIUM CHLORIDE, PRESERVATIVE FREE 10 ML: 5 INJECTION INTRAVENOUS at 09:14

## 2025-06-18 RX ADMIN — EPOETIN ALFA-EPBX 10000 UNITS: 10000 INJECTION, SOLUTION INTRAVENOUS; SUBCUTANEOUS at 16:29

## 2025-06-18 RX ADMIN — NIFEDIPINE 60 MG: 60 TABLET, FILM COATED, EXTENDED RELEASE ORAL at 09:14

## 2025-06-18 RX ADMIN — IRON SUCROSE 200 MG: 20 INJECTION, SOLUTION INTRAVENOUS at 12:30

## 2025-06-18 NOTE — PROGRESS NOTES
Order placed and spoke to pts RN about making pt NPO for renal biopsy today.  Pt is on the schedule for biopsy at 1230 today.

## 2025-06-18 NOTE — PLAN OF CARE
Problem: Pain  Goal: Verbalizes/displays adequate comfort level or baseline comfort level  Outcome: Progressing     Problem: Safety - Adult  Goal: Free from fall injury  Outcome: Progressing     Problem: Hematologic - Adult  Goal: Maintains hematologic stability  Outcome: Progressing

## 2025-06-18 NOTE — CARE COORDINATION
Discharge Planning:     Hep B surface antibody and chest xray uploaded to the [facility] portal. Financially cleared.    Tentatively scheduled for:    [email or web address]  Chair time: T/T/S Early Morning    Should be finalized tomorrow.     Was scheduled for renal biopsy today at 12:30pm.     Alma Delia FUENTES RN  Case Management  [phone]    Electronically signed by Alma Delia Jackson RN on 6/18/2025 at 6:14 PM

## 2025-06-18 NOTE — PROGRESS NOTES
Name:  Erinn Kulkarni /Age/Sex: 1980  (44 y.o. female)   MRN & CSN:  [id number] & [id number] Encounter Date/Time: 2025 2:19 PM EDT   Location:  [id number] PCP: Diego Licona MD     Attending:Lg Osman MD       Erinn Kulkarni is a 44 y.o. female with  has a past medical history of CKD (chronic kidney disease) stage 4, GFR 15-29 ml/min ([facility]). who presents with Acute renal failure superimposed on chronic kidney disease    Hospital Day: 6      Assessment and Plan     TELLY on CKD   Creatinine 20.5 on admission   UA with hematuria and proteinuria   Started on hemodialysis   Status post empiric IV Solu-Medrol   Nephrology following   Serology pending   Avoid nephrotoxic medication   Plan for kidney biopsy later today    following her outpatient hemodialysis set up    Anemia   Status post blood transfusion   Iron studies with iron deficiency   Monitor with CBC in a.m.    Essential hypertension   Continue home medications    Hyperglycemia   Low dose sliding scale insulin for now   Monitor with fingerstick checks    Objective:       Intake/Output Summary (Last 24 hours) at 2025 1355  Last data filed at 2025 1041  Gross per 24 hour   Intake 386.56 ml   Output 225 ml   Net 161.56 ml      Vitals:   Vitals:    25 0357 25 0430 25 0855 25 1045   BP: (!) 148/92  (!) 160/105 (!) 161/98   Pulse: 80  83 75   Resp: 18  17    Temp: 98.1 °F (36.7 °C)  98.7 °F (37.1 °C)    TempSrc: Oral  Oral    SpO2: 97%  97%    Weight:  82.4 kg (181 lb 10.5 oz)     Height:             Interval history:     Seen and examined at bedside. No acute events overnight.  Denies any active chest pain or shortness of breath    Physical Exam:        General: NAD  Eyes: EOMI  ENT: neck supple  Cardiovascular: Regular rate.  Respiratory: Clear to auscultation  Gastrointestinal: Soft, non tender  Genitourinary: no suprapubic tenderness  Musculoskeletal: No edema  Neuro:

## 2025-06-18 NOTE — PROGRESS NOTES
Treatment time: 3 Hours    Net UF: 1 liter    Pre weight: 82.4 kg  Post weight: 81.4 kg  EDW: TELLY    Access used: Right CVC.   Access function: Access site located on the right chest wall had clean dry and intact CVC dressing. No signs of infection noted. No redness, hematoma nor swelling was observed. No discharges was seen. Both ports had good flow.  Dressing changed was due on Friday 6/20/2025.    Medications or blood products given: heparin dwell    Regular outpatient schedule: TELLY    Summary of response to treatment: 13: 20: Treatment set at 1 liter for 3 hours via Right CVC. Access site located on the right chest wall had clean dry and intact CVC dressing. No signs of infection noted. No redness, hematoma nor swelling was observed. No discharges was seen. Both ports had good flow.  Dressing changed was due on Friday 6/20/2025. Parameters set accordingly. Hooked to HD machine. Monitored from time to time. 16:30: Treatment completed Returned blood aseptically. HD tolerated well.     Copy of dialysis treatment record placed in chart, to be scanned into EMR.

## 2025-06-19 ENCOUNTER — APPOINTMENT (OUTPATIENT)
Dept: CT IMAGING | Age: 45
DRG: 673 | End: 2025-06-19
Attending: INTERNAL MEDICINE
Payer: MEDICARE

## 2025-06-19 LAB
ANA SER QL IA: NEGATIVE
ANION GAP SERPL CALCULATED.3IONS-SCNC: 11 MMOL/L (ref 3–16)
BUN SERPL-MCNC: 32 MG/DL (ref 7–20)
CALCIUM SERPL-MCNC: 8.4 MG/DL (ref 8.3–10.6)
CHLORIDE SERPL-SCNC: 100 MMOL/L (ref 99–110)
CO2 SERPL-SCNC: 24 MMOL/L (ref 21–32)
CREAT SERPL-MCNC: 6.9 MG/DL (ref 0.6–1.1)
DEPRECATED RDW RBC AUTO: 16.6 % (ref 12.4–15.4)
GFR SERPLBLD CREATININE-BSD FMLA CKD-EPI: 7 ML/MIN/{1.73_M2}
GLUCOSE BLD-MCNC: 129 MG/DL (ref 70–99)
GLUCOSE BLD-MCNC: 169 MG/DL (ref 70–99)
GLUCOSE BLD-MCNC: 97 MG/DL (ref 70–99)
GLUCOSE BLD-MCNC: 99 MG/DL (ref 70–99)
GLUCOSE SERPL-MCNC: 110 MG/DL (ref 70–99)
HCT VFR BLD AUTO: 26.7 % (ref 36–48)
HGB BLD-MCNC: 8.7 G/DL (ref 12–16)
MCH RBC QN AUTO: 27.4 PG (ref 26–34)
MCHC RBC AUTO-ENTMCNC: 32.7 G/DL (ref 31–36)
MCV RBC AUTO: 83.8 FL (ref 80–100)
PERFORMED ON: ABNORMAL
PERFORMED ON: ABNORMAL
PERFORMED ON: NORMAL
PERFORMED ON: NORMAL
PLATELET # BLD AUTO: 256 K/UL (ref 135–450)
PMV BLD AUTO: 9.7 FL (ref 5–10.5)
POTASSIUM SERPL-SCNC: 3.9 MMOL/L (ref 3.5–5.1)
RBC # BLD AUTO: 3.18 M/UL (ref 4–5.2)
RHEUMATOID FACT SER IA-ACNC: <10 IU/ML
SODIUM SERPL-SCNC: 135 MMOL/L (ref 136–145)
WBC # BLD AUTO: 9.9 K/UL (ref 4–11)

## 2025-06-19 PROCEDURE — 2500000003 HC RX 250 WO HCPCS: Performed by: HOSPITALIST

## 2025-06-19 PROCEDURE — 6360000002 HC RX W HCPCS: Performed by: INTERNAL MEDICINE

## 2025-06-19 PROCEDURE — 86431 RHEUMATOID FACTOR QUANT: CPT

## 2025-06-19 PROCEDURE — 2580000003 HC RX 258: Performed by: HOSPITALIST

## 2025-06-19 PROCEDURE — 94760 N-INVAS EAR/PLS OXIMETRY 1: CPT

## 2025-06-19 PROCEDURE — 6360000002 HC RX W HCPCS: Performed by: HOSPITALIST

## 2025-06-19 PROCEDURE — 36415 COLL VENOUS BLD VENIPUNCTURE: CPT

## 2025-06-19 PROCEDURE — 6370000000 HC RX 637 (ALT 250 FOR IP): Performed by: HOSPITALIST

## 2025-06-19 PROCEDURE — 6370000000 HC RX 637 (ALT 250 FOR IP): Performed by: INTERNAL MEDICINE

## 2025-06-19 PROCEDURE — 85027 COMPLETE CBC AUTOMATED: CPT

## 2025-06-19 PROCEDURE — 2060000000 HC ICU INTERMEDIATE R&B

## 2025-06-19 PROCEDURE — 80048 BASIC METABOLIC PNL TOTAL CA: CPT

## 2025-06-19 PROCEDURE — 2500000003 HC RX 250 WO HCPCS: Performed by: INTERNAL MEDICINE

## 2025-06-19 PROCEDURE — 86038 ANTINUCLEAR ANTIBODIES: CPT

## 2025-06-19 RX ORDER — LOSARTAN POTASSIUM 100 MG/1
100 TABLET ORAL DAILY
Status: DISCONTINUED | OUTPATIENT
Start: 2025-06-19 | End: 2025-06-21 | Stop reason: HOSPADM

## 2025-06-19 RX ORDER — NIFEDIPINE 60 MG/1
60 TABLET, EXTENDED RELEASE ORAL 2 TIMES DAILY
Status: DISCONTINUED | OUTPATIENT
Start: 2025-06-19 | End: 2025-06-21 | Stop reason: HOSPADM

## 2025-06-19 RX ADMIN — SODIUM CHLORIDE, PRESERVATIVE FREE 10 ML: 5 INJECTION INTRAVENOUS at 12:30

## 2025-06-19 RX ADMIN — LABETALOL HYDROCHLORIDE 200 MG: 200 TABLET, FILM COATED ORAL at 10:11

## 2025-06-19 RX ADMIN — CALCIUM ACETATE 1334 MG: 667 CAPSULE ORAL at 18:18

## 2025-06-19 RX ADMIN — ATORVASTATIN CALCIUM 40 MG: 40 TABLET, FILM COATED ORAL at 21:02

## 2025-06-19 RX ADMIN — LOSARTAN POTASSIUM 100 MG: 100 TABLET, FILM COATED ORAL at 18:18

## 2025-06-19 RX ADMIN — LABETALOL HYDROCHLORIDE 200 MG: 200 TABLET, FILM COATED ORAL at 21:02

## 2025-06-19 RX ADMIN — SODIUM CHLORIDE: 0.9 INJECTION, SOLUTION INTRAVENOUS at 05:54

## 2025-06-19 RX ADMIN — IRON SUCROSE 200 MG: 20 INJECTION, SOLUTION INTRAVENOUS at 12:22

## 2025-06-19 RX ADMIN — CALCIUM ACETATE 1334 MG: 667 CAPSULE ORAL at 12:39

## 2025-06-19 RX ADMIN — PIPERACILLIN AND TAZOBACTAM 3375 MG: 3; .375 INJECTION, POWDER, LYOPHILIZED, FOR SOLUTION INTRAVENOUS at 05:55

## 2025-06-19 RX ADMIN — NIFEDIPINE 60 MG: 60 TABLET, FILM COATED, EXTENDED RELEASE ORAL at 10:11

## 2025-06-19 RX ADMIN — NIFEDIPINE 60 MG: 60 TABLET, FILM COATED, EXTENDED RELEASE ORAL at 21:02

## 2025-06-19 RX ADMIN — PIPERACILLIN AND TAZOBACTAM 3375 MG: 3; .375 INJECTION, POWDER, LYOPHILIZED, FOR SOLUTION INTRAVENOUS at 19:22

## 2025-06-19 RX ADMIN — SODIUM CHLORIDE, PRESERVATIVE FREE 10 ML: 5 INJECTION INTRAVENOUS at 21:58

## 2025-06-19 ASSESSMENT — PAIN SCALES - GENERAL: PAINLEVEL_OUTOF10: 0

## 2025-06-19 NOTE — PLAN OF CARE
Problem: Discharge Planning  Goal: Discharge to home or other facility with appropriate resources  Outcome: Progressing  Flowsheets (Taken 6/19/2025 0103)  Discharge to home or other facility with appropriate resources:   Identify barriers to discharge with patient and caregiver   Arrange for needed discharge resources and transportation as appropriate   Identify discharge learning needs (meds, wound care, etc)   Arrange for interpreters to assist at discharge as needed     Problem: Pain  Goal: Verbalizes/displays adequate comfort level or baseline comfort level  Outcome: Progressing  Flowsheets (Taken 6/19/2025 0103)  Verbalizes/displays adequate comfort level or baseline comfort level:   Encourage patient to monitor pain and request assistance   Assess pain using appropriate pain scale   Administer analgesics based on type and severity of pain and evaluate response   Implement non-pharmacological measures as appropriate and evaluate response     Problem: Safety - Adult  Goal: Free from fall injury  Outcome: Progressing  Flowsheets (Taken 6/19/2025 0103)  Free From Fall Injury: Instruct family/caregiver on patient safety     Problem: ABCDS Injury Assessment  Goal: Absence of physical injury  Outcome: Progressing  Flowsheets (Taken 6/19/2025 0103)  Absence of Physical Injury: Implement safety measures based on patient assessment     Problem: Metabolic/Fluid and Electrolytes - Adult  Goal: Electrolytes maintained within normal limits  Outcome: Progressing  Flowsheets (Taken 6/19/2025 0103)  Electrolytes maintained within normal limits:   Monitor labs and assess patient for signs and symptoms of electrolyte imbalances   Administer electrolyte replacement as ordered   Monitor response to electrolyte replacements, including repeat lab results as appropriate     Problem: Metabolic/Fluid and Electrolytes - Adult  Goal: Hemodynamic stability and optimal renal function maintained  Outcome: Progressing  Flowsheets (Taken

## 2025-06-19 NOTE — PROGRESS NOTES
Name:  Erinn Kulkarni /Age/Sex: 1980  (44 y.o. female)   MRN & CSN:  [id number] & [id number] Encounter Date/Time: 2025 2:19 PM EDT   Location:  [id number] PCP: Diego Licona MD     Attending:Lg Osman MD       Erinn Kulkarni is a 44 y.o. female with  has a past medical history of CKD (chronic kidney disease) stage 4, GFR 15-29 ml/min ([facility]). who presents with Acute renal failure superimposed on chronic kidney disease    Hospital Day: 7      Assessment and Plan     TELLY on CKD   Creatinine 20.5 on admission   UA with hematuria and proteinuria   Started on hemodialysis   Status post empiric IV Solu-Medrol   Nephrology following   Serology negative   Avoid nephrotoxic medication   Kidney biopsy not able to be done as patient was hypertensive    following her outpatient hemodialysis set up    Anemia   Status post blood transfusion   Iron studies with iron deficiency   Monitor with CBC in a.m.    Essential hypertension   Continue home medications    Hyperglycemia   Low dose sliding scale insulin for now   Monitor with fingerstick checks    Objective:       Intake/Output Summary (Last 24 hours) at 2025 1713  Last data filed at 2025 1243  Gross per 24 hour   Intake --   Output 300 ml   Net -300 ml      Vitals:   Vitals:    25 0918 25 0945 25 1143 25 1536   BP: (!) 167/100  (!) 164/103 (!) 184/111   Pulse: 89 99 83 84   Resp: 16  18    Temp: 98.4 °F (36.9 °C)  98.1 °F (36.7 °C) 97.5 °F (36.4 °C)   TempSrc: Oral  Oral Oral   SpO2: 94% 100% 98% 100%   Weight:       Height:             Interval history:     Seen and examined at bedside. No acute events overnight. Denies any abdominal pain or nausea or vomiting. Plan for kidney biopsy tomorrow    Physical Exam:        General: NAD  Eyes: EOMI  ENT: neck supple  Cardiovascular: Regular rate.  Respiratory: Clear to auscultation  Gastrointestinal: Soft, non tender  Genitourinary: no

## 2025-06-19 NOTE — CARE COORDINATION
Discharge Planning:    Per [facility] Placement Portal, we are waiting for clinical clearance for patient's OP HD chair time. Message sent to [facility] to get a better understanding of timeline for clinical clearance. Awaiting response at this time. CM to continue to follow for updates.    Electronically signed by ISABEL DAVID RN on 6/19/2025 at 1:49 PM    Update provided to patient and patient's sister (Christa), at bedside re: clinical clearance. All questions answered at this time. RPM program discussed; patient and family would like to wait to sign up for this program as they have a lot of details to figure out.    Remote patient monitoring program discussed with patient  Patient has declined to participate.    Electronically signed by ISABEL DAVID RN on 6/19/2025 at 2:24 PM

## 2025-06-19 NOTE — PROGRESS NOTES
Physician Progress Note      PATIENT:               ESTHELA SHANKAR  CSN #:                  [id number]  :                       1980  ADMIT DATE:       2025 2:11 PM  DISCH DATE:  RESPONDING  PROVIDER #:        Lg Osman MD          QUERY TEXT:    Pulmonary edema is documented in the medical record . Please specify the type   and acuity:    The clinical indicators include:  H&P--\"  TELLY  on CKD 3b/4 : Etiology seems prerenal from intravascular volume   depletion.\"    CT abd pelvis --\" Pulmonary edema and small bilateral pleural effusions.\"    cr trend-- 20.5, 20.4, 19.4    6/15- neph pn--\" Acute renal failure on CKD 4--IR consulted for TDC placement   tomorrow am , patient agreed with this plan  Will get HD after TDC placement.\"    for TDC placement then dialysis, labs, CT, renal biopsy on Tuesday, essential   home meds, supportive care  Options provided:  -- Noncardiogenic acute pulmonary edema related to TELLY on CKD4  -- Other - I will add my own diagnosis  -- Disagree - Not applicable / Not valid  -- Disagree - Clinically unable to determine / Unknown  -- Refer to Clinical Documentation Reviewer    PROVIDER RESPONSE TEXT:    This patient has noncardiogenic acute pulmonary edema related to TELLY on CKD4    Query created by: Keagan Copeland on 2025 1:55 PM      Electronically signed by:  Lg Osman MD 2025 9:19 AM

## 2025-06-19 NOTE — PROGRESS NOTES
Nephrology Consult Note                                                                                                                                                                                                                                                                                                                                                               Office : [phone]     Fax :[phone]    Patient's Name: Erinn Kulkarni  6/18/2025    Reason for Consult:  TELLY   Requesting Physician:  Diego Licona MD  Chief Complaint:    Chief Complaint   Patient presents with    Hypertension     Patient sent from primary care office for high blood pressure. Patient was at appt for abd pain with significant weight loss over the last month.        Assessment/Plan       # TELLY on CKD 3/4    # HTN    # Anemia    # Acid- base/ Electrolyte imbalance     # MBD     Plan   - HD 3 days in a row   - no HD tomorrow   - Ur studies - Hematuria and proteinuria   - r/o GN  - IV solumedrol x 3 doses - done   - serology neg   - renal biopsy - not done yet by radiology   - monitor vol status     - discussed with Sister     [facility]     - Can be discharged once HD unit placement is done     Recommend to dose adjust all medications  based on renal functions  Maintain SBP> 90 mmHg   Daily weights   AVOID NSAIDs  Avoid Nephrotoxins  Monitor Intake/Output  Call if significant decrease in urine output      History of Present Ilness:    Erinn Kulkarni is a 44 y.o. female with pmh of hypertension, CKD stage IIIb/4 who presents with   Complaint of diarrhea  Onset of diarrhea since more than week  Per patient diarrhea is watery in nature not foul-smelling and denies any blood in diarrhea  Associated with mild abdominal cramps  Denies any fever or chills  Denies any nausea or vomiting  Denies shortness of breath or chest pain  She was suppose to see us next week but ended up here           Interval hx     Pt had HD today   Khoi well   No N/V/D       Past Medical History:   Diagnosis Date    CKD (chronic kidney disease) stage 4, GFR 15-29 ml/min ([facility]) 6/14/2025       Past Surgical History:   Procedure Laterality Date    IR TUNNELED CVC PLACE WO SQ PORT/PUMP > 5 YEARS  6/16/2025    IR TUNNELED CVC PLACE WO SQ PORT/PUMP > 5 YEARS 6/16/2025 [facility]       Family History   Problem Relation Age of Onset    Cancer Father     High Blood Pressure Father     Cancer Sister         breast cancer    Cancer Brother         reports that she has never smoked. She has never used smokeless tobacco. She reports that she does not drink alcohol and does not use drugs.    Allergies:  Patient has no known allergies.    Current Medications:    insulin lispro (HUMALOG,ADMELOG) injection vial 0-4 Units, 4x Daily WC  epoetin monica-epbx (RETACRIT) injection 10,000 Units, Once per day on Monday Wednesday Friday  ALPRAZolam (XANAX) tablet 0.25 mg, BID PRN  glucose chewable tablet 16 g, PRN  dextrose bolus 10% 125 mL, PRN   Or  dextrose bolus 10% 250 mL, PRN  glucagon injection 1 mg, PRN  dextrose 10 % infusion, Continuous PRN  piperacillin-tazobactam (ZOSYN) 3,375 mg in sodium chloride 0.9 % 50 mL IVPB (addEASE), Q12H  labetalol (NORMODYNE) tablet 200 mg, BID  NIFEdipine (PROCARDIA XL) extended release tablet 60 mg, Daily  iron sucrose (VENOFER) injection 200 mg, Q24H  calcium acetate (PHOSLO) capsule 1,334 mg, TID WC  atorvastatin (LIPITOR) tablet 40 mg, Nightly  [START ON 6/20/2025] vitamin D (ERGOCALCIFEROL) capsule 50,000 Units, Weekly  loperamide (IMODIUM) capsule 2 mg, 4x Daily PRN  sodium chloride flush 0.9 % injection 5-40 mL, 2 times per day  sodium chloride flush 0.9 % injection 5-40 mL, PRN  0.9 % sodium chloride infusion, PRN  ondansetron (ZOFRAN-ODT) disintegrating tablet 4 mg, Q8H PRN   Or  ondansetron (ZOFRAN) injection 4 mg, Q6H PRN  polyethylene glycol (GLYCOLAX) packet 17 g, Daily

## 2025-06-20 ENCOUNTER — CARE COORDINATION (OUTPATIENT)
Dept: CASE MANAGEMENT | Age: 45
End: 2025-06-20

## 2025-06-20 ENCOUNTER — APPOINTMENT (OUTPATIENT)
Dept: CT IMAGING | Age: 45
DRG: 673 | End: 2025-06-20
Attending: INTERNAL MEDICINE
Payer: MEDICARE

## 2025-06-20 LAB
ANION GAP SERPL CALCULATED.3IONS-SCNC: 12 MMOL/L (ref 3–16)
BUN SERPL-MCNC: 40 MG/DL (ref 7–20)
CALCIUM SERPL-MCNC: 8.6 MG/DL (ref 8.3–10.6)
CHLORIDE SERPL-SCNC: 100 MMOL/L (ref 99–110)
CO2 SERPL-SCNC: 22 MMOL/L (ref 21–32)
CREAT SERPL-MCNC: 8.6 MG/DL (ref 0.6–1.1)
DEPRECATED RDW RBC AUTO: 16.6 % (ref 12.4–15.4)
GFR SERPLBLD CREATININE-BSD FMLA CKD-EPI: 5 ML/MIN/{1.73_M2}
GLUCOSE BLD-MCNC: 222 MG/DL (ref 70–99)
GLUCOSE BLD-MCNC: 86 MG/DL (ref 70–99)
GLUCOSE SERPL-MCNC: 110 MG/DL (ref 70–99)
HCT VFR BLD AUTO: 25.8 % (ref 36–48)
HCT VFR BLD AUTO: 26.9 % (ref 36–48)
HGB BLD-MCNC: 8.3 G/DL (ref 12–16)
HGB BLD-MCNC: 8.8 G/DL (ref 12–16)
MCH RBC QN AUTO: 27.4 PG (ref 26–34)
MCHC RBC AUTO-ENTMCNC: 32.1 G/DL (ref 31–36)
MCV RBC AUTO: 85.2 FL (ref 80–100)
PERFORMED ON: ABNORMAL
PERFORMED ON: NORMAL
PLATELET # BLD AUTO: 255 K/UL (ref 135–450)
PMV BLD AUTO: 9.7 FL (ref 5–10.5)
POTASSIUM SERPL-SCNC: 3.9 MMOL/L (ref 3.5–5.1)
RBC # BLD AUTO: 3.03 M/UL (ref 4–5.2)
SODIUM SERPL-SCNC: 134 MMOL/L (ref 136–145)
WBC # BLD AUTO: 10.5 K/UL (ref 4–11)

## 2025-06-20 PROCEDURE — 0TB13ZX EXCISION OF LEFT KIDNEY, PERCUTANEOUS APPROACH, DIAGNOSTIC: ICD-10-PCS | Performed by: RADIOLOGY

## 2025-06-20 PROCEDURE — 50200 RENAL BIOPSY PERQ: CPT

## 2025-06-20 PROCEDURE — 85018 HEMOGLOBIN: CPT

## 2025-06-20 PROCEDURE — 85014 HEMATOCRIT: CPT

## 2025-06-20 PROCEDURE — 88300 SURGICAL PATH GROSS: CPT

## 2025-06-20 PROCEDURE — 6360000002 HC RX W HCPCS

## 2025-06-20 PROCEDURE — 6360000002 HC RX W HCPCS: Performed by: HOSPITALIST

## 2025-06-20 PROCEDURE — 2500000003 HC RX 250 WO HCPCS: Performed by: INTERNAL MEDICINE

## 2025-06-20 PROCEDURE — 36415 COLL VENOUS BLD VENIPUNCTURE: CPT

## 2025-06-20 PROCEDURE — 6360000002 HC RX W HCPCS: Performed by: INTERNAL MEDICINE

## 2025-06-20 PROCEDURE — 2580000003 HC RX 258: Performed by: HOSPITALIST

## 2025-06-20 PROCEDURE — 6370000000 HC RX 637 (ALT 250 FOR IP)

## 2025-06-20 PROCEDURE — 6370000000 HC RX 637 (ALT 250 FOR IP): Performed by: HOSPITALIST

## 2025-06-20 PROCEDURE — 6370000000 HC RX 637 (ALT 250 FOR IP): Performed by: INTERNAL MEDICINE

## 2025-06-20 PROCEDURE — 80048 BASIC METABOLIC PNL TOTAL CA: CPT

## 2025-06-20 PROCEDURE — 6360000002 HC RX W HCPCS: Performed by: RADIOLOGY

## 2025-06-20 PROCEDURE — 2060000000 HC ICU INTERMEDIATE R&B

## 2025-06-20 PROCEDURE — 2500000003 HC RX 250 WO HCPCS: Performed by: HOSPITALIST

## 2025-06-20 PROCEDURE — 90935 HEMODIALYSIS ONE EVALUATION: CPT

## 2025-06-20 PROCEDURE — 85027 COMPLETE CBC AUTOMATED: CPT

## 2025-06-20 RX ORDER — MIDODRINE HYDROCHLORIDE 5 MG/1
5 TABLET ORAL ONCE
Status: DISCONTINUED | OUTPATIENT
Start: 2025-06-20 | End: 2025-06-20

## 2025-06-20 RX ORDER — ERGOCALCIFEROL 1.25 MG/1
50000 CAPSULE, LIQUID FILLED ORAL WEEKLY
Status: DISCONTINUED | OUTPATIENT
Start: 2025-06-21 | End: 2025-06-21 | Stop reason: HOSPADM

## 2025-06-20 RX ORDER — HYDRALAZINE HYDROCHLORIDE 20 MG/ML
INJECTION INTRAMUSCULAR; INTRAVENOUS PRN
Status: COMPLETED | OUTPATIENT
Start: 2025-06-20 | End: 2025-06-20

## 2025-06-20 RX ORDER — HEPARIN SODIUM 1000 [USP'U]/ML
3200 INJECTION, SOLUTION INTRAVENOUS; SUBCUTANEOUS PRN
Status: DISCONTINUED | OUTPATIENT
Start: 2025-06-20 | End: 2025-06-21 | Stop reason: HOSPADM

## 2025-06-20 RX ORDER — LABETALOL HYDROCHLORIDE 5 MG/ML
10 INJECTION, SOLUTION INTRAVENOUS ONCE
Status: COMPLETED | OUTPATIENT
Start: 2025-06-20 | End: 2025-06-20

## 2025-06-20 RX ORDER — HYDRALAZINE HYDROCHLORIDE 20 MG/ML
10 INJECTION INTRAMUSCULAR; INTRAVENOUS ONCE
Status: COMPLETED | OUTPATIENT
Start: 2025-06-20 | End: 2025-06-20

## 2025-06-20 RX ORDER — LABETALOL 200 MG/1
200 TABLET, FILM COATED ORAL 2 TIMES DAILY
Qty: 60 TABLET | Refills: 0 | Status: SHIPPED | OUTPATIENT
Start: 2025-06-20

## 2025-06-20 RX ORDER — MIDAZOLAM HYDROCHLORIDE 1 MG/ML
INJECTION, SOLUTION INTRAMUSCULAR; INTRAVENOUS PRN
Status: COMPLETED | OUTPATIENT
Start: 2025-06-20 | End: 2025-06-20

## 2025-06-20 RX ORDER — HYDRALAZINE HYDROCHLORIDE 20 MG/ML
10 INJECTION INTRAMUSCULAR; INTRAVENOUS
Status: DISCONTINUED | OUTPATIENT
Start: 2025-06-20 | End: 2025-06-21 | Stop reason: HOSPADM

## 2025-06-20 RX ORDER — LOSARTAN POTASSIUM 100 MG/1
100 TABLET ORAL DAILY
Qty: 30 TABLET | Refills: 3 | Status: SHIPPED | OUTPATIENT
Start: 2025-06-21

## 2025-06-20 RX ORDER — FERROUS SULFATE 324(65)MG
324 TABLET, DELAYED RELEASE (ENTERIC COATED) ORAL
Status: DISCONTINUED | OUTPATIENT
Start: 2025-06-21 | End: 2025-06-21 | Stop reason: HOSPADM

## 2025-06-20 RX ORDER — FENTANYL CITRATE 50 UG/ML
INJECTION, SOLUTION INTRAMUSCULAR; INTRAVENOUS PRN
Status: COMPLETED | OUTPATIENT
Start: 2025-06-20 | End: 2025-06-20

## 2025-06-20 RX ADMIN — FENTANYL CITRATE 50 MCG: 50 INJECTION INTRAMUSCULAR; INTRAVENOUS at 13:05

## 2025-06-20 RX ADMIN — FENTANYL CITRATE 50 MCG: 50 INJECTION INTRAMUSCULAR; INTRAVENOUS at 13:02

## 2025-06-20 RX ADMIN — HYDRALAZINE HYDROCHLORIDE 10 MG: 20 INJECTION INTRAMUSCULAR; INTRAVENOUS at 12:59

## 2025-06-20 RX ADMIN — ATORVASTATIN CALCIUM 40 MG: 40 TABLET, FILM COATED ORAL at 21:42

## 2025-06-20 RX ADMIN — LABETALOL HYDROCHLORIDE 200 MG: 200 TABLET, FILM COATED ORAL at 07:20

## 2025-06-20 RX ADMIN — HYDRALAZINE HYDROCHLORIDE 10 MG: 20 INJECTION INTRAMUSCULAR; INTRAVENOUS at 23:33

## 2025-06-20 RX ADMIN — ACETAMINOPHEN 650 MG: 325 TABLET ORAL at 04:16

## 2025-06-20 RX ADMIN — LOSARTAN POTASSIUM 100 MG: 100 TABLET, FILM COATED ORAL at 07:20

## 2025-06-20 RX ADMIN — LABETALOL HYDROCHLORIDE 10 MG: 5 INJECTION INTRAVENOUS at 14:35

## 2025-06-20 RX ADMIN — SODIUM CHLORIDE: 0.9 INJECTION, SOLUTION INTRAVENOUS at 05:18

## 2025-06-20 RX ADMIN — PIPERACILLIN AND TAZOBACTAM 3375 MG: 3; .375 INJECTION, POWDER, LYOPHILIZED, FOR SOLUTION INTRAVENOUS at 05:20

## 2025-06-20 RX ADMIN — MIDAZOLAM 1 MG: 1 INJECTION INTRAMUSCULAR; INTRAVENOUS at 13:02

## 2025-06-20 RX ADMIN — HYDRALAZINE HYDROCHLORIDE 10 MG: 20 INJECTION INTRAMUSCULAR; INTRAVENOUS at 18:18

## 2025-06-20 RX ADMIN — SODIUM CHLORIDE, PRESERVATIVE FREE 10 ML: 5 INJECTION INTRAVENOUS at 21:42

## 2025-06-20 RX ADMIN — MIDAZOLAM 1 MG: 1 INJECTION INTRAMUSCULAR; INTRAVENOUS at 13:05

## 2025-06-20 RX ADMIN — LABETALOL HYDROCHLORIDE 200 MG: 200 TABLET, FILM COATED ORAL at 21:42

## 2025-06-20 RX ADMIN — HYDRALAZINE HYDROCHLORIDE 10 MG: 20 INJECTION INTRAMUSCULAR; INTRAVENOUS at 13:35

## 2025-06-20 RX ADMIN — CALCIUM ACETATE 1334 MG: 667 CAPSULE ORAL at 18:18

## 2025-06-20 RX ADMIN — INSULIN LISPRO 1 UNITS: 100 INJECTION, SOLUTION INTRAVENOUS; SUBCUTANEOUS at 21:42

## 2025-06-20 RX ADMIN — HYDRALAZINE HYDROCHLORIDE 10 MG: 20 INJECTION INTRAMUSCULAR; INTRAVENOUS at 12:53

## 2025-06-20 RX ADMIN — NIFEDIPINE 60 MG: 60 TABLET, FILM COATED, EXTENDED RELEASE ORAL at 21:42

## 2025-06-20 RX ADMIN — NIFEDIPINE 60 MG: 60 TABLET, FILM COATED, EXTENDED RELEASE ORAL at 07:20

## 2025-06-20 ASSESSMENT — PAIN SCALES - GENERAL
PAINLEVEL_OUTOF10: 8
PAINLEVEL_OUTOF10: 0

## 2025-06-20 ASSESSMENT — PAIN DESCRIPTION - DESCRIPTORS: DESCRIPTORS: ACHING

## 2025-06-20 ASSESSMENT — PAIN DESCRIPTION - ORIENTATION: ORIENTATION: RIGHT

## 2025-06-20 ASSESSMENT — PAIN DESCRIPTION - LOCATION: LOCATION: CHEST

## 2025-06-20 NOTE — PROGRESS NOTES
Patient went to HD this morning at shift change, vitals checked and charted, BP meds administered prior to transport off unit. Patient o be getting renal bx no later than 1030, HD RN Johana aware and will coordinate treatment times with IR RN Quinn. At 0955, this RN called down and checked on patient and remind about IR, patient's BP staying 130s systolic, patient in for transport to leave HD to IR.     Electronically signed by Sari Diaz RN on 6/20/2025 at 10:17 AM

## 2025-06-20 NOTE — PRE SEDATION
Sedation Pre-Procedure Note    Patient Name: Erinn Kulkarni   YOB: 1980  Room/Bed: [id number]-6496/5252-01  Medical Record Number: [id number]  Date: 6/20/2025   Time: 1:25 PM       Indication:  Random Renal Biopsy    Consent: I have discussed with the patient and/or the patient representative the indication, alternatives, and the possible risks and/or complications of the planned procedure and the anesthesia methods. The patient and/or patient representative appear to understand and agree to proceed.    Vital Signs:   Vitals:    06/20/25 1321   BP: 137/79   Pulse: 94   Resp: 16   Temp:    SpO2: 100%       Past Medical History:   has a past medical history of CKD (chronic kidney disease) stage 4, GFR 15-29 ml/min ([facility]).    Past Surgical History:   has a past surgical history that includes IR TUNNELED CVC PLACE WO SQ PORT/PUMP > 5 YEARS (6/16/2025).    Medications:   Scheduled Meds:    [START ON 6/21/2025] vitamin D  50,000 Units Oral Weekly    losartan  100 mg Oral Daily    NIFEdipine  60 mg Oral BID    insulin lispro  0-4 Units SubCUTAneous 4x Daily WC    labetalol  200 mg Oral BID    iron sucrose  200 mg IntraVENous Q24H    calcium acetate  2 capsule Oral TID WC    atorvastatin  40 mg Oral Nightly    sodium chloride flush  5-40 mL IntraVENous 2 times per day     Continuous Infusions:    dextrose      sodium chloride 5 mL/hr at 06/20/25 0518    sodium chloride       PRN Meds: heparin (porcine), hydrALAZINE, hydrALAZINE, fentanNYL, midazolam, ALPRAZolam, glucose, dextrose bolus **OR** dextrose bolus, glucagon (rDNA), dextrose, loperamide, sodium chloride flush, sodium chloride, ondansetron **OR** ondansetron, polyethylene glycol, acetaminophen **OR** acetaminophen, sodium chloride  Home Meds:   Prior to Admission medications    Medication Sig Start Date End Date Taking? Authorizing Provider   losartan (COZAAR) 100 MG tablet Take 1 tablet by mouth daily 6/21/25  Yes Lg Osman MD   labetalol

## 2025-06-20 NOTE — PROGRESS NOTES
Discharge medications are ready in inpatient pharmacy for weekend delivery.    06/20/25 2:58 PM

## 2025-06-20 NOTE — PLAN OF CARE
Problem: Discharge Planning  Goal: Discharge to home or other facility with appropriate resources  Outcome: Progressing     Problem: Pain  Goal: Verbalizes/displays adequate comfort level or baseline comfort level  Outcome: Progressing     Problem: Safety - Adult  Goal: Free from fall injury  Outcome: Progressing     Problem: ABCDS Injury Assessment  Goal: Absence of physical injury  Outcome: Progressing     Problem: Metabolic/Fluid and Electrolytes - Adult  Goal: Electrolytes maintained within normal limits  Outcome: Progressing     Problem: Metabolic/Fluid and Electrolytes - Adult  Goal: Hemodynamic stability and optimal renal function maintained  Outcome: Progressing     Problem: Hematologic - Adult  Goal: Maintains hematologic stability  Outcome: Progressing     Problem: Nutrition Deficit:  Goal: Optimize nutritional status  Outcome: Progressing

## 2025-06-20 NOTE — PROGRESS NOTES
Name:  Erinn Kulkarni /Age/Sex: 1980  (44 y.o. female)   MRN & CSN:  [id number] & [id number] Encounter Date/Time: 2025 2:19 PM EDT   Location:  E6O-6423/5252-01 PCP: Diego Licona MD     Attending:Lg Osman MD       Erinn Kulkarni is a 44 y.o. female with  has a past medical history of CKD (chronic kidney disease) stage 4, GFR 15-29 ml/min ([facility]). who presents with Acute renal failure superimposed on chronic kidney disease    Hospital Day: 8      Assessment and Plan     TELLY on CKD   Creatinine 20.5 on admission   UA with hematuria and proteinuria   Started on hemodialysis   Status post empiric IV Solu-Medrol   Nephrology following   Serology negative   Avoid nephrotoxic medication   Plan for kidney biopsy today    following her outpatient hemodialysis set up    Anemia   Status post blood transfusion   Iron studies with iron deficiency   Monitor with CBC in a.m.    Essential hypertension   Continue losartan, nifedipine, labetalol   Nephrology following    Hyperglycemia   Low dose sliding scale insulin for now   Monitor with fingerstick checks    Objective:       Intake/Output Summary (Last 24 hours) at 2025 1216  Last data filed at 2025 0630  Gross per 24 hour   Intake 340 ml   Output 180 ml   Net 160 ml      Vitals:   Vitals:    25 2030 25 2308 25 0433 25 0715   BP: (!) 157/99 (!) 148/95 132/80 (!) 150/89   Pulse:   92 95   Resp: 20   20   Temp: 98.1 °F (36.7 °C) 98.5 °F (36.9 °C) 98.1 °F (36.7 °C) 98.2 °F (36.8 °C)   TempSrc: Oral Oral Oral Oral   SpO2:   98%    Weight:   80.5 kg (177 lb 7.5 oz)    Height:             Interval history:     Seen and examined during hemodialysis. No acute events overnight.  Denies any active complaints, awaiting kidney biopsy later today    Physical Exam:        General: NAD  Eyes: EOMI  ENT: neck supple  Cardiovascular: Regular rate.  Respiratory: Clear to auscultation  Gastrointestinal: Soft,

## 2025-06-20 NOTE — PROGRESS NOTES
Nephrology Consult Note                                                                                                                                                                                                                                                                                                                                                               Office : [phone]     Fax :[phone]    Patient's Name: Erinn Kulkarni  6/20/2025    Reason for Consult:  TELLY   Requesting Physician:  Diego Licona MD  Chief Complaint:    Chief Complaint   Patient presents with    Hypertension     Patient sent from primary care office for high blood pressure. Patient was at appt for abd pain with significant weight loss over the last month.        Assessment/Plan       # TELLY on CKD 3/4    # HTN    # Anemia    # Acid- base/ Electrolyte imbalance     # MBD     Plan   - HD in am   - Ur studies - Hematuria and proteinuria   - r/o GN  - IV solumedrol x 3 doses - done   - serology neg   - renal biopsy - not done yet by radiology   - monitor vol status     - discussed with Sister     - HD unit arranged - [facility]     - Can be discharged once HD unit placement is done     Recommend to dose adjust all medications  based on renal functions  Maintain SBP> 90 mmHg   Daily weights   AVOID NSAIDs  Avoid Nephrotoxins  Monitor Intake/Output  Call if significant decrease in urine output      History of Present Ilness:    Erinn Kulkarni is a 44 y.o. female with pmh of hypertension, CKD stage IIIb/4 who presents with   Complaint of diarrhea  Onset of diarrhea since more than week  Per patient diarrhea is watery in nature not foul-smelling and denies any blood in diarrhea  Associated with mild abdominal cramps  Denies any fever or chills  Denies any nausea or vomiting  Denies shortness of breath or chest pain  She was suppose to see us next week but ended up here          Interval hx     Pt had

## 2025-06-20 NOTE — CARE COORDINATION
Chart review - it is noted that RPM was discussed with the patient by CM and the patient declined enrollment.    Joanna Hilario RN BSN  Care Transition Nurse  [phone]

## 2025-06-20 NOTE — PROGRESS NOTES
Up to BR throughout the night. Very low output noted. No loose stool noted. Medicated for complaints of pain. Blood pressure 132/80 at around 0430 this morning. Pre op checklist completed and linens changed. Safety precautions in place. Care ongoing.

## 2025-06-20 NOTE — CARE COORDINATION
Discharge Planning:    Patient has been clinically cleared with [facility] for OP HD. [facility] Pl[facility] Portal staff checking on whether or not patient can start with OP HD on Saturday, 6/21/2025; awaiting confirmation at this time. If patient unable to start HD OP tomorrow, patient will need to remain in hospital until after Saturday's dialysis treatment. CM to continue to follow for updates.    Electronically signed by ISABLE DAVID RN on 6/20/2025 at 8:55 AM    [facility] unable to start OP HD with patient until Tuesday, June 24, 2025.     [facility]: [facility]  First Treatment: 06/24/2025 @ 6:30 AM  Regular Treatment: T/T/S 7:00 AM    Electronically signed by ISABEL DAVID RN on 6/20/2025 at 1:34 PM

## 2025-06-20 NOTE — PLAN OF CARE
Problem: Discharge Planning  Goal: Discharge to home or other facility with appropriate resources  Outcome: Progressing     Problem: Pain  Goal: Verbalizes/displays adequate comfort level or baseline comfort level  Outcome: Progressing     Problem: Safety - Adult  Goal: Free from fall injury  Outcome: Progressing     Problem: ABCDS Injury Assessment  Goal: Absence of physical injury  Outcome: Progressing     Problem: Metabolic/Fluid and Electrolytes - Adult  Goal: Electrolytes maintained within normal limits  Outcome: Progressing  Goal: Hemodynamic stability and optimal renal function maintained  Outcome: Progressing     Problem: Hematologic - Adult  Goal: Maintains hematologic stability  Outcome: Progressing     Problem: Nutrition Deficit:  Goal: Optimize nutritional status  Outcome: Progressing

## 2025-06-20 NOTE — DISCHARGE INSTR - COC
Continuity of Care Form    Patient Name: Erinn Kulkarni   :  1980  MRN:  [id number]    Admit date:  2025  Discharge date:  2025    Code Status Order: Full Code   Advance Directives:    Date/Time Healthcare Directive Type of Healthcare Directive Copy in Chart Healthcare Agent Appointed Healthcare Agent's Name Healthcare Agent's Phone Number    25 0532 No, patient does not have an advance directive for healthcare treatment  --  --  --  --  --     25 1259 No, patient does not have an advance directive for healthcare treatment  --  --  --  --  --             Admitting Physician:  Rogelio Shah MD  PCP: Diego Licona MD    Discharging Nurse: Ivanna CAMP  Discharging Hospital Unit/Room#: [id number]-5937/5252-01  Discharging Unit Phone Number: [phone]    Emergency Contact:   Extended Emergency Contact Information  Primary Emergency Contact: MARJAN KULKARNI  Address: [address] [place] [address] [address] [place], [place] [address] [place] [address]  Home Phone: [phone]  Relation: Parent  Secondary Emergency Contact: Christa Kulkarni   [facility]  Home Phone: [phone]  Relation: Brother/Sister    Past Surgical History:  Past Surgical History:   Procedure Laterality Date    IR TUNNELED CVC PLACE WO SQ PORT/PUMP > 5 YEARS  2025    IR TUNNELED CVC PLACE WO SQ PORT/PUMP > 5 YEARS 2025 [facility]       Immunization History:   Immunization History   Administered Date(s) Administered    COVID-19, PFIZER PURPLE top, DILUTE for use, (age 12 y+), 30mcg/0.3mL 2021, 10/22/2021    Influenza, FLUARIX, FLULAVAL, FLUZONE (age 6 mo+) and AFLURIA, (age 3 y+), Quadv PF, 0.5mL 2018, 01/15/2020    Influenza, FLUBLOK, (age 18 y+), Quadv PF, 0.5mL 2021    Influenza, FLUCELVAX, (age 6 mo+), MDCK, Quadv PF, 0.5mL 2023    TDaP, ADACEL (age 10y-64y), BOOSTRIX (age 10y+), IM, 0.5mL 2018       Active Problems:  Patient Active Problem List

## 2025-06-20 NOTE — BRIEF OP NOTE
Brief Postoperative Note      Patient: Erinn Kulkarni  YOB: 1980  MRN: [id number]    Date of Procedure: 6/20/2025    Renal Dysfunction    Post-Op Diagnosis: Same       Random Renal Biopsy    Surgeon(s):  Estevan Cid MD    Assistant:  * No surgical staff found *    Anesthesia: Moderate Sedation    Estimated Blood Loss (mL): Minimal    Complications: None    Specimens:   4 x 18 G core biopsies      Findings:  Successful random biopsy of lower pole of left kidney    Electronically signed by Estevan Cid MD on 6/20/2025 at 1:26 PM

## 2025-06-20 NOTE — PROGRESS NOTES
Treatment time: 3 hrs    Net UF: 1000 ml    Pre weight: 80.5 kg  Post weight: 79.5 kg  EDW: TBD    Access used: Rt CW TDC  Access function: Well tolerated, 250 BFR    Medications or blood products given: none    Regular outpatient schedule: TBD    Summary of response to treatment: Pt tolerated well. Pt remained stable throughout entire treatment and upon exiting the hemodialysis suite. Report given to Sari Diaz RN

## 2025-06-21 VITALS
HEIGHT: 63 IN | DIASTOLIC BLOOD PRESSURE: 94 MMHG | SYSTOLIC BLOOD PRESSURE: 155 MMHG | BODY MASS INDEX: 30.59 KG/M2 | HEART RATE: 102 BPM | RESPIRATION RATE: 18 BRPM | OXYGEN SATURATION: 100 % | TEMPERATURE: 97.9 F | WEIGHT: 172.62 LBS

## 2025-06-21 LAB
ANION GAP SERPL CALCULATED.3IONS-SCNC: 14 MMOL/L (ref 3–16)
BUN SERPL-MCNC: 27 MG/DL (ref 7–20)
CALCIUM SERPL-MCNC: 8.7 MG/DL (ref 8.3–10.6)
CHLORIDE SERPL-SCNC: 102 MMOL/L (ref 99–110)
CO2 SERPL-SCNC: 21 MMOL/L (ref 21–32)
CREAT SERPL-MCNC: 7 MG/DL (ref 0.6–1.1)
DEPRECATED RDW RBC AUTO: 16.9 % (ref 12.4–15.4)
GFR SERPLBLD CREATININE-BSD FMLA CKD-EPI: 7 ML/MIN/{1.73_M2}
GLUCOSE BLD-MCNC: 104 MG/DL (ref 70–99)
GLUCOSE BLD-MCNC: 124 MG/DL (ref 70–99)
GLUCOSE SERPL-MCNC: 102 MG/DL (ref 70–99)
HCT VFR BLD AUTO: 25.4 % (ref 36–48)
HGB BLD-MCNC: 8.2 G/DL (ref 12–16)
MCH RBC QN AUTO: 27.8 PG (ref 26–34)
MCHC RBC AUTO-ENTMCNC: 32.4 G/DL (ref 31–36)
MCV RBC AUTO: 85.7 FL (ref 80–100)
PERFORMED ON: ABNORMAL
PERFORMED ON: ABNORMAL
PLATELET # BLD AUTO: 253 K/UL (ref 135–450)
PMV BLD AUTO: 9.9 FL (ref 5–10.5)
POTASSIUM SERPL-SCNC: 4.4 MMOL/L (ref 3.5–5.1)
RBC # BLD AUTO: 2.96 M/UL (ref 4–5.2)
SODIUM SERPL-SCNC: 137 MMOL/L (ref 136–145)
WBC # BLD AUTO: 10 K/UL (ref 4–11)

## 2025-06-21 PROCEDURE — 2500000003 HC RX 250 WO HCPCS: Performed by: INTERNAL MEDICINE

## 2025-06-21 PROCEDURE — 90935 HEMODIALYSIS ONE EVALUATION: CPT

## 2025-06-21 PROCEDURE — 6370000000 HC RX 637 (ALT 250 FOR IP)

## 2025-06-21 PROCEDURE — 6360000002 HC RX W HCPCS: Performed by: INTERNAL MEDICINE

## 2025-06-21 PROCEDURE — 80048 BASIC METABOLIC PNL TOTAL CA: CPT

## 2025-06-21 PROCEDURE — 85027 COMPLETE CBC AUTOMATED: CPT

## 2025-06-21 PROCEDURE — 36415 COLL VENOUS BLD VENIPUNCTURE: CPT

## 2025-06-21 PROCEDURE — 6370000000 HC RX 637 (ALT 250 FOR IP): Performed by: INTERNAL MEDICINE

## 2025-06-21 PROCEDURE — 94760 N-INVAS EAR/PLS OXIMETRY 1: CPT

## 2025-06-21 RX ORDER — FERROUS SULFATE 324(65)MG
324 TABLET, DELAYED RELEASE (ENTERIC COATED) ORAL
Qty: 30 TABLET | Refills: 0 | Status: SHIPPED | OUTPATIENT
Start: 2025-06-22

## 2025-06-21 RX ADMIN — NIFEDIPINE 60 MG: 60 TABLET, FILM COATED, EXTENDED RELEASE ORAL at 12:28

## 2025-06-21 RX ADMIN — EPOETIN ALFA-EPBX 5000 UNITS: 10000 INJECTION, SOLUTION INTRAVENOUS; SUBCUTANEOUS at 11:19

## 2025-06-21 RX ADMIN — LOSARTAN POTASSIUM 100 MG: 100 TABLET, FILM COATED ORAL at 12:28

## 2025-06-21 RX ADMIN — LABETALOL HYDROCHLORIDE 200 MG: 200 TABLET, FILM COATED ORAL at 12:28

## 2025-06-21 RX ADMIN — ERGOCALCIFEROL 50000 UNITS: 1.25 CAPSULE ORAL at 12:28

## 2025-06-21 RX ADMIN — FERROUS SULFATE TAB EC 324 MG (65 MG FE EQUIVALENT) 324 MG: 324 (65 FE) TABLET DELAYED RESPONSE at 12:28

## 2025-06-21 RX ADMIN — CALCIUM ACETATE 1334 MG: 667 CAPSULE ORAL at 12:39

## 2025-06-21 NOTE — CARE COORDINATION
Case Management Discharge Note          Date / Time of Note: 6/21/2025 2:42 PM                  Patient Name: Erinn Kulkarni   YOB: 1980  Diagnosis: Enteritis [K52.9]  Acute pulmonary edema (HCC) [J81.0]  Acute renal failure (ARF) [N17.9]  Acute renal failure superimposed on chronic kidney disease, unspecified acute renal failure type, unspecified CKD stage [N17.9, N18.9]   Date / Time: 6/13/2025  2:11 PM    Financial:  Payor: [employer] MEDICARE / [facility] / Product Type: Medicare /      Pharmacy:    [facility] [id number] [place], [place] - [address] [facility] - P [phone] - F [phone]  [address] [facility] [address]  Phone: [phone] Fax: [phone]      Assistance purchasing medications?: Potential Assistance Purchasing Medications: No  Assistance provided by Case Management: None at this time    DISCHARGE Disposition: Home- No Services Needed    Dialysis:  Dialysis patient: Yes    Dialysis Center:  Dialysis Facility  Name:  [facility]  Address:  [address] [place] [id number]  Dialysis Schedule:  First Treatment: 06/24/2025 @ 6:30 AM  Regular Treatment: T/T/S 7:00 AM  Phone:   [phone]  Fax: [phone]  TRANSPORTATION: Confirmed with patient and sister, sister to provide transportation for dialysis.   Called and notified Miriam grant [facility] of patient to discharge today,verified start date of 6/24 @ 6:30am, and verified fax number.   AVS sent via [employer].     Transportation:  Transportation PLAN for discharge: family   Mode of Transport: Private Car    Time of Transport: sister at bedside to transport      IMM Completed:   Yes, Case management has presented and reviewed IMM letter #2.       IMM Letter date given:: 06/19/25  IMM Letter time given:: 1214.   Patient and/or family/POA verbalized understanding of their medicare rights and appeal process if needed. Patient and/or family/POA has signed,

## 2025-06-21 NOTE — PROGRESS NOTES
Treatment time: 3 hrs    Net UF: 1000 ml    Pre weight: 79.3 kg  Post weight: 78.3 kg  EDW: TBD    Access used: Rt CW TDC  Access function: Well tolerated, 250 BFR    Medications or blood products given: Retacrit     Regular outpatient schedule: TTS    Summary of response to treatment: Pt tolerated well. Pt remained stable throughout entire treatment and upon exiting the hemodialysis suite. Report given to vIanna Jones RN

## 2025-06-21 NOTE — PROGRESS NOTES
Pt discharged at this time. Reviewed all discharge instructions and follow up appts with patient. Patient states that she understands. Ivs and tele removed at this time without complications. Pt does not voice any questions or concerns at this time. Belongings in hand. Medications received from pharmacy at time of discharge. Patient assisted to vehicle at this time. Patient tolerated well.

## 2025-06-21 NOTE — PROGRESS NOTES
Nephrology Consult Note                                                                                                                                                                                                                                                                                                                                                               Office : [phone]     Fax :[phone]    Patient's Name: Erinn Kulkarni  6/20/2025    Reason for Consult:  TELLY   Requesting Physician:  Diego Licona MD  Chief Complaint:    Chief Complaint   Patient presents with    Hypertension     Patient sent from primary care office for high blood pressure. Patient was at appt for abd pain with significant weight loss over the last month.        Assessment/Plan       # TELLY on CKD 3/4    # HTN    # Anemia    # Acid- base/ Electrolyte imbalance     # MBD     Plan   - HD today and in am if not discharged   - Ur studies - Hematuria and proteinuria   - r/o GN  - IV solumedrol x 3 doses - done   - serology neg   - renal biopsy - today   - monitor vol status     - discussed with Sister     [facility]     - Can be discharged once HD unit placement is done     Recommend to dose adjust all medications  based on renal functions  Maintain SBP> 90 mmHg   Daily weights   AVOID NSAIDs  Avoid Nephrotoxins  Monitor Intake/Output  Call if significant decrease in urine output      History of Present Ilness:    Erinn Kulkarni is a 44 y.o. female with pmh of hypertension, CKD stage IIIb/4 who presents with   Complaint of diarrhea  Onset of diarrhea since more than week  Per patient diarrhea is watery in nature not foul-smelling and denies any blood in diarrhea  Associated with mild abdominal cramps  Denies any fever or chills  Denies any nausea or vomiting  Denies shortness of breath or chest pain  She was suppose to see us next week but ended up here          Interval hx

## 2025-06-21 NOTE — PLAN OF CARE
Problem: Discharge Planning  Goal: Discharge to home or other facility with appropriate resources  6/20/2025 2302 by Anatoly Areco RN  Outcome: Progressing  Flowsheets (Taken 6/20/2025 2131)  Discharge to home or other facility with appropriate resources: Identify barriers to discharge with patient and caregiver     Problem: Pain  Goal: Verbalizes/displays adequate comfort level or baseline comfort level  6/20/2025 2302 by Anatoly Arceo RN  Outcome: Progressing     Problem: Safety - Adult  Goal: Free from fall injury  6/20/2025 2302 by Anatoly Arceo RN  Outcome: Progressing     Problem: ABCDS Injury Assessment  Goal: Absence of physical injury  6/20/2025 2302 by Anatoly Arceo RN  Outcome: Progressing     Problem: Metabolic/Fluid and Electrolytes - Adult  Goal: Electrolytes maintained within normal limits  6/20/2025 2302 by Anatoly Arceo RN  Outcome: Progressing  Flowsheets (Taken 6/20/2025 2131)  Electrolytes maintained within normal limits: Monitor labs and assess patient for signs and symptoms of electrolyte imbalances     Problem: Metabolic/Fluid and Electrolytes - Adult  Goal: Hemodynamic stability and optimal renal function maintained  6/20/2025 2302 by Anatoly Arceo RN  Outcome: Progressing  Flowsheets (Taken 6/20/2025 2131)  Hemodynamic stability and optimal renal function maintained: Monitor labs and assess for signs and symptoms of volume excess or deficit     Problem: Hematologic - Adult  Goal: Maintains hematologic stability  6/20/2025 2302 by Anatoly Arceo RN  Outcome: Progressing  Flowsheets (Taken 6/20/2025 2131)  Maintains hematologic stability: Assess for signs and symptoms of bleeding or hemorrhage

## 2025-06-23 ENCOUNTER — CARE COORDINATION (OUTPATIENT)
Dept: CASE MANAGEMENT | Age: 45
End: 2025-06-23

## 2025-06-23 DIAGNOSIS — N17.9 AKI (ACUTE KIDNEY INJURY): Primary | ICD-10-CM

## 2025-06-23 PROCEDURE — 1111F DSCHRG MED/CURRENT MED MERGE: CPT | Performed by: INTERNAL MEDICINE

## 2025-06-23 NOTE — CARE COORDINATION
Care Transitions Note    Initial Call - Call within 2 business days of discharge: Yes    Patient Current Location:  Home: [address] [place] [address] [place] [address]    Care Transition Nurse contacted the patient by telephone to perform post hospital discharge assessment, verified name and  as identifiers.  Provided introduction to self, and explanation of the Care Transition Nurse role.    Patient: Erinn Kulkarni    Patient : 1980   MRN: [id number]      Reason for Admission: TELLY, ESRD with HD, new diagnosis   Discharge Date: 25     RURS: Readmission Risk Score: 15      Last Discharge Facility       Date Complaint Diagnosis Description Type Department Provider    25 Hypertension Acute renal failure superimposed on chronic kidney disease, unspecified acute renal failure type, unspecified CKD stage ... ED to Hosp-Admission (Discharged) (ADMITTED) Lg Alexander MD; Emiliano Ybarra...            Was this an external facility discharge? No    Additional needs identified to be addressed with provider   No needs identified             Method of communication with provider: none.    Patients top risk factors for readmission: medical condition-TELLY, ESRD with HD, new diagnosis    Interventions to address risk factors:   Education: patient instructed to continue to monitor for any worsening/ongoing diarrhea, any fevers or chills, reporting to MD immediately.   Review of patient management of conditions/medications: make sure to rest as needed, taking all medications as prescribed, monitor VSS closely, make sure to keep all HD chair times as scheduled.    Care Summary Note: CTN spoke with patient this am for initial 24 hour discharge follow up CTN call.  Patient states she is doing okay, still having lot's of diarrhea x's 2 episodes this morning.  No reports of any fever chills, nausea, vomiting, chest pain, SOB or cough.   Patient with no congestion, pain, difficulty emptying bladder, LE

## 2025-06-23 NOTE — CARE COORDINATION
Care Transitions Note    Initial Call - Call within 2 business days of discharge: Yes    Attempted to reach patient for transitions of care follow up. Unable to reach patient.    Outreach Attempts:   1ST CTC attempt to reach Pt regarding recent hospital discharge.  CTC left voice recording with call back number requesting a call back. Will attempt to reach patient again.    Patient: Erinn Kulkarni    Patient : 1980   MRN: [id number]      Reason for Admission: SKI, ESRD with HD, new diagnosis  Discharge Date: 25     RURS: Readmission Risk Score: 15    Last Discharge Facility       Date Complaint Diagnosis Description Type Department Provider    25 Hypertension Acute renal failure superimposed on chronic kidney disease, unspecified acute renal failure type, unspecified CKD stage ... ED to Hosp-Admission (Discharged) (ADMITTED) Lg Alexander MD; Viry Ybarra..            Was this an external facility discharge? No    Follow Up Appointment:   Patient has hospital follow up appointment scheduled within 7 days of discharge.    Future Appointments         Provider Specialty Dept Phone    2025 10:00 AM Diego Licona MD Primary Care [phone]    7/3/2025 1:45 PM Bruno Villar MD Nephrology [phone]            Plan for follow-up on next business day.      Thank You,    Amanda Sutherland RN  Care Transition Coordinator  Contact Number:[phone]

## 2025-06-24 ENCOUNTER — TELEPHONE (OUTPATIENT)
Dept: PRIMARY CARE CLINIC | Age: 45
End: 2025-06-24

## 2025-06-24 NOTE — PROGRESS NOTES
Biopsy            Past Medical History:   Diagnosis Date    CKD (chronic kidney disease) stage 4, GFR 15-29 ml/min ([facility]) 6/14/2025       Past Surgical History:   Procedure Laterality Date    CT BIOPSY RENAL  6/20/2025    CT BIOPSY RENAL 6/20/2025 [facility] CT    IR TUNNELED CVC PLACE WO SQ PORT/PUMP > 5 YEARS  6/16/2025    IR TUNNELED CVC PLACE WO SQ PORT/PUMP > 5 YEARS 6/16/2025 [facility]       Family History   Problem Relation Age of Onset    Cancer Father     High Blood Pressure Father     Cancer Sister         breast cancer    Cancer Brother         reports that she has never smoked. She has never used smokeless tobacco. She reports that she does not drink alcohol and does not use drugs.    Allergies:  Patient has no known allergies.    Current Medications:    No current facility-administered medications for this encounter.      Review of Systems:   14 point ROS obtained but were negative except mentioned in HPI      Physical exam:     Vitals:  BP (!) 155/94   Pulse (!) 102   Temp 97.9 °F (36.6 °C) (Oral)   Resp 18   Ht 1.6 m (5' 2.99\")   Wt 78.3 kg (172 lb 9.9 oz)   LMP 06/10/2025 (Exact Date)   SpO2 100%   BMI 30.59 kg/m²   Constitutional:  OAA X3 NAD Yes  Skin: no rash, turgor wnl  Heent:  eomi, mmm  Neck: no bruits or jvd noted  Cardiovascular:  S1, S2 without m/r/g  Respiratory: CTA B without w/r/r  Abdomen:  , soft, nt, nd  Ext:  lower extremity edema Yes  Psychiatric: mood and affect flat   Musculoskeletal:  Rom, muscular strength intact    Data:   Labs:  CBC:   Recent Labs     06/21/25  0605   WBC 10.0   HGB 8.2*        BMP:    Recent Labs     06/21/25  0605      K 4.4      CO2 21   BUN 27*   CREATININE 7.0*   GLUCOSE 102*     Ca/Mg/Phos:   Recent Labs     06/21/25  0605   CALCIUM 8.7     Hepatic:   No results for input(s): \"AST\", \"ALT\", \"BILITOT\", \"ALKPHOS\" in the last 72 hours.    Invalid input(s): \"ALB\"    Troponin: No results for input(s): \"TROPONINI\" in the last

## 2025-06-28 NOTE — DISCHARGE SUMMARY
Name:  Erinn Kulkarni /Age/Sex: 1980 (44 y.o. female)   Admit Date: 2025  Discharge Date: 2025    MRN & CSN:  [id number] & [id number] Encounter Date : 2025    Attending:  No att. providers found Discharging Provider: Lg Osman MD     Hospital Course:      Erinn Kulkarni is a 44 y.o. female who presented with     Chief Complaint   Patient presents with    Hypertension     Patient sent from primary care office for high blood pressure. Patient was at appt for abd pain with significant weight loss over the last month.         The patient was being managed in the hospital for    TELLY on CKD              Creatinine 20.5 on admission              UA with hematuria and proteinuria              Started on hemodialysis per nephrology              Status post empiric IV Solu-Medrol              Nephrology following              Serology negative              Avoid nephrotoxic medication              Received kidney biopsy while inpatient and will need to follow-up with nephrology for results as an outpatient               following her outpatient hemodialysis set up     Anemia              Status post blood transfusion              Iron studies with iron deficiency, started on oral iron replacement plan will need to be follow-up with nephrology              Monitored with CBC in a.m.     Essential hypertension              Antihypertensive medications adjusted and was started on losartan on discharge    On the basis of discharge, patient reported feeling stable. The patient was found to not be in any acute distress. Further, the patient expressed appropriate understanding of, and agreement with, the discharge recommendations, medications and plan.     Follow up appointments :  Primary care physician: Diego Licona MD within 1 week, nephrology in 1 week    Consults this admission:  IP CONSULT TO NEPHROLOGY  IP CONSULT TO SOCIAL WORK  IP CONSULT TO DIETITIAN  IP CONSULT

## 2025-07-03 ENCOUNTER — TELEPHONE (OUTPATIENT)
Dept: BARIATRICS/WEIGHT MGMT | Age: 45
End: 2025-07-03

## 2025-07-03 NOTE — TELEPHONE ENCOUNTER
Please call patient to schedule appt.  Has a referral in [facility] from kidney doctor.  [phone]

## 2025-07-06 ENCOUNTER — APPOINTMENT (OUTPATIENT)
Dept: CT IMAGING | Age: 45
DRG: 304 | End: 2025-07-06
Payer: MEDICARE

## 2025-07-06 ENCOUNTER — HOSPITAL ENCOUNTER (INPATIENT)
Age: 45
LOS: 4 days | Discharge: HOME | DRG: 304 | End: 2025-07-11
Attending: EMERGENCY MEDICINE
Payer: MEDICARE

## 2025-07-06 DIAGNOSIS — N18.6 ESRD (END STAGE RENAL DISEASE) (HCC): ICD-10-CM

## 2025-07-06 DIAGNOSIS — R11.2 NAUSEA AND VOMITING, UNSPECIFIED VOMITING TYPE: Primary | ICD-10-CM

## 2025-07-06 DIAGNOSIS — I16.0 HYPERTENSIVE URGENCY: ICD-10-CM

## 2025-07-06 PROBLEM — K29.70 GASTRITIS: Status: ACTIVE | Noted: 2025-07-06

## 2025-07-06 LAB
ALBUMIN SERPL-MCNC: 4.1 G/DL (ref 3.4–5)
ALBUMIN/GLOB SERPL: 1.4 {RATIO} (ref 1.1–2.2)
ALP SERPL-CCNC: 100 U/L (ref 40–129)
ALT SERPL-CCNC: 16 U/L (ref 10–40)
ANION GAP SERPL CALCULATED.3IONS-SCNC: 15 MMOL/L (ref 3–16)
AST SERPL-CCNC: 21 U/L (ref 15–37)
BACTERIA URNS QL MICRO: ABNORMAL /HPF
BASOPHILS # BLD: 0.1 K/UL (ref 0–0.2)
BASOPHILS NFR BLD: 2.1 %
BILIRUB SERPL-MCNC: <0.2 MG/DL (ref 0–1)
BILIRUB UR QL STRIP.AUTO: NEGATIVE
BUN SERPL-MCNC: 16 MG/DL (ref 7–20)
CALCIUM SERPL-MCNC: 9.7 MG/DL (ref 8.3–10.6)
CHLORIDE SERPL-SCNC: 99 MMOL/L (ref 99–110)
CLARITY UR: ABNORMAL
CO2 SERPL-SCNC: 24 MMOL/L (ref 21–32)
COLOR UR: YELLOW
CREAT SERPL-MCNC: 6.3 MG/DL (ref 0.6–1.1)
DEPRECATED RDW RBC AUTO: 17.9 % (ref 12.4–15.4)
EOSINOPHIL # BLD: 0.2 K/UL (ref 0–0.6)
EOSINOPHIL NFR BLD: 2.3 %
EPI CELLS #/AREA URNS AUTO: 12 /HPF (ref 0–5)
GFR SERPLBLD CREATININE-BSD FMLA CKD-EPI: 8 ML/MIN/{1.73_M2}
GLUCOSE SERPL-MCNC: 99 MG/DL (ref 70–99)
GLUCOSE UR STRIP.AUTO-MCNC: NEGATIVE MG/DL
HCG SERPL QL: NEGATIVE
HCT VFR BLD AUTO: 27.9 % (ref 36–48)
HGB BLD-MCNC: 9.1 G/DL (ref 12–16)
HGB UR QL STRIP.AUTO: ABNORMAL
HYALINE CASTS #/AREA URNS AUTO: 4 /LPF (ref 0–8)
KETONES UR STRIP.AUTO-MCNC: NEGATIVE MG/DL
LEUKOCYTE ESTERASE UR QL STRIP.AUTO: ABNORMAL
LIPASE SERPL-CCNC: 37 U/L (ref 13–60)
LYMPHOCYTES # BLD: 1.8 K/UL (ref 1–5.1)
LYMPHOCYTES NFR BLD: 25.8 %
MCH RBC QN AUTO: 27.7 PG (ref 26–34)
MCHC RBC AUTO-ENTMCNC: 32.6 G/DL (ref 31–36)
MCV RBC AUTO: 84.9 FL (ref 80–100)
MONOCYTES # BLD: 0.7 K/UL (ref 0–1.3)
MONOCYTES NFR BLD: 10.6 %
NEUTROPHILS # BLD: 4.1 K/UL (ref 1.7–7.7)
NEUTROPHILS NFR BLD: 59.2 %
NITRITE UR QL STRIP.AUTO: NEGATIVE
PH UR STRIP.AUTO: 8.5 [PH] (ref 5–8)
PLATELET # BLD AUTO: 359 K/UL (ref 135–450)
PMV BLD AUTO: 9.5 FL (ref 5–10.5)
POTASSIUM SERPL-SCNC: 4 MMOL/L (ref 3.5–5.1)
PROT SERPL-MCNC: 7 G/DL (ref 6.4–8.2)
PROT UR STRIP.AUTO-MCNC: 300 MG/DL
RBC # BLD AUTO: 3.29 M/UL (ref 4–5.2)
RBC CLUMPS #/AREA URNS AUTO: 1 /HPF (ref 0–4)
SODIUM SERPL-SCNC: 138 MMOL/L (ref 136–145)
SP GR UR STRIP.AUTO: 1.01 (ref 1–1.03)
UA COMPLETE W REFLEX CULTURE PNL UR: YES
UA DIPSTICK W REFLEX MICRO PNL UR: YES
URN SPEC COLLECT METH UR: ABNORMAL
UROBILINOGEN UR STRIP-ACNC: 0.2 E.U./DL
WBC # BLD AUTO: 6.9 K/UL (ref 4–11)
WBC #/AREA URNS AUTO: 14 /HPF (ref 0–5)

## 2025-07-06 PROCEDURE — 2500000003 HC RX 250 WO HCPCS: Performed by: HOSPITALIST

## 2025-07-06 PROCEDURE — 85025 COMPLETE CBC W/AUTO DIFF WBC: CPT

## 2025-07-06 PROCEDURE — 83690 ASSAY OF LIPASE: CPT

## 2025-07-06 PROCEDURE — G0378 HOSPITAL OBSERVATION PER HR: HCPCS

## 2025-07-06 PROCEDURE — 99285 EMERGENCY DEPT VISIT HI MDM: CPT

## 2025-07-06 PROCEDURE — 96375 TX/PRO/DX INJ NEW DRUG ADDON: CPT

## 2025-07-06 PROCEDURE — 87077 CULTURE AEROBIC IDENTIFY: CPT

## 2025-07-06 PROCEDURE — 6370000000 HC RX 637 (ALT 250 FOR IP): Performed by: HOSPITALIST

## 2025-07-06 PROCEDURE — 84703 CHORIONIC GONADOTROPIN ASSAY: CPT

## 2025-07-06 PROCEDURE — 96374 THER/PROPH/DIAG INJ IV PUSH: CPT

## 2025-07-06 PROCEDURE — 6360000002 HC RX W HCPCS: Performed by: PHYSICIAN ASSISTANT

## 2025-07-06 PROCEDURE — 6370000000 HC RX 637 (ALT 250 FOR IP): Performed by: PHYSICIAN ASSISTANT

## 2025-07-06 PROCEDURE — 36415 COLL VENOUS BLD VENIPUNCTURE: CPT

## 2025-07-06 PROCEDURE — 6360000002 HC RX W HCPCS: Performed by: HOSPITALIST

## 2025-07-06 PROCEDURE — 96366 THER/PROPH/DIAG IV INF ADDON: CPT

## 2025-07-06 PROCEDURE — 81001 URINALYSIS AUTO W/SCOPE: CPT

## 2025-07-06 PROCEDURE — 74176 CT ABD & PELVIS W/O CONTRAST: CPT

## 2025-07-06 PROCEDURE — 80053 COMPREHEN METABOLIC PANEL: CPT

## 2025-07-06 PROCEDURE — 96372 THER/PROPH/DIAG INJ SC/IM: CPT

## 2025-07-06 PROCEDURE — 96365 THER/PROPH/DIAG IV INF INIT: CPT

## 2025-07-06 PROCEDURE — 87086 URINE CULTURE/COLONY COUNT: CPT

## 2025-07-06 RX ORDER — SODIUM CHLORIDE 0.9 % (FLUSH) 0.9 %
5-40 SYRINGE (ML) INJECTION PRN
Status: DISCONTINUED | OUTPATIENT
Start: 2025-07-06 | End: 2025-07-11 | Stop reason: HOSPADM

## 2025-07-06 RX ORDER — NIFEDIPINE 30 MG/1
60 TABLET, EXTENDED RELEASE ORAL ONCE
Status: CANCELLED | OUTPATIENT
Start: 2025-07-06

## 2025-07-06 RX ORDER — ACETAMINOPHEN 325 MG/1
650 TABLET ORAL ONCE
Status: COMPLETED | OUTPATIENT
Start: 2025-07-06 | End: 2025-07-06

## 2025-07-06 RX ORDER — HEPARIN SODIUM 5000 [USP'U]/ML
5000 INJECTION, SOLUTION INTRAVENOUS; SUBCUTANEOUS EVERY 8 HOURS SCHEDULED
Status: DISCONTINUED | OUTPATIENT
Start: 2025-07-06 | End: 2025-07-11 | Stop reason: HOSPADM

## 2025-07-06 RX ORDER — ACETAMINOPHEN 650 MG/1
650 SUPPOSITORY RECTAL EVERY 6 HOURS PRN
Status: DISCONTINUED | OUTPATIENT
Start: 2025-07-06 | End: 2025-07-11 | Stop reason: HOSPADM

## 2025-07-06 RX ORDER — FERROUS SULFATE 324(65)MG
324 TABLET, DELAYED RELEASE (ENTERIC COATED) ORAL
Status: DISCONTINUED | OUTPATIENT
Start: 2025-07-07 | End: 2025-07-11 | Stop reason: HOSPADM

## 2025-07-06 RX ORDER — ONDANSETRON 4 MG/1
4 TABLET, ORALLY DISINTEGRATING ORAL EVERY 8 HOURS PRN
Status: DISCONTINUED | OUTPATIENT
Start: 2025-07-06 | End: 2025-07-11 | Stop reason: HOSPADM

## 2025-07-06 RX ORDER — HYDRALAZINE HYDROCHLORIDE 25 MG/1
50 TABLET, FILM COATED ORAL ONCE
Status: COMPLETED | OUTPATIENT
Start: 2025-07-06 | End: 2025-07-06

## 2025-07-06 RX ORDER — ONDANSETRON 2 MG/ML
4 INJECTION INTRAMUSCULAR; INTRAVENOUS ONCE
Status: COMPLETED | OUTPATIENT
Start: 2025-07-06 | End: 2025-07-06

## 2025-07-06 RX ORDER — IOPAMIDOL 755 MG/ML
75 INJECTION, SOLUTION INTRAVASCULAR
Status: DISCONTINUED | OUTPATIENT
Start: 2025-07-06 | End: 2025-07-11 | Stop reason: HOSPADM

## 2025-07-06 RX ORDER — LOSARTAN POTASSIUM 100 MG/1
100 TABLET ORAL DAILY
Status: DISCONTINUED | OUTPATIENT
Start: 2025-07-07 | End: 2025-07-11 | Stop reason: HOSPADM

## 2025-07-06 RX ORDER — ACETAMINOPHEN 325 MG/1
650 TABLET ORAL EVERY 6 HOURS PRN
Status: DISCONTINUED | OUTPATIENT
Start: 2025-07-06 | End: 2025-07-11 | Stop reason: HOSPADM

## 2025-07-06 RX ORDER — ATORVASTATIN CALCIUM 40 MG/1
40 TABLET, FILM COATED ORAL DAILY
Status: DISCONTINUED | OUTPATIENT
Start: 2025-07-07 | End: 2025-07-11 | Stop reason: HOSPADM

## 2025-07-06 RX ORDER — HYDRALAZINE HYDROCHLORIDE 25 MG/1
50 TABLET, FILM COATED ORAL 2 TIMES DAILY
Status: CANCELLED | OUTPATIENT
Start: 2025-07-07

## 2025-07-06 RX ORDER — NICARDIPINE HYDROCHLORIDE 0.1 MG/ML
.5-15 INJECTION INTRAVENOUS CONTINUOUS
Status: DISCONTINUED | OUTPATIENT
Start: 2025-07-06 | End: 2025-07-08

## 2025-07-06 RX ORDER — POLYETHYLENE GLYCOL 3350 17 G/17G
17 POWDER, FOR SOLUTION ORAL DAILY PRN
Status: DISCONTINUED | OUTPATIENT
Start: 2025-07-06 | End: 2025-07-11 | Stop reason: HOSPADM

## 2025-07-06 RX ORDER — OXYCODONE HYDROCHLORIDE 5 MG/1
5 TABLET ORAL ONCE
Refills: 0 | Status: COMPLETED | OUTPATIENT
Start: 2025-07-06 | End: 2025-07-06

## 2025-07-06 RX ORDER — LABETALOL HYDROCHLORIDE 5 MG/ML
10 INJECTION, SOLUTION INTRAVENOUS ONCE
Status: COMPLETED | OUTPATIENT
Start: 2025-07-06 | End: 2025-07-06

## 2025-07-06 RX ORDER — SODIUM CHLORIDE 9 MG/ML
INJECTION, SOLUTION INTRAVENOUS PRN
Status: DISCONTINUED | OUTPATIENT
Start: 2025-07-06 | End: 2025-07-11 | Stop reason: HOSPADM

## 2025-07-06 RX ORDER — LABETALOL 200 MG/1
200 TABLET, FILM COATED ORAL 2 TIMES DAILY
Status: DISCONTINUED | OUTPATIENT
Start: 2025-07-06 | End: 2025-07-11 | Stop reason: HOSPADM

## 2025-07-06 RX ORDER — SODIUM CHLORIDE 0.9 % (FLUSH) 0.9 %
5-40 SYRINGE (ML) INJECTION EVERY 12 HOURS SCHEDULED
Status: DISCONTINUED | OUTPATIENT
Start: 2025-07-06 | End: 2025-07-11 | Stop reason: HOSPADM

## 2025-07-06 RX ORDER — ONDANSETRON 2 MG/ML
4 INJECTION INTRAMUSCULAR; INTRAVENOUS EVERY 6 HOURS PRN
Status: DISCONTINUED | OUTPATIENT
Start: 2025-07-06 | End: 2025-07-11 | Stop reason: HOSPADM

## 2025-07-06 RX ADMIN — LABETALOL HYDROCHLORIDE 10 MG: 5 INJECTION INTRAVENOUS at 19:45

## 2025-07-06 RX ADMIN — HEPARIN SODIUM 5000 UNITS: 5000 INJECTION INTRAVENOUS; SUBCUTANEOUS at 22:23

## 2025-07-06 RX ADMIN — NICARDIPINE HYDROCHLORIDE 5 MG/HR: 0.1 INJECTION INTRAVENOUS at 22:26

## 2025-07-06 RX ADMIN — SODIUM CHLORIDE, PRESERVATIVE FREE 10 ML: 5 INJECTION INTRAVENOUS at 22:30

## 2025-07-06 RX ADMIN — OXYCODONE 5 MG: 5 TABLET ORAL at 16:53

## 2025-07-06 RX ADMIN — HYDRALAZINE HYDROCHLORIDE 50 MG: 25 TABLET ORAL at 16:55

## 2025-07-06 RX ADMIN — ONDANSETRON 4 MG: 2 INJECTION, SOLUTION INTRAMUSCULAR; INTRAVENOUS at 16:51

## 2025-07-06 RX ADMIN — LABETALOL HYDROCHLORIDE 200 MG: 200 TABLET, FILM COATED ORAL at 22:23

## 2025-07-06 RX ADMIN — ACETAMINOPHEN 650 MG: 325 TABLET ORAL at 16:53

## 2025-07-06 ASSESSMENT — PAIN DESCRIPTION - LOCATION: LOCATION: ABDOMEN

## 2025-07-06 ASSESSMENT — PAIN SCALES - GENERAL
PAINLEVEL_OUTOF10: 0
PAINLEVEL_OUTOF10: 0
PAINLEVEL_OUTOF10: 8

## 2025-07-06 ASSESSMENT — PAIN DESCRIPTION - ORIENTATION: ORIENTATION: LOWER

## 2025-07-06 NOTE — ED TRIAGE NOTES
.Erinn Kulkarni is a 44 y.o. female brought herself to the ER for eval of lower abd pain that started this morning across her lower abd 8/10. The patient states that she has also been nauseous and vomited this morning. The patient is also complaining of bilateral lower leg swelling. The patient is a dialysis patient that had her last full dialysis sat. The patient is alert and oriented with an open and patent airway with a normal respiratory effort.

## 2025-07-07 LAB
ANION GAP SERPL CALCULATED.3IONS-SCNC: 13 MMOL/L (ref 3–16)
BACTERIA UR CULT: ABNORMAL
BACTERIA UR CULT: ABNORMAL
BASOPHILS # BLD: 0.1 K/UL (ref 0–0.2)
BASOPHILS NFR BLD: 1 %
BUN SERPL-MCNC: 17 MG/DL (ref 7–20)
CALCIUM SERPL-MCNC: 9.6 MG/DL (ref 8.3–10.6)
CHLORIDE SERPL-SCNC: 101 MMOL/L (ref 99–110)
CK SERPL-CCNC: 52 U/L (ref 26–192)
CO2 SERPL-SCNC: 24 MMOL/L (ref 21–32)
CREAT SERPL-MCNC: 7.6 MG/DL (ref 0.6–1.1)
DEPRECATED RDW RBC AUTO: 18.4 % (ref 12.4–15.4)
EOSINOPHIL # BLD: 0.1 K/UL (ref 0–0.6)
EOSINOPHIL NFR BLD: 2.3 %
GFR SERPLBLD CREATININE-BSD FMLA CKD-EPI: 6 ML/MIN/{1.73_M2}
GLUCOSE SERPL-MCNC: 106 MG/DL (ref 70–99)
HCT VFR BLD AUTO: 25.9 % (ref 36–48)
HGB BLD-MCNC: 8.5 G/DL (ref 12–16)
LYMPHOCYTES # BLD: 1.6 K/UL (ref 1–5.1)
LYMPHOCYTES NFR BLD: 24 %
MAGNESIUM SERPL-MCNC: 2.02 MG/DL (ref 1.8–2.4)
MCH RBC QN AUTO: 27.8 PG (ref 26–34)
MCHC RBC AUTO-ENTMCNC: 32.9 G/DL (ref 31–36)
MCV RBC AUTO: 84.6 FL (ref 80–100)
MONOCYTES # BLD: 0.5 K/UL (ref 0–1.3)
MONOCYTES NFR BLD: 7.9 %
NEUTROPHILS # BLD: 4.2 K/UL (ref 1.7–7.7)
NEUTROPHILS NFR BLD: 64.8 %
ORGANISM: ABNORMAL
PHOSPHATE SERPL-MCNC: 3.6 MG/DL (ref 2.5–4.9)
PLATELET # BLD AUTO: 337 K/UL (ref 135–450)
PMV BLD AUTO: 9.6 FL (ref 5–10.5)
POTASSIUM SERPL-SCNC: 4.1 MMOL/L (ref 3.5–5.1)
RBC # BLD AUTO: 3.06 M/UL (ref 4–5.2)
SODIUM SERPL-SCNC: 138 MMOL/L (ref 136–145)
TROPONIN, HIGH SENSITIVITY: 34 NG/L (ref 0–14)
WBC # BLD AUTO: 6.5 K/UL (ref 4–11)

## 2025-07-07 PROCEDURE — 80048 BASIC METABOLIC PNL TOTAL CA: CPT

## 2025-07-07 PROCEDURE — 1200000000 HC SEMI PRIVATE

## 2025-07-07 PROCEDURE — 36415 COLL VENOUS BLD VENIPUNCTURE: CPT

## 2025-07-07 PROCEDURE — 84100 ASSAY OF PHOSPHORUS: CPT

## 2025-07-07 PROCEDURE — 6370000000 HC RX 637 (ALT 250 FOR IP): Performed by: INTERNAL MEDICINE

## 2025-07-07 PROCEDURE — 96375 TX/PRO/DX INJ NEW DRUG ADDON: CPT

## 2025-07-07 PROCEDURE — 2500000003 HC RX 250 WO HCPCS: Performed by: HOSPITALIST

## 2025-07-07 PROCEDURE — 96372 THER/PROPH/DIAG INJ SC/IM: CPT

## 2025-07-07 PROCEDURE — 85025 COMPLETE CBC W/AUTO DIFF WBC: CPT

## 2025-07-07 PROCEDURE — 96366 THER/PROPH/DIAG IV INF ADDON: CPT

## 2025-07-07 PROCEDURE — 82550 ASSAY OF CK (CPK): CPT

## 2025-07-07 PROCEDURE — 96376 TX/PRO/DX INJ SAME DRUG ADON: CPT

## 2025-07-07 PROCEDURE — 83735 ASSAY OF MAGNESIUM: CPT

## 2025-07-07 PROCEDURE — 6370000000 HC RX 637 (ALT 250 FOR IP)

## 2025-07-07 PROCEDURE — 6360000002 HC RX W HCPCS: Performed by: HOSPITALIST

## 2025-07-07 PROCEDURE — 94761 N-INVAS EAR/PLS OXIMETRY MLT: CPT

## 2025-07-07 PROCEDURE — 6370000000 HC RX 637 (ALT 250 FOR IP): Performed by: HOSPITALIST

## 2025-07-07 PROCEDURE — 84484 ASSAY OF TROPONIN QUANT: CPT

## 2025-07-07 RX ORDER — HYDRALAZINE HYDROCHLORIDE 50 MG/1
50 TABLET, FILM COATED ORAL 2 TIMES DAILY
Status: DISCONTINUED | OUTPATIENT
Start: 2025-07-07 | End: 2025-07-07

## 2025-07-07 RX ORDER — HYDRALAZINE HYDROCHLORIDE 50 MG/1
50 TABLET, FILM COATED ORAL EVERY 8 HOURS SCHEDULED
Status: DISCONTINUED | OUTPATIENT
Start: 2025-07-07 | End: 2025-07-11 | Stop reason: HOSPADM

## 2025-07-07 RX ADMIN — HEPARIN SODIUM 5000 UNITS: 5000 INJECTION INTRAVENOUS; SUBCUTANEOUS at 05:36

## 2025-07-07 RX ADMIN — NICARDIPINE HYDROCHLORIDE 2.5 MG/HR: 0.1 INJECTION INTRAVENOUS at 05:36

## 2025-07-07 RX ADMIN — HEPARIN SODIUM 5000 UNITS: 5000 INJECTION INTRAVENOUS; SUBCUTANEOUS at 14:33

## 2025-07-07 RX ADMIN — ONDANSETRON 4 MG: 2 INJECTION INTRAMUSCULAR; INTRAVENOUS at 22:54

## 2025-07-07 RX ADMIN — ONDANSETRON 4 MG: 2 INJECTION INTRAMUSCULAR; INTRAVENOUS at 03:02

## 2025-07-07 RX ADMIN — LABETALOL HYDROCHLORIDE 200 MG: 200 TABLET, FILM COATED ORAL at 08:30

## 2025-07-07 RX ADMIN — HEPARIN SODIUM 5000 UNITS: 5000 INJECTION INTRAVENOUS; SUBCUTANEOUS at 20:32

## 2025-07-07 RX ADMIN — SODIUM CHLORIDE, PRESERVATIVE FREE 10 ML: 5 INJECTION INTRAVENOUS at 08:33

## 2025-07-07 RX ADMIN — LOSARTAN POTASSIUM 100 MG: 100 TABLET, FILM COATED ORAL at 08:30

## 2025-07-07 RX ADMIN — HYDRALAZINE HYDROCHLORIDE 50 MG: 50 TABLET ORAL at 11:46

## 2025-07-07 RX ADMIN — ATORVASTATIN CALCIUM 40 MG: 40 TABLET, FILM COATED ORAL at 08:30

## 2025-07-07 RX ADMIN — HYDRALAZINE HYDROCHLORIDE 50 MG: 50 TABLET ORAL at 20:31

## 2025-07-07 RX ADMIN — LABETALOL HYDROCHLORIDE 200 MG: 200 TABLET, FILM COATED ORAL at 20:31

## 2025-07-07 RX ADMIN — ONDANSETRON 4 MG: 2 INJECTION INTRAMUSCULAR; INTRAVENOUS at 09:25

## 2025-07-07 RX ADMIN — HYDROMORPHONE HYDROCHLORIDE 0.5 MG: 1 INJECTION, SOLUTION INTRAMUSCULAR; INTRAVENOUS; SUBCUTANEOUS at 03:16

## 2025-07-07 RX ADMIN — HYDROMORPHONE HYDROCHLORIDE 0.5 MG: 1 INJECTION, SOLUTION INTRAMUSCULAR; INTRAVENOUS; SUBCUTANEOUS at 22:54

## 2025-07-07 RX ADMIN — NICARDIPINE HYDROCHLORIDE 1.5 MG/HR: 0.1 INJECTION INTRAVENOUS at 17:33

## 2025-07-07 RX ADMIN — FERROUS SULFATE TAB EC 324 MG (65 MG FE EQUIVALENT) 324 MG: 324 (65 FE) TABLET DELAYED RESPONSE at 08:30

## 2025-07-07 RX ADMIN — SODIUM CHLORIDE, PRESERVATIVE FREE 10 ML: 5 INJECTION INTRAVENOUS at 20:31

## 2025-07-07 ASSESSMENT — PAIN SCALES - GENERAL
PAINLEVEL_OUTOF10: 0
PAINLEVEL_OUTOF10: 7
PAINLEVEL_OUTOF10: 0
PAINLEVEL_OUTOF10: 0

## 2025-07-07 ASSESSMENT — PAIN - FUNCTIONAL ASSESSMENT: PAIN_FUNCTIONAL_ASSESSMENT: PREVENTS OR INTERFERES WITH ALL ACTIVE AND SOME PASSIVE ACTIVITIES

## 2025-07-07 ASSESSMENT — PAIN DESCRIPTION - ORIENTATION
ORIENTATION: RIGHT;LEFT;MID
ORIENTATION: RIGHT;LEFT

## 2025-07-07 ASSESSMENT — PAIN DESCRIPTION - LOCATION
LOCATION: ABDOMEN
LOCATION: ABDOMEN

## 2025-07-07 ASSESSMENT — PAIN DESCRIPTION - DESCRIPTORS
DESCRIPTORS: ACHING;DISCOMFORT
DESCRIPTORS: ACHING;DISCOMFORT

## 2025-07-07 NOTE — CARE COORDINATION
Case Management Assessment  Initial Evaluation    Date/Time of Evaluation: 7/7/2025 3:44 PM  Assessment Completed by: Alma Delia Jackson RN    If patient is discharged prior to next notation, then this note serves as note for discharge by case management.    Patient Name: Erinn Shankar                   YOB: 1980  Diagnosis: ESRD (end stage renal disease) (HCC) [N18.6]  Hypertensive urgency [I16.0]  Nausea and vomiting, unspecified vomiting type [R11.2]                   Date / Time: 7/6/2025  2:48 PM    Patient Admission Status: Inpatient   Readmission Risk (Low < 19, Mod (19-27), High > 27): Readmission Risk Score: 20.7    Current PCP: Diego Licona MD  PCP verified by CM? Yes    Chart Reviewed: Yes      History Provided by: Child/Family  Patient Orientation: Alert and Oriented    Patient Cognition:      Hospitalization in the last 30 days (Readmission):  Yes    If yes, Readmission Assessment in  Navigator will be completed.    Advance Directives:      Code Status: Full Code   Patient's Primary Decision Maker is: Legal Next of Kin    Primary Decision Maker: MARJAN SHANKAR - Parent - [phone]    Secondary Decision Maker: Christa Shankar - Brother/Sister - [phone]    Discharge Planning:    Patient lives with: Family Members Type of Home: House  Primary Care Giver: Self  Patient Support Systems include: Family Members, Parent   Current Financial resources: Medicare, Medicaid  Current community resources: None  Current services prior to admission: Other (Comment) (Outpatient HD)            Current DME:              Type of Home Care services:  None    ADLS  Prior functional level: Independent in ADLs/IADLs, Assistance with the following:, Housework, Shopping, Cooking  Current functional level: Assistance with the following:, Bathing, Dressing, Cooking, Housework, Shopping    PT AM-PAC:   /24  OT AM-PAC:   /24    Family can provide assistance at DC: Yes  Would you like Case

## 2025-07-07 NOTE — PROGRESS NOTES
Name:  Erinn Kulkarni /Age/Sex: 1980  (44 y.o. female)   MRN & CSN:  [id number] & [id number] Encounter Date/Time: 2025 12:07 PM EDT   Location:  [id number] PCP: Deigo Licona MD     Attending:Lg Osman MD       Erinn Kulkarni is a 44 y.o. female with  has a past medical history of CKD (chronic kidney disease) stage 4, GFR 15-29 ml/min ([facility]). who presents with Hypertensive urgency    Hospital Day: 2      Assessment and Plan     Hypertensive emergency   Cardene drip, wean as tolerated   Started on nifedipine   Monitor on telemetry   Nephrology following    ESRD on hemodialysis    schedule   Nephrology following    Anemia   Likely secondary to CKD   No signs of active bleeding   Monitor with CBC in a.m.    Objective:       Intake/Output Summary (Last 24 hours) at 2025 1207  Last data filed at 2025 0645  Gross per 24 hour   Intake 455.41 ml   Output --   Net 455.41 ml      Vitals:   Vitals:    25 0700 25 0830 25 0903 25 1146   BP: (!) 149/100 (!) 159/102  (!) 151/102   Pulse: 88 99 98    Resp: 16 (!) 9 19    Temp:  98.8 °F (37.1 °C)     TempSrc:  Oral     SpO2:  95% 97%    Weight:       Height:             Interval history:     Seen and examined at bedside. No acute events overnight.  Reports she had a lot of nausea and vomiting last night but is now resolving    Physical Exam:        General: NAD  Eyes: EOMI  ENT: neck supple  Cardiovascular: Regular rate.  Respiratory: Clear to auscultation  Gastrointestinal: Soft, non tender  Genitourinary: no suprapubic tenderness  Musculoskeletal: No edema  Neuro: Alert and oriented  Psych: Mood appropriate.     Review of Systems:      10 point ROS negative except stated above    Medications:   Medications:    hydrALAZINE  50 mg Oral BID    atorvastatin  40 mg Oral Daily    ferrous sulfate  324 mg Oral Daily with breakfast    labetalol  200 mg Oral BID    losartan  100 mg Oral  Daily    sodium chloride flush  5-40 mL IntraVENous 2 times per day    heparin (porcine)  5,000 Units SubCUTAneous 3 times per day      Infusions:    sodium chloride      niCARdipine 1.5 mg/hr (07/07/25 1005)     PRN Meds: iopamidol, 75 mL, ONCE PRN  sodium chloride flush, 5-40 mL, PRN  sodium chloride, , PRN  ondansetron, 4 mg, Q8H PRN   Or  ondansetron, 4 mg, Q6H PRN  polyethylene glycol, 17 g, Daily PRN  acetaminophen, 650 mg, Q6H PRN   Or  acetaminophen, 650 mg, Q6H PRN  HYDROmorphone, 0.25 mg, Q3H PRN   Or  HYDROmorphone, 0.5 mg, Q3H PRN        Labs and Imaging     Recent Labs     07/06/25  1638 07/06/25  1639 07/07/25  0830   WBC 6.9  --  6.5   HGB 9.1*  --  8.5*     --  337   MCV 84.9  --  84.6   NA  --  138 138   K  --  4.0 4.1   CL  --  99 101   CO2  --  24 24   BUN  --  16 17   CREATININE  --  6.3* 7.6*   GLUCOSE  --  99 106*   MG  --   --  2.02   PHOS  --   --  3.6   CALCIUM  --  9.7 9.6   ALBUMIN  --  4.1  --    AST  --  21  --    ALT  --  16  --    ALKPHOS  --  100  --        CT ABDOMEN PELVIS WO CONTRAST Additional Contrast? None  Result Date: 7/6/2025  Mild pulmonary edema with trace left pleural effusion. New mild fluid within the cul-de-sac, likely physiologic. No acute abnormality of the abdomen or pelvis. Evolving left perinephric hematoma is not significantly changed in size to post biopsy images from 3 weeks prior.       Comment: Please note this report has been produced using speech recognition software and may contain errors related to that system including errors in grammar, punctuation, and spelling, as well as words and phrases that may be inappropriate. If there are any questions or concerns, please feel free to contact the dictating provider for clarification.     Due to the immediate potential for life-threatening deterioration, I spent 33 minutes providing critical care. There was a high probability of clinically significant/life threatening deterioration in the patient's

## 2025-07-07 NOTE — DISCHARGE INSTR - COC
Continuity of Care Form    Patient Name: Erinn Kulkarni   :  1980  MRN:  [id number]    Admit date:  2025  Discharge date:  ***    Code Status Order: Full Code   Advance Directives:     Admitting Physician:  Lg Osman MD  PCP: Diego Licona MD    Discharging Nurse: ***  Discharging Hospital Unit/Room#: O3R-9657/2106-01  Discharging Unit Phone Number: ***    Emergency Contact:   Extended Emergency Contact Information  Primary Emergency Contact: SHADMARJAN  Address: [address] [place] [address] [place] [address] [facility]  Home Phone: [phone]  Relation: Parent  Secondary Emergency Contact: ShadChrista  Address:  [facility] [address] [place] [address] [facility]  Home Phone: [phone]  Relation: Brother/Sister    Past Surgical History:  Past Surgical History:   Procedure Laterality Date    CT BIOPSY RENAL  2025    CT BIOPSY RENAL 2025 [facility] CT    IR TUNNELED CVC PLACE WO SQ PORT/PUMP > 5 YEARS  2025    IR TUNNELED CVC PLACE WO SQ PORT/PUMP > 5 YEARS 2025 [facility] SPECIAL PROCEDURES       Immunization History:   Immunization History   Administered Date(s) Administered    COVID-19, PFIZER PURPLE top, DILUTE for use, (age 12 y+), 30mcg/0.3mL 2021, 10/22/2021    Influenza, FLUARIX, FLULAVAL, FLUZONE (age 6 mo+) and AFLURIA, (age 3 y+), Quadv PF, 0.5mL 2018, 01/15/2020    Influenza, FLUBLOK, (age 18 y+), Quadv PF, 0.5mL 2021    Influenza, FLUCELVAX, (age 6 mo+), MDCK, Quadv PF, 0.5mL 2023    TDaP, ADACEL (age 10y-64y), BOOSTRIX (age 10y+), IM, 0.5mL 2018       Active Problems:  Patient Active Problem List   Diagnosis Code    Acute renal failure superimposed on chronic kidney disease N17.9, N18.9    Acute pulmonary edema (HCC) J81.0    Enteritis K52.9    CKD (chronic kidney disease) stage 4, GFR 15-29 ml/min (HCC) N18.4    Hypertensive urgency I16.0    ESRD (end stage renal disease) (HCC) N18.6

## 2025-07-07 NOTE — ED PROVIDER NOTES
least 21 minutes.  Of nonconcurrent critical care time.  This includes vital sign monitoring, pulse oximetry monitoring, telemetry monitoring, clinical response to the IV medications, reviewing the nursing notes, consultation time, dictation/documentation time, and interpretation of the labwork. This excludes any separately billable procedures performed.    The critical care time above also includes time spent obtaining history from family, as the patient was unable to provide the history AND the history obtained was directly relevant to the care of the patient.     See interventions in ED course.     EMERGENCY DEPARTMENT COURSE and DIFFERENTIAL DIAGNOSIS/MDM:   Vitals:    Vitals:    07/06/25 1945 07/06/25 2000 07/06/25 2015 07/06/25 2030   BP: (!) 205/133 (!) 194/114 (!) 195/117 (!) 198/115   Pulse: 87 82 90 82   Resp: 17 11 21 12   Temp:       TempSrc:       SpO2:       Weight:       Height:           Is this patient to be included in the SEP-1 Core Measure due to severe sepsis or septic shock?   No   Exclusion criteria - the patient is NOT to be included for SEP-1 Core Measure due to:  Infection is not suspected    Patient was given the following medications:  Medications   iopamidol (ISOVUE-370) 76 % injection 75 mL (has no administration in time range)   HYDROmorphone (DILAUDID) injection 0.25 mg (has no administration in time range)     Or   HYDROmorphone (DILAUDID) injection 0.5 mg (has no administration in time range)   ondansetron (ZOFRAN) injection 4 mg (4 mg IntraVENous Given 7/6/25 1651)   oxyCODONE (ROXICODONE) immediate release tablet 5 mg (5 mg Oral Given 7/6/25 1653)   acetaminophen (TYLENOL) tablet 650 mg (650 mg Oral Given 7/6/25 1653)   hydrALAZINE (APRESOLINE) tablet 50 mg (50 mg Oral Given 7/6/25 1655)   labetalol (NORMODYNE;TRANDATE) injection 10 mg (10 mg IntraVENous Given 7/6/25 1945)             Chronic Conditions affecting care:      has a past medical history of CKD (chronic kidney  ESRD (end stage renal disease) ([facility])          DISPOSITION/PLAN     DISPOSITION Admitted 07/06/2025 08:19:37 PM               PATIENT REFERRED TO:  No follow-up provider specified.    DISCHARGE MEDICATIONS:  New Prescriptions    No medications on file       DISCONTINUED MEDICATIONS:  Discontinued Medications    VITAMIN D (ERGOCALCIFEROL) 1.25 MG (47780 UT) CAPS CAPSULE    Take 1 capsule by mouth once a week              (Please note that portions of this note were completed with a voice recognition program.  Efforts were made to edit the dictations but occasionally words are mis-transcribed.)    Patricia Michelle PA-C (electronically signed)        Patricia Michelle PA-C  07/06/25 0580

## 2025-07-07 NOTE — PLAN OF CARE
Problem: Discharge Planning  Goal: Discharge to home or other facility with appropriate resources  7/7/2025 1917 by Rakesh Platt RN  Outcome: Progressing  7/7/2025 1856 by Rakesh Platt RN  Outcome: Progressing     Problem: Cardiovascular - Adult  Goal: Maintains optimal cardiac output and hemodynamic stability  7/7/2025 1917 by Rakesh Platt RN  Outcome: Progressing  7/7/2025 1856 by Rakesh Platt RN  Outcome: Progressing     Problem: Gastrointestinal - Adult  Goal: Minimal or absence of nausea and vomiting  7/7/2025 1917 by Rakesh Platt RN  Outcome: Progressing  7/7/2025 1856 by Rakesh Platt RN  Outcome: Progressing  Goal: Maintains adequate nutritional intake  7/7/2025 1917 by Rakesh Platt RN  Outcome: Progressing  7/7/2025 1856 by Rakesh Platt RN  Outcome: Progressing     Problem: Safety - Adult  Goal: Free from fall injury  7/7/2025 1917 by Rakesh Platt RN  Outcome: Progressing  7/7/2025 1856 by Rakesh Platt RN  Outcome: Progressing     Problem: ABCDS Injury Assessment  Goal: Absence of physical injury  7/7/2025 1917 by Rakesh Platt RN  Outcome: Progressing  7/7/2025 1856 by Rakesh Platt RN  Outcome: Progressing

## 2025-07-07 NOTE — PROGRESS NOTES
NAME:  Erinn Kulkarni  YOB: 1980  MEDICAL RECORD NUMBER:  [id number]    Shift Summary:     PMH: HTN, End-Stage Renal Dz.    7/6 PM: Patient to ED c/o nz, lower abd pain and BL leg swelling, /133. Admin hydralazine and oxy for pain. To ICU for HTN urgency d/t SBP > 200. Cardene gtt and 1x labetolol admin. Off cardene gtt by 1 am. Around 3 am, nz and , resumed @ 2.5 mcg     Family updated: Yes:  Family @ BS at transfer to ICU.    Rhythm: Normal Sinus Rhythm     Most recent vitals:   Visit Vitals  BP (!) 198/115   Pulse 82   Temp 98.4 °F (36.9 °C) (Oral)   Resp 12   Ht 1.6 m (5' 3\")   Wt 70.7 kg (155 lb 13.8 oz)   SpO2 96%   BMI 27.61 kg/m²           No data found.    No data found.      Respiratory support needed (if any):  - RA    Admission weight Weight - Scale: 70.7 kg (155 lb 13.8 oz) (07/06/25 1453)    Today's weight    Wt Readings from Last 1 Encounters:   07/06/25 70.7 kg (155 lb 13.8 oz)        Christianson need assessed each shift: N/A - no christianson present  UOP >30ml/hr: YES  Last documented BM (in last 48 hrs):  No data found.             Restraints (in use currently or dc'd in last 12 hrs): No    Order current and documentation up to date? Yes    Lines/Drains reviewed @ bedside.         Drip rates at handoff:       Lab Data:   CBC:   Recent Labs     07/06/25  1638   WBC 6.9   HGB 9.1*   HCT 27.9*   MCV 84.9        BMP:    Recent Labs     07/06/25  1639      K 4.0   CO2 24   BUN 16   CREATININE 6.3*     ABG: No results for input(s): \"PHART\", \"PZU0HZA\", \"PO2ART\" in the last 72 hours.    Any consults during the shift? No    Any signed and held orders to be released?  No        4 Eyes Skin Assessment       The patient is being assessed for  Shift Handoff    I agree that at least one RN has performed a thorough Head to Toe Skin Assessment on the patient. ALL assessment sites listed below have been assessed.      Areas assessed by both nurses: Head, Face, Ears, Shoulders,  Back, Chest, Arms, Elbows, Hands, Sacrum. Buttock, Coccyx, Ischium, Legs. Feet and Heels, and Under Medical Devices         Does the Patient have a Wound? No noted wound(s)    Wound Care Orders initiated or active by RN: No       Garcia Prevention initiated or active: No    Pressure Injury (Stage 3,4, Unstageable, DTI, NWPT, and Complex wounds): No  If present, place \"IP Wound Care/Ostomy Nurse Eval and Treat\" in : No    Ostomies present: No  If new Ostomy, place \"IP Wound Care/Ostomy Nurse Eval and Treat\" in : No     Nurse 1 eSignature: Electronically signed by Rakesh Platt RN on 7/7/25 at 6:55 AM EDT    **SHARE this note so that the co-signing nurse can place an eSignature**    Nurse 2 eSignature: Electronically signed by Rakesh Platt RN on 7/7/25 at 6:58 PM EDT

## 2025-07-07 NOTE — PROGRESS NOTES
NAME:  Erinn Kulkarni  YOB: 1980  MEDICAL RECORD NUMBER:  [id number]    Shift Summary:     PMH: HTN, End-Stage Renal Dz.    7/6 PM: Patient to ED c/o nz, lower abd pain and BL leg swelling, /133. Admin hydralazine and oxy for pain. To ICU for HTN urgency d/t SBP > 200. Cardene gtt and 1x labetolol admin. Off cardene gtt by 1 am. Around 3 am, nz and , resumed @ 2.5 mcg     Family updated: Yes:  Family @ BS at transfer to ICU.    Rhythm: Normal Sinus Rhythm     Most recent vitals:   Visit Vitals  BP (!) 198/115   Pulse 82   Temp 98.4 °F (36.9 °C) (Oral)   Resp 12   Ht 1.6 m (5' 3\")   Wt 70.7 kg (155 lb 13.8 oz)   SpO2 96%   BMI 27.61 kg/m²           No data found.    No data found.      Respiratory support needed (if any):  - RA    Admission weight Weight - Scale: 70.7 kg (155 lb 13.8 oz) (07/06/25 1453)    Today's weight    Wt Readings from Last 1 Encounters:   07/06/25 70.7 kg (155 lb 13.8 oz)        Christianson need assessed each shift: N/A - no christianson present  UOP >30ml/hr: YES  Last documented BM (in last 48 hrs):  No data found.             Restraints (in use currently or dc'd in last 12 hrs): No    Order current and documentation up to date? Yes    Lines/Drains reviewed @ bedside.         Drip rates at handoff:       Lab Data:   CBC:   Recent Labs     07/06/25  1638   WBC 6.9   HGB 9.1*   HCT 27.9*   MCV 84.9        BMP:    Recent Labs     07/06/25  1639      K 4.0   CO2 24   BUN 16   CREATININE 6.3*     ABG: No results for input(s): \"PHART\", \"SYD8HWE\", \"PO2ART\" in the last 72 hours.    Any consults during the shift? No    Any signed and held orders to be released?  No        4 Eyes Skin Assessment       The patient is being assessed for  Shift Handoff    I agree that at least one RN has performed a thorough Head to Toe Skin Assessment on the patient. ALL assessment sites listed below have been assessed.      Areas assessed by both nurses: Head, Face, Ears, Shoulders,

## 2025-07-07 NOTE — PROGRESS NOTES
4 Eyes Skin Assessment     NAME:  Erinn Kulkarni  YOB: 1980  MEDICAL RECORD NUMBER:  [id number]    The patient is being assessed for  Admission    I agree that at least one RN has performed a thorough Head to Toe Skin Assessment on the patient. ALL assessment sites listed below have been assessed.      Areas assessed by both nurses:    Head, Face, Ears, Shoulders, Back, Chest, Arms, Elbows, Hands, Sacrum. Buttock, Coccyx, Ischium, Legs. Feet and Heels, and Under Medical Devices         Does the Patient have a Wound? No noted wound(s)       Garcia Prevention initiated by RN: No  Wound Care Orders initiated by RN: No    Pressure Injury (Stage 1,2,3,4, Unstageable, DTI, NWPT, and Complex wounds) if present, place Wound referral order by RN under : No    New Ostomies, if present place, Ostomy referral order under : No     Nurse 1 eSignature: Electronically signed by Rakesh Platt RN on 7/7/25 at 12:56 AM EDT    **SHARE this note so that the co-signing nurse can place an eSignature**    Nurse 2 eSignature: Electronically signed by Abdirashid Padilla RN on 7/7/25 at 1:09 AM EDT

## 2025-07-07 NOTE — H&P
V2.0  History and Physical      Name:  Erinn Kulkarni /Age/Sex: 1980  (44 y.o. female)   MRN & CSN:  [id number] & [id number] Encounter Date/Time: 2025 8:19 PM EDT   Location:  [address] [place] [address] PCP: Diego Licona MD       Hospital Day: 1    Assessment and Plan:   Erinn Kulkarni is a 44 y.o. female with a pmh of hypertension and end-stage renal disease who presents with Hypertensive urgency    Hospital Problems           Last Modified POA    * (Principal) Hypertensive urgency 2025 Yes    ESRD (end stage renal disease) (HCC) 2025 Yes    Gastritis 2025 Yes       Plan:  Observer intensive care unit.  As needed Zofran.  Resume home blood pressure medication.  Hold hydralazine.  Cardene drip ordered  End-stage renal disease on dialysis-nephrology consult      Advance care planning:  Advanced care planning was discussed with patient for a total of  16 minutes.  Full code, DNR CC, DNR CCA, power of  and living will were discussed.  Patient elected to be  a full code.   Disposition:   Current Living situation: Home  Expected Disposition: Home  Estimated D/C: 2 days    Diet Cardiac diet   DVT Prophylaxis [] Lovenox, [x]  Heparin, [] SCDs, [] Ambulation,  [] Eliquis, [] Xarelto, [] Coumadin   Code Status Full code   Surrogate Decision Maker/ POA SisterChrista     Personally reviewed Lab Studies and Imaging     Discussed management of the case with  ED provider who recommended admission        Imaging that was interpreted personally includes CT of abdomen and pelvis without IV contrast and results Mild pulmonary edema with trace left pleural effusion    Drugs that require monitoring for toxicity include nicardipine and the method of monitoring was blood pressure monitor        History from:     patient    History of Present Illness:     Chief Complaint: Nausea, vomiting and abdominal pain  Erinn Kulkarni is a 44 y.o. female with pmh of hypertension and end-stage renal disease

## 2025-07-07 NOTE — PLAN OF CARE
Problem: Discharge Planning  Goal: Discharge to home or other facility with appropriate resources  Outcome: Progressing     Problem: Cardiovascular - Adult  Goal: Maintains optimal cardiac output and hemodynamic stability  Outcome: Progressing     Problem: Gastrointestinal - Adult  Goal: Minimal or absence of nausea and vomiting  Outcome: Progressing  Goal: Maintains adequate nutritional intake  Outcome: Progressing

## 2025-07-07 NOTE — ED NOTES
ED TO INPATIENT SBAR HANDOFF    Patient Name: Erinn Kulkarni   Preferred Name: Erinn  : 1980  44 y.o.   Family/Caregiver Present: no   Code Status Order: Prior  PO Status: NPO:No  Telemetry Order: Yes  C-SSRS: Risk of Suicide: No Risk  Sitter no   Restraints:     Sepsis Risk Score      Situation  Chief Complaint   Patient presents with    Abdominal Pain     Lower abd pain that started this morning that goes across her lower abd 8/10, the patient is also complaining of bilateral lower leg swelling and is a dialysis patient that last full treatment was sat       Brief Description of Patient's Condition: pt alert an oriented and appropriate.  Pt states she recnetly began havin gHD about 2 weeks ago.  Tunnel cath in place in right chest.  Pt sattes she has no limb restriction at this time  Mental Status: oriented, alert, coherent, logical, thought processes intact, and able to concentrate and follow conversation  Arrived from:Home  Imaging:   CT ABDOMEN PELVIS WO CONTRAST Additional Contrast? None   Final Result   Mild pulmonary edema with trace left pleural effusion.      New mild fluid within the cul-de-sac, likely physiologic.      No acute abnormality of the abdomen or pelvis.      Evolving left perinephric hematoma is not significantly changed in size to   post biopsy images from 3 weeks prior.           Abnormal labs:   Abnormal Labs Reviewed   CBC WITH AUTO DIFFERENTIAL - Abnormal; Notable for the following components:       Result Value    RBC 3.29 (*)     Hemoglobin 9.1 (*)     Hematocrit 27.9 (*)     RDW 17.9 (*)     All other components within normal limits   COMPREHENSIVE METABOLIC PANEL W/ REFLEX TO MG FOR LOW K - Abnormal; Notable for the following components:    Creatinine 6.3 (*)     Est, Glom Filt Rate 8 (*)     All other components within normal limits    Narrative:     CALL  Michael VICKERS tel. [phone],  Chemistry results called to and read back by CONRADO Blevins, 2025 17:26,  10 mg (10 mg IntraVENous Given 7/6/25 1945)     Last documented pain medication administration: 1653  Pertinent or High Risk Medications/Drips: no   If Yes, please provide details: no  Blood Product Administration: no  If Yes, please provide details: no  Process Protocols/Bundles: N/A    Recommendation  Incomplete STAT orders: none   Overdue Medications: none   Patient Belongings:    Additional Comments:   If any further questions, please call Sending RN at [phone]       Admitting Unit Notification  Name of person notified and time:       Electronically signed by: Electronically signed by Ed Edwards RN on 7/6/2025 at 8:42 PM

## 2025-07-07 NOTE — ED PROVIDER NOTES
Emergency Department Attending Provider Note  Location: [address] ICU  7/6/2025   Note Started: 11:47 AM EDT 7/7/25     THIS IS MY YAMIL SUPERVISORY AND SHARED VISIT NOTE:    Patient Identification  Erinn Kulkarni is a 44 y.o. female      HPI:Erinn Kulkarni was evaluated in the Emergency Department for lower abdominal pain this morning.  Patient has had associated nausea and vomiting.  No hematemesis or bilious emesis.  Patient states she did start a new blood pressure medication yesterday but did not take her medications this morning because of her vomiting.  She states she also has a history of dialysis and had a session yesterday.  Patient states that she feels that her lower extremity edema may be worse than it was prior to dialysis.  She denies any chest pain or shortness of breath.  Has history of renal failure secondary to hypertension.  No fevers or chills.  No numbness or weakness.  No headache or vision changes.  Although initial history and physical exam information was obtained by YAMIL/NPP (who also dictated a record of this visit), I personally saw the patient and made/approved the management plan and take responsibility for the patient management.       PHYSICAL EXAM:     CONSTITUTIONAL: AOx4, cooperative with exam, afebrile, ill-appearing   HEAD: normocephalic, atraumatic   EYES: PERRL, EOMI, anicteric sclera   ENT: Moist mucous membranes, uvula midline   NECK: Supple, symmetric, trachea midline   BACK: Symmetric, no deformity   LUNGS: Bilateral breath sounds, CTAB, no rales/ronchi/wheezes   CARDIOVASCULAR: RRR, normal S1/S2, no m/r/g, 2+ pulses throughout   ABDOMEN: Soft, generalized tenderness across the lower abdomen without any rigidity or peritoneal signs, non-distended, +BS, no CVA tenderness   NEUROLOGIC:  MAEx4, 5/5 strength throughout; fine touch sensation intact throughout; normal gait; GCS 15   MUSCULOSKELETAL: 1+ edema bilateral lower extremities, no calf tenderness bilaterally,  urgency    3. ESRD (end stage renal disease) ([facility])      DISPOSITION Admitted 07/06/2025 08:19:37 PM           I, Sarbjit Conklin MD, am the primary clinician of record.   I personally saw the patient and independently provided 31 minutes of non-concurrent critical care out of the total shared critical care time provided.    This chart was generated in part by using Dragon Dictation system and may contain errors related to that system including errors in grammar, punctuation, and spelling, as well as words and phrases that may be inappropriate. If there are any questions or concerns please feel free to contact the dictating provider for clarification.     Sarbjit Conklin MD   [employer]        Sarbjit Conklin MD  07/07/25 0584

## 2025-07-07 NOTE — PLAN OF CARE
Problem: Discharge Planning  Goal: Discharge to home or other facility with appropriate resources  Outcome: Progressing     Problem: Cardiovascular - Adult  Goal: Maintains optimal cardiac output and hemodynamic stability  Outcome: Progressing     Problem: Gastrointestinal - Adult  Goal: Minimal or absence of nausea and vomiting  Outcome: Progressing  Goal: Maintains adequate nutritional intake  Outcome: Progressing     Problem: Safety - Adult  Goal: Free from fall injury  Outcome: Progressing     Problem: ABCDS Injury Assessment  Goal: Absence of physical injury  Outcome: Progressing

## 2025-07-08 LAB
ALBUMIN SERPL-MCNC: 3.4 G/DL (ref 3.4–5)
ANION GAP SERPL CALCULATED.3IONS-SCNC: 11 MMOL/L (ref 3–16)
BASOPHILS # BLD: 0.1 K/UL (ref 0–0.2)
BASOPHILS NFR BLD: 1 %
BUN SERPL-MCNC: 24 MG/DL (ref 7–20)
CALCIUM SERPL-MCNC: 8.9 MG/DL (ref 8.3–10.6)
CHLORIDE SERPL-SCNC: 101 MMOL/L (ref 99–110)
CO2 SERPL-SCNC: 24 MMOL/L (ref 21–32)
CREAT SERPL-MCNC: 8.9 MG/DL (ref 0.6–1.1)
DEPRECATED RDW RBC AUTO: 18.1 % (ref 12.4–15.4)
EOSINOPHIL # BLD: 0.2 K/UL (ref 0–0.6)
EOSINOPHIL NFR BLD: 2.8 %
GFR SERPLBLD CREATININE-BSD FMLA CKD-EPI: 5 ML/MIN/{1.73_M2}
GLUCOSE SERPL-MCNC: 111 MG/DL (ref 70–99)
HCT VFR BLD AUTO: 25.1 % (ref 36–48)
HGB BLD-MCNC: 8.3 G/DL (ref 12–16)
LYMPHOCYTES # BLD: 1.8 K/UL (ref 1–5.1)
LYMPHOCYTES NFR BLD: 23.5 %
MAGNESIUM SERPL-MCNC: 1.98 MG/DL (ref 1.8–2.4)
MCH RBC QN AUTO: 28.1 PG (ref 26–34)
MCHC RBC AUTO-ENTMCNC: 33.2 G/DL (ref 31–36)
MCV RBC AUTO: 84.7 FL (ref 80–100)
MONOCYTES # BLD: 0.7 K/UL (ref 0–1.3)
MONOCYTES NFR BLD: 8.6 %
NEUTROPHILS # BLD: 5 K/UL (ref 1.7–7.7)
NEUTROPHILS NFR BLD: 64.1 %
PHOSPHATE SERPL-MCNC: 3.5 MG/DL (ref 2.5–4.9)
PLATELET # BLD AUTO: 336 K/UL (ref 135–450)
PMV BLD AUTO: 9.8 FL (ref 5–10.5)
POTASSIUM SERPL-SCNC: 4.4 MMOL/L (ref 3.5–5.1)
POTASSIUM SERPL-SCNC: 4.4 MMOL/L (ref 3.5–5.1)
RBC # BLD AUTO: 2.96 M/UL (ref 4–5.2)
SODIUM SERPL-SCNC: 136 MMOL/L (ref 136–145)
WBC # BLD AUTO: 7.8 K/UL (ref 4–11)

## 2025-07-08 PROCEDURE — 6370000000 HC RX 637 (ALT 250 FOR IP): Performed by: INTERNAL MEDICINE

## 2025-07-08 PROCEDURE — 36415 COLL VENOUS BLD VENIPUNCTURE: CPT

## 2025-07-08 PROCEDURE — 6360000002 HC RX W HCPCS

## 2025-07-08 PROCEDURE — 6370000000 HC RX 637 (ALT 250 FOR IP): Performed by: HOSPITALIST

## 2025-07-08 PROCEDURE — 85025 COMPLETE CBC W/AUTO DIFF WBC: CPT

## 2025-07-08 PROCEDURE — 6360000002 HC RX W HCPCS: Performed by: HOSPITALIST

## 2025-07-08 PROCEDURE — 2500000003 HC RX 250 WO HCPCS: Performed by: HOSPITALIST

## 2025-07-08 PROCEDURE — 83735 ASSAY OF MAGNESIUM: CPT

## 2025-07-08 PROCEDURE — 80069 RENAL FUNCTION PANEL: CPT

## 2025-07-08 PROCEDURE — 6360000002 HC RX W HCPCS: Performed by: INTERNAL MEDICINE

## 2025-07-08 PROCEDURE — 2060000000 HC ICU INTERMEDIATE R&B

## 2025-07-08 PROCEDURE — 90935 HEMODIALYSIS ONE EVALUATION: CPT

## 2025-07-08 RX ORDER — PROCHLORPERAZINE EDISYLATE 5 MG/ML
10 INJECTION INTRAMUSCULAR; INTRAVENOUS EVERY 6 HOURS PRN
Status: DISCONTINUED | OUTPATIENT
Start: 2025-07-08 | End: 2025-07-11 | Stop reason: HOSPADM

## 2025-07-08 RX ORDER — HEPARIN SODIUM 1000 [USP'U]/ML
3200 INJECTION, SOLUTION INTRAVENOUS; SUBCUTANEOUS PRN
Status: DISCONTINUED | OUTPATIENT
Start: 2025-07-08 | End: 2025-07-11 | Stop reason: HOSPADM

## 2025-07-08 RX ADMIN — HEPARIN SODIUM 5000 UNITS: 5000 INJECTION INTRAVENOUS; SUBCUTANEOUS at 04:51

## 2025-07-08 RX ADMIN — HYDROMORPHONE HYDROCHLORIDE 0.5 MG: 1 INJECTION, SOLUTION INTRAMUSCULAR; INTRAVENOUS; SUBCUTANEOUS at 04:51

## 2025-07-08 RX ADMIN — HYDRALAZINE HYDROCHLORIDE 50 MG: 50 TABLET ORAL at 13:44

## 2025-07-08 RX ADMIN — HEPARIN SODIUM 3200 UNITS: 1000 INJECTION INTRAVENOUS; SUBCUTANEOUS at 17:46

## 2025-07-08 RX ADMIN — ACETAMINOPHEN 650 MG: 325 TABLET ORAL at 02:40

## 2025-07-08 RX ADMIN — ONDANSETRON 4 MG: 2 INJECTION INTRAMUSCULAR; INTRAVENOUS at 19:52

## 2025-07-08 RX ADMIN — LOSARTAN POTASSIUM 100 MG: 100 TABLET, FILM COATED ORAL at 09:10

## 2025-07-08 RX ADMIN — HEPARIN SODIUM 5000 UNITS: 5000 INJECTION INTRAVENOUS; SUBCUTANEOUS at 20:36

## 2025-07-08 RX ADMIN — FERROUS SULFATE TAB EC 324 MG (65 MG FE EQUIVALENT) 324 MG: 324 (65 FE) TABLET DELAYED RESPONSE at 09:10

## 2025-07-08 RX ADMIN — HYDRALAZINE HYDROCHLORIDE 50 MG: 50 TABLET ORAL at 04:50

## 2025-07-08 RX ADMIN — HYDRALAZINE HYDROCHLORIDE 50 MG: 50 TABLET ORAL at 20:36

## 2025-07-08 RX ADMIN — LABETALOL HYDROCHLORIDE 200 MG: 200 TABLET, FILM COATED ORAL at 19:55

## 2025-07-08 RX ADMIN — SODIUM CHLORIDE, PRESERVATIVE FREE 10 ML: 5 INJECTION INTRAVENOUS at 20:37

## 2025-07-08 RX ADMIN — ONDANSETRON 4 MG: 2 INJECTION INTRAMUSCULAR; INTRAVENOUS at 12:29

## 2025-07-08 RX ADMIN — EPOETIN ALFA-EPBX 10000 UNITS: 10000 INJECTION, SOLUTION INTRAVENOUS; SUBCUTANEOUS at 17:43

## 2025-07-08 RX ADMIN — LABETALOL HYDROCHLORIDE 200 MG: 200 TABLET, FILM COATED ORAL at 09:10

## 2025-07-08 RX ADMIN — ATORVASTATIN CALCIUM 40 MG: 40 TABLET, FILM COATED ORAL at 09:10

## 2025-07-08 RX ADMIN — ONDANSETRON 4 MG: 2 INJECTION INTRAMUSCULAR; INTRAVENOUS at 04:52

## 2025-07-08 RX ADMIN — PROCHLORPERAZINE EDISYLATE 10 MG: 5 INJECTION INTRAMUSCULAR; INTRAVENOUS at 08:24

## 2025-07-08 RX ADMIN — HEPARIN SODIUM 5000 UNITS: 5000 INJECTION INTRAVENOUS; SUBCUTANEOUS at 13:44

## 2025-07-08 RX ADMIN — SODIUM CHLORIDE, PRESERVATIVE FREE 10 ML: 5 INJECTION INTRAVENOUS at 09:11

## 2025-07-08 ASSESSMENT — PAIN SCALES - GENERAL
PAINLEVEL_OUTOF10: 0
PAINLEVEL_OUTOF10: 0
PAINLEVEL_OUTOF10: 7
PAINLEVEL_OUTOF10: 0

## 2025-07-08 ASSESSMENT — PAIN DESCRIPTION - LOCATION
LOCATION: ABDOMEN
LOCATION: HEAD

## 2025-07-08 ASSESSMENT — PAIN DESCRIPTION - ORIENTATION
ORIENTATION: RIGHT;LEFT;MID
ORIENTATION: MID

## 2025-07-08 ASSESSMENT — PAIN - FUNCTIONAL ASSESSMENT: PAIN_FUNCTIONAL_ASSESSMENT: PREVENTS OR INTERFERES SOME ACTIVE ACTIVITIES AND ADLS

## 2025-07-08 ASSESSMENT — PAIN DESCRIPTION - DESCRIPTORS
DESCRIPTORS: ACHING
DESCRIPTORS: ACHING;DISCOMFORT

## 2025-07-08 NOTE — PROGRESS NOTES
Name:  Erinn Kulkarni /Age/Sex: 1980  (44 y.o. female)   MRN & CSN:  [id number] & [id number] Encounter Date/Time: 2025 12:07 PM EDT   Location:  [id number] PCP: Diego Licona MD     Attending:Lg Osman MD       Erinn Kulkarni is a 44 y.o. female with  has a past medical history of CKD (chronic kidney disease) stage 4, GFR 15-29 ml/min ([facility]). who presents with Hypertensive urgency    Hospital Day: 3      Assessment and Plan     Hypertensive emergency   Weaned off Cardene drip   Continue labetalol, losartan, hydralazine   Monitor on telemetry   Nephrology following    Nausea and vomiting   Likely due to above   Supportive care with antiemetics    ESRD on hemodialysis    schedule   Nephrology following    Anemia   Likely secondary to CKD   No signs of active bleeding   Monitor with CBC in a.m.    Objective:       Intake/Output Summary (Last 24 hours) at 2025 1201  Last data filed at 2025 0631  Gross per 24 hour   Intake 1048.61 ml   Output --   Net 1048.61 ml      Vitals:   Vitals:    25 0900 25 0910 25 1000 25 1100   BP: (!) 146/98 (!) 146/98 (!) 148/104 (!) 155/98   Pulse: 86 83 71 84   Resp: 14  14 11   Temp:       TempSrc:       SpO2: 95%  97% 97%   Weight:       Height:             Interval history:     Seen and examined at bedside. No acute events overnight.  This morning patient had episode of nausea which resolved for now, denies any active chest pain or shortness of breath    Physical Exam:        General: NAD  Eyes: EOMI  ENT: neck supple  Cardiovascular: Regular rate.  Respiratory: Clear to auscultation  Gastrointestinal: Soft, non tender  Genitourinary: no suprapubic tenderness  Musculoskeletal: No edema  Neuro: Alert and oriented  Psych: Mood appropriate.     Review of Systems:      10 point ROS negative except stated above    Medications:   Medications:    epoetin monica-epbx  10,000 Units IntraVENous  Once per day on Tuesday Thursday Saturday    hydrALAZINE  50 mg Oral 3 times per day    atorvastatin  40 mg Oral Daily    ferrous sulfate  324 mg Oral Daily with breakfast    labetalol  200 mg Oral BID    losartan  100 mg Oral Daily    sodium chloride flush  5-40 mL IntraVENous 2 times per day    heparin (porcine)  5,000 Units SubCUTAneous 3 times per day      Infusions:    sodium chloride      niCARdipine Stopped (07/08/25 0627)     PRN Meds: prochlorperazine, 10 mg, Q6H PRN  iopamidol, 75 mL, ONCE PRN  sodium chloride flush, 5-40 mL, PRN  sodium chloride, , PRN  ondansetron, 4 mg, Q8H PRN   Or  ondansetron, 4 mg, Q6H PRN  polyethylene glycol, 17 g, Daily PRN  acetaminophen, 650 mg, Q6H PRN   Or  acetaminophen, 650 mg, Q6H PRN  HYDROmorphone, 0.25 mg, Q3H PRN   Or  HYDROmorphone, 0.5 mg, Q3H PRN        Labs and Imaging     Recent Labs     07/06/25  1638 07/06/25  1639 07/06/25  1639 07/07/25  0830 07/08/25  0443   WBC 6.9  --   --  6.5 7.8   HGB 9.1*  --   --  8.5* 8.3*     --   --  337 336   MCV 84.9  --   --  84.6 84.7   NA  --  138  --  138 136   K  --  4.0   < > 4.1 4.4  4.4   CL  --  99  --  101 101   CO2  --  24  --  24 24   BUN  --  16  --  17 24*   CREATININE  --  6.3*  --  7.6* 8.9*   GLUCOSE  --  99  --  106* 111*   MG  --   --   --  2.02 1.98   PHOS  --   --   --  3.6 3.5   CALCIUM  --  9.7  --  9.6 8.9   ALBUMIN  --  4.1  --   --  3.4   AST  --  21  --   --   --    ALT  --  16  --   --   --    ALKPHOS  --  100  --   --   --     < > = values in this interval not displayed.       CT ABDOMEN PELVIS WO CONTRAST Additional Contrast? None  Result Date: 7/6/2025  Mild pulmonary edema with trace left pleural effusion. New mild fluid within the cul-de-sac, likely physiologic. No acute abnormality of the abdomen or pelvis. Evolving left perinephric hematoma is not significantly changed in size to post biopsy images from 3 weeks prior.       Comment: Please note this report has been produced using speech

## 2025-07-08 NOTE — PROGRESS NOTES
NAME:  Erinn Kulkarni  YOB: 1980  MEDICAL RECORD NUMBER:  [id number]    Shift Summary: Pt. Had 2 episode of nausea/vomiting this morning. Compazine and zofran given with some relief.  Md notified. NNO. Pt. To HD today. Pt. Downgraded to PCU.    Family updated: Yes:  parents at bedside    Rhythm: Normal Sinus Rhythm     Most recent vitals:   Visit Vitals  BP (!) 162/102   Pulse 82   Temp 98.1 °F (36.7 °C) (Oral)   Resp 15   Ht 1.6 m (5' 3\")   Wt 65.6 kg (144 lb 10 oz)   SpO2 99%   BMI 25.62 kg/m²           No data found.    No data found.      Respiratory support needed (if any):  - RA    Admission weight Weight - Scale: 70.7 kg (155 lb 13.8 oz) (07/06/25 1453)    Today's weight    Wt Readings from Last 1 Encounters:   07/08/25 65.6 kg (144 lb 10 oz)        Christianson need assessed each shift: N/A - no christianson present  UOP >30ml/hr: YES  Last documented BM (in last 48 hrs):  Patient Vitals for the past 48 hrs:   Stool Occurrence   07/07/25 2200 1                Restraints (in use currently or dc'd in last 12 hrs): No    Order current and documentation up to date? No    Lines/Drains reviewed @ bedside.  Peripheral IV 07/06/25 Right Antecubital (Active)   Number of days: 1       Peripheral IV 07/06/25 Right Hand (Active)   Number of days: 1       Hemodialysis Central Access - Permanent/Tunneled Right Neck (Active)   Number of days: 21         Drip rates at handoff:    sodium chloride      niCARdipine Stopped (07/08/25 0627)       Lab Data:   CBC:   Recent Labs     07/07/25  0830 07/08/25  0443   WBC 6.5 7.8   HGB 8.5* 8.3*   HCT 25.9* 25.1*   MCV 84.6 84.7    336     BMP:    Recent Labs     07/07/25  0830 07/08/25  0443    136   K 4.1 4.4  4.4   CO2 24 24   BUN 17 24*   CREATININE 7.6* 8.9*     ABG: No results for input(s): \"PHART\", \"YGZ4GSG\", \"PO2ART\" in the last 72 hours.    Any consults during the shift? No    Any signed and held orders to be released?  No        4 Eyes Skin Assessment

## 2025-07-08 NOTE — PLAN OF CARE
Problem: Discharge Planning  Goal: Discharge to home or other facility with appropriate resources  7/8/2025 0856 by Nelsy Starks RN  Outcome: Progressing  Flowsheets (Taken 7/8/2025 0800)  Discharge to home or other facility with appropriate resources: Identify barriers to discharge with patient and caregiver  7/7/2025 1917 by Rakesh Platt RN  Outcome: Progressing  7/7/2025 1856 by Rakesh Platt RN  Outcome: Progressing     Problem: Cardiovascular - Adult  Goal: Maintains optimal cardiac output and hemodynamic stability  7/8/2025 0856 by Nelsy Starks RN  Outcome: Progressing  Flowsheets (Taken 7/8/2025 0800)  Maintains optimal cardiac output and hemodynamic stability:   Monitor blood pressure and heart rate   Monitor urine output and notify Licensed Independent Practitioner for values outside of normal range   Assess for signs of decreased cardiac output   Administer fluid and/or volume expanders as ordered  7/7/2025 1917 by Rakesh Platt RN  Outcome: Progressing  7/7/2025 1856 by Rakesh Platt RN  Outcome: Progressing     Problem: Gastrointestinal - Adult  Goal: Minimal or absence of nausea and vomiting  7/8/2025 0856 by Nelsy Starks RN  Outcome: Progressing  Flowsheets (Taken 7/8/2025 0800)  Minimal or absence of nausea and vomiting:   Administer IV fluids as ordered to ensure adequate hydration   Maintain NPO status until nausea and vomiting are resolved   Administer ordered antiemetic medications as needed   Advance diet as tolerated, if ordered   Nutrition consult to assist patient with adequate nutrition and appropriate food choices   Provide nonpharmacologic comfort measures as appropriate  7/7/2025 1917 by Rakesh Platt RN  Outcome: Progressing  7/7/2025 1856 by Rakesh Platt RN  Outcome: Progressing  Goal: Maintains adequate nutritional intake  7/8/2025 0856 by Nelsy Starks RN  Outcome: Progressing  Flowsheets (Taken 7/8/2025 0800)  Maintains adequate nutritional intake: Monitor

## 2025-07-08 NOTE — PROGRESS NOTES
Department of Internal Medicine  Nephrology Progress Note          REASON FOR CONSULTATION:  Maintenance dialysis.     HISTORY OF PRESENTING ILLNESS:  44-year-old female with past medical history significant for hypertension; end-stage renal disease, on hemodialysis Tuesday, Thursday, Saturday, follows up with Dr. Villar, came to ER complaining of nausea, vomiting, abdominal pain.  Denies any fever, chills, or rigors.  At the time of presentation, found to have a blood pressure greater than 200 systolic and diastolic greater than 110.  Admitted in ICU, started on a Cardene drip.  Renal consultation has been called for maintenance dialysis as well as blood pressure control.      Events noted , off cardene gtt   REVIEW OF SYSTEMS:  C/o nausea / abd pain .     Physical Exam:    VITALS:  BP (!) 155/98   Pulse 84   Temp 98.5 °F (36.9 °C) (Oral)   Resp 11   Ht 1.6 m (5' 3\")   Wt 65.6 kg (144 lb 10 oz)   LMP 06/10/2025 (Exact Date)   SpO2 97%   BMI 25.62 kg/m²   24HR INTAKE/OUTPUT:    Intake/Output Summary (Last 24 hours) at 7/8/2025 1122  Last data filed at 7/8/2025 0631  Gross per 24 hour   Intake 1048.61 ml   Output --   Net 1048.61 ml       Constitutional:Resting   Respiratory:  CTA  Gastrointestinal:  No  tenderness.  Normal Bowel Sounds  Cardiovascular:  S1, S2 RRR   Edema:  Lower Extremity has no edema    DATA:    CBC:  Lab Results   Component Value Date/Time    WBC 7.8 07/08/2025 04:43 AM    RBC 2.96 07/08/2025 04:43 AM    HGB 8.3 07/08/2025 04:43 AM    HCT 25.1 07/08/2025 04:43 AM    MCV 84.7 07/08/2025 04:43 AM    MCH 28.1 07/08/2025 04:43 AM    MCHC 33.2 07/08/2025 04:43 AM    RDW 18.1 07/08/2025 04:43 AM     07/08/2025 04:43 AM    MPV 9.8 07/08/2025 04:43 AM     CMP:  Lab Results   Component Value Date/Time     07/08/2025 04:43 AM    K 4.4 07/08/2025 04:43 AM    K 4.4 07/08/2025 04:43 AM     07/08/2025 04:43 AM    CO2 24 07/08/2025 04:43 AM    BUN 24 07/08/2025 04:43 AM    CREATININE

## 2025-07-08 NOTE — PROGRESS NOTES
Pt off floor to HD with transport via bed.    Electronically signed by Anali Cole RN on 7/8/2025 at 2:14 PM

## 2025-07-08 NOTE — PROGRESS NOTES
Patient arrived from ICU to PCU 5108. Vital signs obtained, tele monitor applied, confirmed with CMU. See flowsheet for assessment. Patient oriented to room and call light. Updated on safety measures and plan of care, agreeable and v/u at this time. Bed locked in lowest position, call light within reach. Care ongoing at this time. Family at bedside.     4 Eyes Skin Assessment     NAME:  Erinn Kulkarni  YOB: 1980  MEDICAL RECORD NUMBER:  [id number]    The patient is being assessed for  Transfer to New Unit    I agree that at least one RN has performed a thorough Head to Toe Skin Assessment on the patient. ALL assessment sites listed below have been assessed.      Areas assessed by both nurses:    Head, Face, Ears, Shoulders, Back, Chest, Arms, Elbows, Hands, Sacrum. Buttock, Coccyx, Ischium, Legs. Feet and Heels, and Under Medical Devices         Does the Patient have a Wound? No noted wound(s)       Garcia Prevention initiated by RN: No  Wound Care Orders initiated by RN: No    Pressure Injury (Stage 1,2,3,4, Unstageable, DTI, NWPT, and Complex wounds) if present, place Wound referral order by RN under : No    New Ostomies, if present place, Ostomy referral order under : No     Nurse 1 eSignature: Electronically signed by Anali Cole RN on 7/8/25 at 1:49 PM EDT    **SHARE this note so that the co-signing nurse can place an eSignature**    Nurse 2 eSignature: Electronically signed by Dante Arellano RN on 7/8/25 at 1:50 PM EDT

## 2025-07-08 NOTE — PROGRESS NOTES
HEMODIALYSIS POST TREATMENT NOTE    Treatment time ordered: 210    Actual treatment time: 210    UltraFiltration Goal: 3000ml  UltraFiltration Removed: 3000ml      Pre Treatment weight: 77.5  Post Treatment weight: 74.7  Estimated Dry Weight: 74.5    Access used:     Central Venous Catheter:          Tunneled or Non-tunneled: Tunneled           Site: Rt Chest          Access Flow: good-400         Sign and symptoms of infection: No       If YES: NA    Medications or blood products given: Retacrit 10,000U    Chronic outpatient schedule: [facility]    Chronic outpatient unit: [facility]    Summary of response to treatment: Pt had a complete Tx with no complications.    Explain if orders NOT met, was physician notified:STEF      ACES flowsheet faxed to patient unit/ placed in patient chart: yes    Post assessment completed: yes    Report given to: CONRADO Whittington      * Intra-treatment documented Safety Checks include the followin) Access and face visible at all times.     2) All connections and blood lines are secure with no kinks.     3) NVL alarm engaged.     4) Hemosafe device applied (if applicable).     5) No collapse of Arterial or Venous blood chambers.     6) All blood lines / pump segments in the air detectors.

## 2025-07-08 NOTE — PROGRESS NOTES
HEMODIALYSIS POST TREATMENT NOTE    Treatment time ordered: 210    Actual treatment time: 210    UltraFiltration Goal: 3000ml  UltraFiltration Removed: 3000ml      Pre Treatment weight: 77.5  Post Treatment weight: 74.7  Estimated Dry Weight: 74.5    Access used:     Central Venous Catheter:          Tunneled or Non-tunneled: Tunneled           Site: Rt Chest          Access Flow: good-400         Sign and symptoms of infection: No       If YES: NA    Medications or blood products given: None    Chronic outpatient schedule: [facility]    Chronic outpatient unit: [facility]    Summary of response to treatment: Pt had a complete Tx with no complications.    Explain if orders NOT met, was physician notified:STEF      ACES flowsheet faxed to patient unit/ placed in patient chart: yes    Post assessment completed: yes    Report given to: CONRADO Whittington      * Intra-treatment documented Safety Checks include the followin) Access and face visible at all times.     2) All connections and blood lines are secure with no kinks.     3) NVL alarm engaged.     4) Hemosafe device applied (if applicable).     5) No collapse of Arterial or Venous blood chambers.     6) All blood lines / pump segments in the air detectors.

## 2025-07-08 NOTE — PROGRESS NOTES
NAME:  Erinn Kulkarni  YOB: 1980  MEDICAL RECORD NUMBER:  [id number]    Shift Summary: Patient compliant w/ all cares. Titrated cardene throughout shift, cardene off at end of shift.     Family updated: Yes:  Family @ BS start of shift.     Rhythm: Normal Sinus Rhythm     Most recent vitals:   Visit Vitals  BP (!) 142/88   Pulse 89   Temp 98.2 °F (36.8 °C) (Oral)   Resp 10   Ht 1.6 m (5' 3\")   Wt 65.6 kg (144 lb 10 oz)   SpO2 94%   BMI 25.62 kg/m²           No data found.    No data found.      Respiratory support needed (if any):  - RA    Admission weight Weight - Scale: 70.7 kg (155 lb 13.8 oz) (07/06/25 1453)    Today's weight    Wt Readings from Last 1 Encounters:   07/08/25 65.6 kg (144 lb 10 oz)        Christianson need assessed each shift: N/A - no christianson present  UOP >30ml/hr: YES  Last documented BM (in last 48 hrs):  Patient Vitals for the past 48 hrs:   Stool Occurrence   07/07/25 2200 1                Restraints (in use currently or dc'd in last 12 hrs): No    Order current and documentation up to date? Yes    Lines/Drains reviewed @ bedside.  Peripheral IV 07/06/25 Right Antecubital (Active)   Number of days: 1       Peripheral IV 07/06/25 Right Hand (Active)   Number of days: 1       Hemodialysis Central Access - Permanent/Tunneled Right Neck (Active)   Number of days: 21         Drip rates at handoff:    sodium chloride      niCARdipine 1.5 mg/hr (07/07/25 2138)       Lab Data:   CBC:   Recent Labs     07/07/25  0830 07/08/25  0443   WBC 6.5 7.8   HGB 8.5* 8.3*   HCT 25.9* 25.1*   MCV 84.6 84.7    336     BMP:    Recent Labs     07/07/25  0830 07/08/25  0443    136   K 4.1 4.4  4.4   CO2 24 24   BUN 17 24*   CREATININE 7.6* 8.9*     ABG: No results for input(s): \"PHART\", \"SRU4SDW\", \"PO2ART\" in the last 72 hours.    Any consults during the shift? No    Any signed and held orders to be released?  No        4 Eyes Skin Assessment       The patient is being assessed

## 2025-07-08 NOTE — PROGRESS NOTES
Pt transferred to room 5108 via wheelchair with documented belongings. Bedside handoff complete with 5W RN. Pt's parents at bedside.

## 2025-07-09 LAB
ALBUMIN SERPL-MCNC: 3.4 G/DL (ref 3.4–5)
ANION GAP SERPL CALCULATED.3IONS-SCNC: 12 MMOL/L (ref 3–16)
BASOPHILS # BLD: 0.1 K/UL (ref 0–0.2)
BASOPHILS NFR BLD: 0.6 %
BUN SERPL-MCNC: 12 MG/DL (ref 7–20)
CALCIUM SERPL-MCNC: 9 MG/DL (ref 8.3–10.6)
CHLORIDE SERPL-SCNC: 98 MMOL/L (ref 99–110)
CO2 SERPL-SCNC: 26 MMOL/L (ref 21–32)
CREAT SERPL-MCNC: 5.3 MG/DL (ref 0.6–1.1)
DEPRECATED RDW RBC AUTO: 19 % (ref 12.4–15.4)
EOSINOPHIL # BLD: 0.3 K/UL (ref 0–0.6)
EOSINOPHIL NFR BLD: 2.3 %
FERRITIN SERPL IA-MCNC: 382 NG/ML (ref 15–150)
GFR SERPLBLD CREATININE-BSD FMLA CKD-EPI: 10 ML/MIN/{1.73_M2}
GLUCOSE SERPL-MCNC: 89 MG/DL (ref 70–99)
HCT VFR BLD AUTO: 27.7 % (ref 36–48)
HGB BLD-MCNC: 9 G/DL (ref 12–16)
IRON SATN MFR SERPL: 30 % (ref 15–50)
IRON SERPL-MCNC: 67 UG/DL (ref 37–145)
LYMPHOCYTES # BLD: 2.3 K/UL (ref 1–5.1)
LYMPHOCYTES NFR BLD: 21.2 %
MAGNESIUM SERPL-MCNC: 1.73 MG/DL (ref 1.8–2.4)
MCH RBC QN AUTO: 27.6 PG (ref 26–34)
MCHC RBC AUTO-ENTMCNC: 32.6 G/DL (ref 31–36)
MCV RBC AUTO: 84.6 FL (ref 80–100)
MONOCYTES # BLD: 1.1 K/UL (ref 0–1.3)
MONOCYTES NFR BLD: 9.8 %
NEUTROPHILS # BLD: 7.1 K/UL (ref 1.7–7.7)
NEUTROPHILS NFR BLD: 66.1 %
PHOSPHATE SERPL-MCNC: 3.4 MG/DL (ref 2.5–4.9)
PLATELET # BLD AUTO: 350 K/UL (ref 135–450)
PMV BLD AUTO: 9.9 FL (ref 5–10.5)
POTASSIUM SERPL-SCNC: 4.4 MMOL/L (ref 3.5–5.1)
POTASSIUM SERPL-SCNC: 4.4 MMOL/L (ref 3.5–5.1)
RBC # BLD AUTO: 3.28 M/UL (ref 4–5.2)
SODIUM SERPL-SCNC: 136 MMOL/L (ref 136–145)
TIBC SERPL-MCNC: 223 UG/DL (ref 260–445)
WBC # BLD AUTO: 10.8 K/UL (ref 4–11)

## 2025-07-09 PROCEDURE — 85025 COMPLETE CBC W/AUTO DIFF WBC: CPT

## 2025-07-09 PROCEDURE — 6360000002 HC RX W HCPCS: Performed by: INTERNAL MEDICINE

## 2025-07-09 PROCEDURE — 83735 ASSAY OF MAGNESIUM: CPT

## 2025-07-09 PROCEDURE — 83550 IRON BINDING TEST: CPT

## 2025-07-09 PROCEDURE — 6360000002 HC RX W HCPCS: Performed by: HOSPITALIST

## 2025-07-09 PROCEDURE — 6360000002 HC RX W HCPCS

## 2025-07-09 PROCEDURE — 82728 ASSAY OF FERRITIN: CPT

## 2025-07-09 PROCEDURE — 2060000000 HC ICU INTERMEDIATE R&B

## 2025-07-09 PROCEDURE — 6370000000 HC RX 637 (ALT 250 FOR IP): Performed by: INTERNAL MEDICINE

## 2025-07-09 PROCEDURE — 2500000003 HC RX 250 WO HCPCS: Performed by: HOSPITALIST

## 2025-07-09 PROCEDURE — 80069 RENAL FUNCTION PANEL: CPT

## 2025-07-09 PROCEDURE — 90935 HEMODIALYSIS ONE EVALUATION: CPT

## 2025-07-09 PROCEDURE — 6370000000 HC RX 637 (ALT 250 FOR IP): Performed by: HOSPITALIST

## 2025-07-09 PROCEDURE — 94760 N-INVAS EAR/PLS OXIMETRY 1: CPT

## 2025-07-09 PROCEDURE — 5A1D70Z PERFORMANCE OF URINARY FILTRATION, INTERMITTENT, LESS THAN 6 HOURS PER DAY: ICD-10-PCS | Performed by: INTERNAL MEDICINE

## 2025-07-09 PROCEDURE — 83540 ASSAY OF IRON: CPT

## 2025-07-09 PROCEDURE — 2500000003 HC RX 250 WO HCPCS: Performed by: INTERNAL MEDICINE

## 2025-07-09 PROCEDURE — 36415 COLL VENOUS BLD VENIPUNCTURE: CPT

## 2025-07-09 RX ORDER — NIFEDIPINE 30 MG/1
30 TABLET, EXTENDED RELEASE ORAL DAILY
Status: DISCONTINUED | OUTPATIENT
Start: 2025-07-09 | End: 2025-07-11 | Stop reason: HOSPADM

## 2025-07-09 RX ORDER — LABETALOL HYDROCHLORIDE 5 MG/ML
10 INJECTION, SOLUTION INTRAVENOUS EVERY 4 HOURS PRN
Status: DISCONTINUED | OUTPATIENT
Start: 2025-07-09 | End: 2025-07-11 | Stop reason: HOSPADM

## 2025-07-09 RX ADMIN — HYDRALAZINE HYDROCHLORIDE 50 MG: 50 TABLET ORAL at 14:42

## 2025-07-09 RX ADMIN — HEPARIN SODIUM 5000 UNITS: 5000 INJECTION INTRAVENOUS; SUBCUTANEOUS at 05:14

## 2025-07-09 RX ADMIN — NIFEDIPINE 30 MG: 30 TABLET, FILM COATED, EXTENDED RELEASE ORAL at 11:54

## 2025-07-09 RX ADMIN — PROCHLORPERAZINE EDISYLATE 10 MG: 5 INJECTION INTRAMUSCULAR; INTRAVENOUS at 12:16

## 2025-07-09 RX ADMIN — HYDRALAZINE HYDROCHLORIDE 50 MG: 50 TABLET ORAL at 05:14

## 2025-07-09 RX ADMIN — ATORVASTATIN CALCIUM 40 MG: 40 TABLET, FILM COATED ORAL at 08:10

## 2025-07-09 RX ADMIN — LABETALOL HYDROCHLORIDE 200 MG: 200 TABLET, FILM COATED ORAL at 08:10

## 2025-07-09 RX ADMIN — LOSARTAN POTASSIUM 100 MG: 100 TABLET, FILM COATED ORAL at 08:10

## 2025-07-09 RX ADMIN — FERROUS SULFATE TAB EC 324 MG (65 MG FE EQUIVALENT) 324 MG: 324 (65 FE) TABLET DELAYED RESPONSE at 08:10

## 2025-07-09 RX ADMIN — ONDANSETRON 4 MG: 4 TABLET, ORALLY DISINTEGRATING ORAL at 08:10

## 2025-07-09 RX ADMIN — SODIUM CHLORIDE, PRESERVATIVE FREE 10 ML: 5 INJECTION INTRAVENOUS at 08:11

## 2025-07-09 RX ADMIN — LABETALOL HYDROCHLORIDE 200 MG: 200 TABLET, FILM COATED ORAL at 20:17

## 2025-07-09 RX ADMIN — HYDRALAZINE HYDROCHLORIDE 50 MG: 50 TABLET ORAL at 21:27

## 2025-07-09 RX ADMIN — SODIUM CHLORIDE, PRESERVATIVE FREE 10 ML: 5 INJECTION INTRAVENOUS at 21:48

## 2025-07-09 RX ADMIN — HEPARIN SODIUM 5000 UNITS: 5000 INJECTION INTRAVENOUS; SUBCUTANEOUS at 14:42

## 2025-07-09 RX ADMIN — HEPARIN SODIUM 5000 UNITS: 5000 INJECTION INTRAVENOUS; SUBCUTANEOUS at 21:27

## 2025-07-09 RX ADMIN — Medication 40 MG: at 17:49

## 2025-07-09 NOTE — PLAN OF CARE
Problem: Discharge Planning  Goal: Discharge to home or other facility with appropriate resources  7/8/2025 2201 by Ruth Hernandez RN  Outcome: Progressing  Flowsheets (Taken 7/8/2025 2201)  Discharge to home or other facility with appropriate resources:   Identify discharge learning needs (meds, wound care, etc)   Identify barriers to discharge with patient and caregiver     Problem: Cardiovascular - Adult  Goal: Maintains optimal cardiac output and hemodynamic stability  7/8/2025 2201 by Ruth Hernandez RN  Outcome: Progressing  Flowsheets (Taken 7/8/2025 2201)  Maintains optimal cardiac output and hemodynamic stability:   Monitor blood pressure and heart rate   Monitor urine output and notify Licensed Independent Practitioner for values outside of normal range   Assess for signs of decreased cardiac output     Problem: Gastrointestinal - Adult  Goal: Minimal or absence of nausea and vomiting  7/9/2025 0949 by Samira Thompson RN  Outcome: Progressing  7/8/2025 2201 by Ruth Hernandez RN  Outcome: Progressing  Flowsheets (Taken 7/8/2025 2201)  Minimal or absence of nausea and vomiting:   Administer IV fluids as ordered to ensure adequate hydration   Administer ordered antiemetic medications as needed  Goal: Maintains adequate nutritional intake  7/9/2025 0949 by Samira Thompson RN  Outcome: Progressing  7/8/2025 2201 by Ruth Hernandez RN  Outcome: Progressing  Flowsheets (Taken 7/8/2025 2201)  Maintains adequate nutritional intake: Monitor percentage of each meal consumed     Problem: Safety - Adult  Goal: Free from fall injury  7/9/2025 0949 by Samira Thompson RN  Outcome: Progressing  7/8/2025 2201 by Ruth Hernandez RN  Outcome: Progressing  Flowsheets (Taken 7/8/2025 2201)  Free From Fall Injury: Instruct family/caregiver on patient safety     Problem: ABCDS Injury Assessment  Goal: Absence of physical injury  7/9/2025 0949 by Samira Thompson RN  Outcome: Progressing  7/8/2025 2201 by aDvid

## 2025-07-09 NOTE — PROGRESS NOTES
V2.0    [facility] [place]      Name:  Erinn Kulkarni /Age/Sex: 1980  (44 y.o. female)   MRN & CSN:  [id number] & [id number] Encounter Date/Time: 2025 5:41 PM EDT   Location:  [id number] PCP: Diego Licona MD     Attending:Lindsay Lopez MD       Hospital Day: 4    Assessment and Recommendations   Erinn Kulkarni is a 44 y.o. female who presents with Hypertensive urgency      Plan:     HTN emergency  - weaned off cardene gtt  - cont home meds, labetalol/losartan/hydralazine/nifedipine  - nephrology consulted and following    Intractable nausea/vomiting  - cont PPI and anti-emetics  - will consult GI    ESRD  - on HD  - nephro following    Anemia  - monitor hb      Diet ADULT DIET; Regular  Diet NPO Exceptions are: Sips of Water with Meds, Ice Chips   DVT Prophylaxis [] Lovenox, [x]  Heparin, [] SCDs, [] Ambulation,  [] Eliquis, [] Xarelto  [] Coumadin   Code Status Full Code             Personally reviewed Lab Studies and Imaging     Subjective:     Cont to have nausea/vomiting    Review of Systems:      Pertinent positives and negatives discussed in HPI    Objective:     Intake/Output Summary (Last 24 hours) at 2025 1741  Last data filed at 2025 1448  Gross per 24 hour   Intake 980 ml   Output 3500 ml   Net -2520 ml      Vitals:   Vitals:    25 0711 25 1147 25 1157 25 1531   BP: (!) 162/101 (!) 143/97  130/63   Pulse: 99 79 94 59   Resp: 14 16 24 16   Temp: 98.5 °F (36.9 °C) 98.3 °F (36.8 °C)  99.3 °F (37.4 °C)   TempSrc: Oral Oral  Oral   SpO2: 95% 96% 96% 97%   Weight:       Height:             Physical Exam:      General: NAD  Eyes: EOMI  ENT: neck supple  Cardiovascular: Regular rate.  Respiratory: Clear to auscultation  Gastrointestinal: Soft, non tender, BS +  Musculoskeletal: No edema  Skin: warm, dry  Neuro: Alert.  Psych: Mood appropriate.         Medications:   Medications:    NIFEdipine  30 mg Oral Daily    pantoprazole  Trace pericardial effusion similar to prior examination. Organs: Liver, gallbladder, spleen, pancreas, adrenal glands are unremarkable.  Mild atrophy of the bilateral kidneys similar to prior examination however there is a small perinephric hematoma similar in size to images from biopsy 3 weeks prior. GI/Bowel: Colon is unremarkable.  Appendix is normal in size.  Small bowel loops are normal.  No gastric abnormality. Pelvis: Bladder is decompressed.  Uterus and ovaries are unremarkable.  Trace fluid within the cul-de-sac, likely physiologic. Peritoneum/Retroperitoneum: No free air.  Subcentimeter retroperitoneal lymph nodes similar to prior examination, nonspecific.  Scattered calcified plaque in the abdominal aorta, mild.. Bones/Soft Tissues: No acute osseous abnormality.  Mild anasarca of the soft tissues.     Mild pulmonary edema with trace left pleural effusion. New mild fluid within the cul-de-sac, likely physiologic. No acute abnormality of the abdomen or pelvis. Evolving left perinephric hematoma is not significantly changed in size to post biopsy images from 3 weeks prior.       CBC:   Recent Labs     07/07/25  0830 07/08/25  0443 07/09/25  0459   WBC 6.5 7.8 10.8   HGB 8.5* 8.3* 9.0*    336 350     BMP:    Recent Labs     07/07/25  0830 07/08/25  0443 07/09/25  0459    136 136   K 4.1 4.4  4.4 4.4  4.4    101 98*   CO2 24 24 26   BUN 17 24* 12   CREATININE 7.6* 8.9* 5.3*   GLUCOSE 106* 111* 89     Hepatic: No results for input(s): \"AST\", \"ALT\", \"BILITOT\", \"ALKPHOS\" in the last 72 hours.    Invalid input(s): \"ALB\"  Lipids:   Lab Results   Component Value Date/Time    CHOL 177 01/11/2024 09:33 AM    HDL 64 01/11/2024 09:33 AM    TRIG 174 01/11/2024 09:33 AM     Hemoglobin A1C:   Lab Results   Component Value Date/Time    LABA1C 5.9 06/16/2025 06:17 AM     TSH:   Lab Results   Component Value Date/Time    TSH 1.70 09/14/2023 10:18 AM     Troponin: No results found for: \"TROPONINT\"  Lactic

## 2025-07-09 NOTE — PLAN OF CARE
Problem: Discharge Planning  Goal: Discharge to home or other facility with appropriate resources  7/8/2025 2201 by Ruth Hernandez RN  Outcome: Progressing  Flowsheets (Taken 7/8/2025 2201)  Discharge to home or other facility with appropriate resources:   Identify discharge learning needs (meds, wound care, etc)   Identify barriers to discharge with patient and caregiver     Problem: Cardiovascular - Adult  Goal: Maintains optimal cardiac output and hemodynamic stability  7/8/2025 2201 by Ruth Hernandez RN  Outcome: Progressing  Flowsheets (Taken 7/8/2025 2201)  Maintains optimal cardiac output and hemodynamic stability:   Monitor blood pressure and heart rate   Monitor urine output and notify Licensed Independent Practitioner for values outside of normal range   Assess for signs of decreased cardiac output     Problem: Gastrointestinal - Adult  Goal: Minimal or absence of nausea and vomiting  7/8/2025 2201 by Ruth Hernandez RN  Outcome: Progressing  Flowsheets (Taken 7/8/2025 2201)  Minimal or absence of nausea and vomiting:   Administer IV fluids as ordered to ensure adequate hydration   Administer ordered antiemetic medications as needed     Problem: Gastrointestinal - Adult  Goal: Maintains adequate nutritional intake  7/8/2025 2201 by Ruth Hernandez RN  Outcome: Progressing  Flowsheets (Taken 7/8/2025 2201)  Maintains adequate nutritional intake: Monitor percentage of each meal consumed     Problem: Safety - Adult  Goal: Free from fall injury  7/8/2025 2201 by Ruth Hernandez RN  Outcome: Progressing  Flowsheets (Taken 7/8/2025 2201)  Free From Fall Injury: Instruct family/caregiver on patient safety     Problem: ABCDS Injury Assessment  Goal: Absence of physical injury  7/8/2025 2201 by Ruth Hernandez RN  Outcome: Progressing  Flowsheets (Taken 7/8/2025 2201)  Absence of Physical Injury: Implement safety measures based on patient assessment

## 2025-07-09 NOTE — PROGRESS NOTES
Department of Internal Medicine  Nephrology Progress Note          REASON FOR CONSULTATION:  Maintenance dialysis.     HISTORY OF PRESENTING ILLNESS:  44-year-old female with past medical history significant for hypertension; end-stage renal disease, on hemodialysis Tuesday, Thursday, Saturday, follows up with Dr. Villar, came to ER complaining of nausea, vomiting, abdominal pain.  Denies any fever, chills, or rigors.  At the time of presentation, found to have a blood pressure greater than 200 systolic and diastolic greater than 110.  Admitted in ICU, started on a Cardene drip.  Renal consultation has been called for maintenance dialysis as well as blood pressure control.      Events noted , out of ICU .   REVIEW OF SYSTEMS:  C/o nausea / abd pain .     Physical Exam:    VITALS:  BP (!) 143/97   Pulse 94   Temp 98.3 °F (36.8 °C) (Oral)   Resp 24   Ht 1.6 m (5' 3\")   Wt 75.8 kg (167 lb 1.7 oz)   LMP 06/10/2025 (Exact Date)   SpO2 96%   BMI 29.60 kg/m²   24HR INTAKE/OUTPUT:    Intake/Output Summary (Last 24 hours) at 7/9/2025 1458  Last data filed at 7/9/2025 1448  Gross per 24 hour   Intake 980 ml   Output 3500 ml   Net -2520 ml       Constitutional:Resting   Respiratory:  CTA  Gastrointestinal:  No  tenderness.  Normal Bowel Sounds  Cardiovascular:  S1, S2 RRR   Edema:  Lower Extremity has no edema    DATA:    CBC:  Lab Results   Component Value Date/Time    WBC 10.8 07/09/2025 04:59 AM    RBC 3.28 07/09/2025 04:59 AM    HGB 9.0 07/09/2025 04:59 AM    HCT 27.7 07/09/2025 04:59 AM    MCV 84.6 07/09/2025 04:59 AM    MCH 27.6 07/09/2025 04:59 AM    MCHC 32.6 07/09/2025 04:59 AM    RDW 19.0 07/09/2025 04:59 AM     07/09/2025 04:59 AM    MPV 9.9 07/09/2025 04:59 AM     CMP:  Lab Results   Component Value Date/Time     07/09/2025 04:59 AM    K 4.4 07/09/2025 04:59 AM    K 4.4 07/09/2025 04:59 AM    CL 98 07/09/2025 04:59 AM    CO2 26 07/09/2025 04:59 AM    BUN 12 07/09/2025 04:59 AM    CREATININE 5.3

## 2025-07-10 ENCOUNTER — ANESTHESIA EVENT (OUTPATIENT)
Dept: ENDOSCOPY | Age: 45
End: 2025-07-10
Payer: MEDICARE

## 2025-07-10 ENCOUNTER — ANESTHESIA (OUTPATIENT)
Dept: ENDOSCOPY | Age: 45
End: 2025-07-10
Payer: MEDICARE

## 2025-07-10 PROBLEM — R11.2 NAUSEA AND VOMITING IN ADULT: Status: ACTIVE | Noted: 2025-07-10

## 2025-07-10 LAB
ALBUMIN SERPL-MCNC: 3.4 G/DL (ref 3.4–5)
ANION GAP SERPL CALCULATED.3IONS-SCNC: 13 MMOL/L (ref 3–16)
BASOPHILS # BLD: 0.1 K/UL (ref 0–0.2)
BASOPHILS NFR BLD: 0.7 %
BUN SERPL-MCNC: 20 MG/DL (ref 7–20)
CALCIUM SERPL-MCNC: 9.5 MG/DL (ref 8.3–10.6)
CHLORIDE SERPL-SCNC: 99 MMOL/L (ref 99–110)
CO2 SERPL-SCNC: 25 MMOL/L (ref 21–32)
CREAT SERPL-MCNC: 8 MG/DL (ref 0.6–1.1)
DEPRECATED RDW RBC AUTO: 19.2 % (ref 12.4–15.4)
EOSINOPHIL # BLD: 0.2 K/UL (ref 0–0.6)
EOSINOPHIL NFR BLD: 2 %
GFR SERPLBLD CREATININE-BSD FMLA CKD-EPI: 6 ML/MIN/{1.73_M2}
GLUCOSE BLD-MCNC: 95 MG/DL (ref 70–99)
GLUCOSE SERPL-MCNC: 96 MG/DL (ref 70–99)
HCG UR QL: NEGATIVE
HCT VFR BLD AUTO: 28.4 % (ref 36–48)
HGB BLD-MCNC: 9 G/DL (ref 12–16)
LYMPHOCYTES # BLD: 2.5 K/UL (ref 1–5.1)
LYMPHOCYTES NFR BLD: 24.2 %
MCH RBC QN AUTO: 27.3 PG (ref 26–34)
MCHC RBC AUTO-ENTMCNC: 31.7 G/DL (ref 31–36)
MCV RBC AUTO: 86.2 FL (ref 80–100)
MONOCYTES # BLD: 1 K/UL (ref 0–1.3)
MONOCYTES NFR BLD: 10.1 %
NEUTROPHILS # BLD: 6.4 K/UL (ref 1.7–7.7)
NEUTROPHILS NFR BLD: 63 %
PERFORMED ON: NORMAL
PHOSPHATE SERPL-MCNC: 3.7 MG/DL (ref 2.5–4.9)
PLATELET # BLD AUTO: 348 K/UL (ref 135–450)
PMV BLD AUTO: 9.9 FL (ref 5–10.5)
POTASSIUM SERPL-SCNC: 4.3 MMOL/L (ref 3.5–5.1)
RBC # BLD AUTO: 3.29 M/UL (ref 4–5.2)
SODIUM SERPL-SCNC: 137 MMOL/L (ref 136–145)
WBC # BLD AUTO: 10.1 K/UL (ref 4–11)

## 2025-07-10 PROCEDURE — 2500000003 HC RX 250 WO HCPCS: Performed by: HOSPITALIST

## 2025-07-10 PROCEDURE — 6360000002 HC RX W HCPCS: Performed by: INTERNAL MEDICINE

## 2025-07-10 PROCEDURE — 0DB68ZX EXCISION OF STOMACH, VIA NATURAL OR ARTIFICIAL OPENING ENDOSCOPIC, DIAGNOSTIC: ICD-10-PCS | Performed by: INTERNAL MEDICINE

## 2025-07-10 PROCEDURE — 0DB98ZX EXCISION OF DUODENUM, VIA NATURAL OR ARTIFICIAL OPENING ENDOSCOPIC, DIAGNOSTIC: ICD-10-PCS | Performed by: INTERNAL MEDICINE

## 2025-07-10 PROCEDURE — 3700000001 HC ADD 15 MINUTES (ANESTHESIA): Performed by: INTERNAL MEDICINE

## 2025-07-10 PROCEDURE — 6370000000 HC RX 637 (ALT 250 FOR IP): Performed by: HOSPITALIST

## 2025-07-10 PROCEDURE — 6360000002 HC RX W HCPCS: Performed by: HOSPITALIST

## 2025-07-10 PROCEDURE — 84703 CHORIONIC GONADOTROPIN ASSAY: CPT

## 2025-07-10 PROCEDURE — 80069 RENAL FUNCTION PANEL: CPT

## 2025-07-10 PROCEDURE — 2060000000 HC ICU INTERMEDIATE R&B

## 2025-07-10 PROCEDURE — 7100000000 HC PACU RECOVERY - FIRST 15 MIN: Performed by: INTERNAL MEDICINE

## 2025-07-10 PROCEDURE — 88305 TISSUE EXAM BY PATHOLOGIST: CPT

## 2025-07-10 PROCEDURE — 85025 COMPLETE CBC W/AUTO DIFF WBC: CPT

## 2025-07-10 PROCEDURE — 6360000002 HC RX W HCPCS

## 2025-07-10 PROCEDURE — 7100000001 HC PACU RECOVERY - ADDTL 15 MIN: Performed by: INTERNAL MEDICINE

## 2025-07-10 PROCEDURE — 94760 N-INVAS EAR/PLS OXIMETRY 1: CPT

## 2025-07-10 PROCEDURE — 3609012400 HC EGD TRANSORAL BIOPSY SINGLE/MULTIPLE: Performed by: INTERNAL MEDICINE

## 2025-07-10 PROCEDURE — 6370000000 HC RX 637 (ALT 250 FOR IP): Performed by: INTERNAL MEDICINE

## 2025-07-10 PROCEDURE — 90935 HEMODIALYSIS ONE EVALUATION: CPT

## 2025-07-10 PROCEDURE — 88342 IMHCHEM/IMCYTCHM 1ST ANTB: CPT

## 2025-07-10 PROCEDURE — 2709999900 HC NON-CHARGEABLE SUPPLY: Performed by: INTERNAL MEDICINE

## 2025-07-10 PROCEDURE — 3700000000 HC ANESTHESIA ATTENDED CARE: Performed by: INTERNAL MEDICINE

## 2025-07-10 PROCEDURE — 36415 COLL VENOUS BLD VENIPUNCTURE: CPT

## 2025-07-10 PROCEDURE — 2580000003 HC RX 258: Performed by: NURSE ANESTHETIST, CERTIFIED REGISTERED

## 2025-07-10 RX ORDER — PANTOPRAZOLE SODIUM 40 MG/1
40 TABLET, DELAYED RELEASE ORAL
Status: DISCONTINUED | OUTPATIENT
Start: 2025-07-11 | End: 2025-07-11 | Stop reason: HOSPADM

## 2025-07-10 RX ORDER — ONDANSETRON 2 MG/ML
4 INJECTION INTRAMUSCULAR; INTRAVENOUS
Status: DISCONTINUED | OUTPATIENT
Start: 2025-07-10 | End: 2025-07-11 | Stop reason: HOSPADM

## 2025-07-10 RX ORDER — SODIUM CHLORIDE 9 MG/ML
INJECTION, SOLUTION INTRAVENOUS
Status: DISCONTINUED | OUTPATIENT
Start: 2025-07-10 | End: 2025-07-10 | Stop reason: SDUPTHER

## 2025-07-10 RX ORDER — PANTOPRAZOLE SODIUM 40 MG/1
40 TABLET, DELAYED RELEASE ORAL EVERY MORNING
Status: DISCONTINUED | OUTPATIENT
Start: 2025-07-10 | End: 2025-07-10

## 2025-07-10 RX ORDER — PROPOFOL 10 MG/ML
INJECTION, EMULSION INTRAVENOUS
Status: DISCONTINUED | OUTPATIENT
Start: 2025-07-10 | End: 2025-07-10 | Stop reason: SDUPTHER

## 2025-07-10 RX ORDER — LIDOCAINE HYDROCHLORIDE 20 MG/ML
INJECTION, SOLUTION EPIDURAL; INFILTRATION; INTRACAUDAL; PERINEURAL
Status: DISCONTINUED | OUTPATIENT
Start: 2025-07-10 | End: 2025-07-10 | Stop reason: SDUPTHER

## 2025-07-10 RX ADMIN — SODIUM CHLORIDE: 9 INJECTION, SOLUTION INTRAVENOUS at 17:47

## 2025-07-10 RX ADMIN — HEPARIN SODIUM 5000 UNITS: 5000 INJECTION INTRAVENOUS; SUBCUTANEOUS at 05:05

## 2025-07-10 RX ADMIN — ONDANSETRON 4 MG: 4 TABLET, ORALLY DISINTEGRATING ORAL at 12:47

## 2025-07-10 RX ADMIN — HYDRALAZINE HYDROCHLORIDE 50 MG: 50 TABLET ORAL at 21:10

## 2025-07-10 RX ADMIN — LABETALOL HYDROCHLORIDE 200 MG: 200 TABLET, FILM COATED ORAL at 12:47

## 2025-07-10 RX ADMIN — NIFEDIPINE 30 MG: 30 TABLET, FILM COATED, EXTENDED RELEASE ORAL at 12:47

## 2025-07-10 RX ADMIN — EPOETIN ALFA-EPBX 10000 UNITS: 10000 INJECTION, SOLUTION INTRAVENOUS; SUBCUTANEOUS at 11:42

## 2025-07-10 RX ADMIN — ATORVASTATIN CALCIUM 40 MG: 40 TABLET, FILM COATED ORAL at 12:47

## 2025-07-10 RX ADMIN — HEPARIN SODIUM 5000 UNITS: 5000 INJECTION INTRAVENOUS; SUBCUTANEOUS at 15:17

## 2025-07-10 RX ADMIN — PROCHLORPERAZINE EDISYLATE 10 MG: 5 INJECTION INTRAMUSCULAR; INTRAVENOUS at 00:11

## 2025-07-10 RX ADMIN — PROPOFOL 250 MCG/KG/MIN: 10 INJECTION, EMULSION INTRAVENOUS at 17:54

## 2025-07-10 RX ADMIN — HYDRALAZINE HYDROCHLORIDE 50 MG: 50 TABLET ORAL at 15:17

## 2025-07-10 RX ADMIN — LOSARTAN POTASSIUM 100 MG: 100 TABLET, FILM COATED ORAL at 12:47

## 2025-07-10 RX ADMIN — HEPARIN SODIUM 5000 UNITS: 5000 INJECTION INTRAVENOUS; SUBCUTANEOUS at 21:10

## 2025-07-10 RX ADMIN — SODIUM CHLORIDE, PRESERVATIVE FREE 10 ML: 5 INJECTION INTRAVENOUS at 21:12

## 2025-07-10 RX ADMIN — LIDOCAINE HYDROCHLORIDE 100 MG: 20 INJECTION, SOLUTION EPIDURAL; INFILTRATION; INTRACAUDAL; PERINEURAL at 17:53

## 2025-07-10 RX ADMIN — SODIUM CHLORIDE, PRESERVATIVE FREE 10 ML: 5 INJECTION INTRAVENOUS at 12:50

## 2025-07-10 RX ADMIN — LABETALOL HYDROCHLORIDE 200 MG: 200 TABLET, FILM COATED ORAL at 21:10

## 2025-07-10 RX ADMIN — HYDRALAZINE HYDROCHLORIDE 50 MG: 50 TABLET ORAL at 05:05

## 2025-07-10 RX ADMIN — PROPOFOL 100 MG: 10 INJECTION, EMULSION INTRAVENOUS at 17:53

## 2025-07-10 RX ADMIN — PANTOPRAZOLE SODIUM 40 MG: 40 TABLET, DELAYED RELEASE ORAL at 12:47

## 2025-07-10 ASSESSMENT — PAIN - FUNCTIONAL ASSESSMENT
PAIN_FUNCTIONAL_ASSESSMENT: ADULT NONVERBAL PAIN SCALE (NPVS)
PAIN_FUNCTIONAL_ASSESSMENT: 0-10
PAIN_FUNCTIONAL_ASSESSMENT: 0-10

## 2025-07-10 ASSESSMENT — PAIN SCALES - GENERAL: PAINLEVEL_OUTOF10: 0

## 2025-07-10 ASSESSMENT — ENCOUNTER SYMPTOMS: SHORTNESS OF BREATH: 0

## 2025-07-10 NOTE — OP NOTE
Endoscopy Note    Patient: Erinn Kulkarni  : 1980  CSN: [id number]    Procedure: Esophagogastroduodenoscopy with biopsy    Date:  7/10/2025     Surgeon:  Berto Galeano MD     Referring Physician:  Diego Licona MD    Preoperative Diagnosis:  Nausea and vomiting, unspecified vomiting type [R11.2]    Postoperative Diagnosis:  * No post-op diagnosis entered *    Anesthesia:  MAC    EBL: minimal to none.    Indications: This is a 44 y.o. year old female who presents today with nausea, vomiting and upper abdominal pain.    Description of Procedure:  Informed consent was obtained from the patient after explanation of indications, benefits and possible risks and complications of the procedure.  The patient was then taken to the endoscopy suite, placed in the left lateral decubitus position and the above IV sedation was administrered.    The Olympus video endoscope was passed through the hypopharynx into the esophagus. The scope was advanced all the way to the second portion of the duodenum. The scope was slowly withdrawn and mucosa was carefully evaluated including a retroflex view of the proximal stomach.  Findings and interventions are described below.  The patient was decompressed and the scope was then withdrawn.    Gastric or Duodenal ulcer present: Yes    The patient tolerated the procedure well and was taken to the post anesthesia care unit in good condition.  There were no immediate complications.      Impression:  -  1.  Normal-appearing esophagus.  2.  Moderate antritis with (3) 5 mm clean-based antral ulcers.  Biopsies taken in the antrum and fundus of the stomach to rule out H. pylori using the Ally protocol.  3.  Mild duodenitis in the bulb of the duodenum.  Biopsies taken in the bulb and second portion to rule out celiac disease.      Recommendations: -Follow-up on biopsy results.  Proton pump inhibitor therapy twice daily x 8 weeks.  Avoid NSAIDs.  Advance diet.  Office visit in 4

## 2025-07-10 NOTE — H&P
Gastroenterology Note             Pre-operative History and Physical    Patient: Erinn Kulkarni  : 1980  CSN: [id number]    History Obtained From:  patient and/or guardian.     HISTORY OF PRESENT ILLNESS:    The patient is a 44 y.o. female  here for nausea, vomiting, or upper abdominal pain.      Past Medical History:    Past Medical History:   Diagnosis Date    CKD (chronic kidney disease) stage 4, GFR 15-29 ml/min ([facility]) 2025     Past Surgical History:    Past Surgical History:   Procedure Laterality Date    CT BIOPSY RENAL  2025    CT BIOPSY RENAL 2025 [facility] CT    IR TUNNELED CVC PLACE WO SQ PORT/PUMP > 5 YEARS  2025    IR TUNNELED CVC PLACE WO SQ PORT/PUMP > 5 YEARS 2025 [facility] SPECIAL PROCEDURES     Medications Prior to Admission:   No current facility-administered medications on file prior to encounter.     Current Outpatient Medications on File Prior to Encounter   Medication Sig Dispense Refill    hydrALAZINE (APRESOLINE) 50 MG tablet Take 1 tablet by mouth in the morning and at bedtime 60 tablet 3    ferrous sulfate 324 (65 Fe) MG EC tablet Take 1 tablet by mouth daily (with breakfast) 30 tablet 0    losartan (COZAAR) 100 MG tablet Take 1 tablet by mouth daily 30 tablet 3    labetalol (NORMODYNE) 200 MG tablet Take 1 tablet by mouth 2 times daily 60 tablet 0    atorvastatin (LIPITOR) 40 MG tablet TAKE 1 TABLET BY MOUTH DAILY 90 tablet 5        Allergies:  Patient has no known allergies.      Social History:   Social History     Tobacco Use    Smoking status: Never    Smokeless tobacco: Never   Substance Use Topics    Alcohol use: No     Family History:   Family History   Problem Relation Age of Onset    Cancer Father     High Blood Pressure Father     Cancer Sister         breast cancer    Cancer Brother        PHYSICAL EXAM:      BP (!) 151/89   Pulse 79   Temp 98.3 °F (36.8 °C) (Temporal)   Resp 18   Ht 1.6 m (5' 3\")   Wt 72.5 kg (159 lb 13.3 oz)   LMP

## 2025-07-10 NOTE — PROGRESS NOTES
Department of Internal Medicine  Nephrology Progress Note          REASON FOR CONSULTATION:  Maintenance dialysis.     HISTORY OF PRESENTING ILLNESS:  44-year-old female with past medical history significant for hypertension; end-stage renal disease, on hemodialysis Tuesday, Thursday, Saturday, follows up with Dr. Villar, came to ER complaining of nausea, vomiting, abdominal pain.  Denies any fever, chills, or rigors.  At the time of presentation, found to have a blood pressure greater than 200 systolic and diastolic greater than 110.  Admitted in ICU, started on a Cardene drip.  Renal consultation has been called for maintenance dialysis as well as blood pressure control.      Seen post HD.    REVIEW OF SYSTEMS:  No nausea / abd pain .     Physical Exam:    VITALS:  BP (!) 156/92   Pulse 94   Temp 97.9 °F (36.6 °C)   Resp 18   Ht 1.6 m (5' 3\")   Wt 72.5 kg (159 lb 13.3 oz)   LMP 06/10/2025 (Exact Date)   SpO2 96%   BMI 28.31 kg/m²   24HR INTAKE/OUTPUT:    Intake/Output Summary (Last 24 hours) at 7/10/2025 1401  Last data filed at 7/9/2025 1758  Gross per 24 hour   Intake 480 ml   Output --   Net 480 ml       Constitutional:Resting   Respiratory:  CTA  Gastrointestinal:  No  tenderness.  Normal Bowel Sounds  Cardiovascular:  S1, S2 RRR   Edema:  Lower Extremity has no edema    DATA:    CBC:  Lab Results   Component Value Date/Time    WBC 10.1 07/10/2025 06:13 AM    RBC 3.29 07/10/2025 06:13 AM    HGB 9.0 07/10/2025 06:13 AM    HCT 28.4 07/10/2025 06:13 AM    MCV 86.2 07/10/2025 06:13 AM    MCH 27.3 07/10/2025 06:13 AM    MCHC 31.7 07/10/2025 06:13 AM    RDW 19.2 07/10/2025 06:13 AM     07/10/2025 06:13 AM    MPV 9.9 07/10/2025 06:13 AM     CMP:  Lab Results   Component Value Date/Time     07/10/2025 06:13 AM    K 4.3 07/10/2025 06:13 AM    K 4.4 07/09/2025 04:59 AM    CL 99 07/10/2025 06:13 AM    CO2 25 07/10/2025 06:13 AM    BUN 20 07/10/2025 06:13 AM    CREATININE 8.0 07/10/2025 06:13 AM

## 2025-07-10 NOTE — PROGRESS NOTES
Patient admitted to PACU # 5 from [facility] at 1807 post EGD biopsy per Dr. Galeano.  Attached to PACU monitoring system and report received from anesthesia provider.  Patient was reported to be hemodynamically stable during procedure.  Patient unresponsive d/t level of sedation. Pt shows no signs of pain.

## 2025-07-10 NOTE — ANESTHESIA PRE PROCEDURE
Department of Anesthesiology  Preprocedure Note       Name:  Erinn Kulkarni   Age:  44 y.o.  :  1980                                          MRN:  [id number]         Date:  7/10/2025      Surgeon: Surgeon(s):  Berto Galeano MD    Procedure: Procedure(s):  ESOPHAGOGASTRODUODENOSCOPY    Medications prior to admission:   Prior to Admission medications    Medication Sig Start Date End Date Taking? Authorizing Provider   hydrALAZINE (APRESOLINE) 50 MG tablet Take 1 tablet by mouth in the morning and at bedtime 7/3/25  Yes Bruno Villar MD   ferrous sulfate 324 (65 Fe) MG EC tablet Take 1 tablet by mouth daily (with breakfast) 25  Yes Lg Osman MD   losartan (COZAAR) 100 MG tablet Take 1 tablet by mouth daily 25  Yes Lg Osman MD   labetalol (NORMODYNE) 200 MG tablet Take 1 tablet by mouth 2 times daily 25  Yes Lg Osman MD   atorvastatin (LIPITOR) 40 MG tablet TAKE 1 TABLET BY MOUTH DAILY 24  Yes Diego Licona MD       Current medications:    Current Facility-Administered Medications   Medication Dose Route Frequency Provider Last Rate Last Admin   • pantoprazole (PROTONIX) tablet 40 mg  40 mg Oral Lindsay Power MD   40 mg at 07/10/25 1247   • NIFEdipine (PROCARDIA XL) extended release tablet 30 mg  30 mg Oral Daily Yomi Nelson MD   30 mg at 07/10/25 1247   • labetalol (NORMODYNE;TRANDATE) injection 10 mg  10 mg IntraVENous Q4H PRN Yomi Nelson MD       • prochlorperazine (COMPAZINE) injection 10 mg  10 mg IntraVENous Q6H PRN Lg Osman MD   10 mg at 07/10/25 0011   • epoetin monica-epbx (RETACRIT) injection 10,000 Units  10,000 Units IntraVENous Once per day on  Yomi Nelson MD   10,000 Units at 07/10/25 1142   • heparin (porcine) injection 3,200 Units  3,200 Units IntraCATHeter PRN Yomi Nelson MD   3,200 Units at 25 3331   • hydrALAZINE (APRESOLINE) tablet 50 mg  50 mg Oral 3 times per day Yomi Nelson MD   50

## 2025-07-10 NOTE — PROGRESS NOTES
Treatment time: 3.5 hrs    Net UF: 1000 ml    Pre weight: 72.5 kg  Post weight: 71.5 kg  EDW: 74.5 kg    Access used: Rt CW TDC  Access function: Well tolerated, 350 BFR    Medications or blood products given: Retacrit 10,000 units    Regular outpatient schedule: [facility]    Summary of response to treatment: Pt tolerated well. Pt remained stable throughout entire treatment and upon exiting the hemodialysis suite. Report given to Nicole Mix, RN

## 2025-07-10 NOTE — CARE COORDINATION
Discharge Planning:    Per chart review, patient admitted for hypertensive urgency. Patient has been weaned off of cardene gtt and is on home medications. Watch for nephrology recommendations.    At this time, patient plans to return home with family support when medically stable for discharge. Patient to resume OP HD at [facility]. Sister provides transportation and will provide transportation at discharge. CM to continue to follow for updates.    Electronically signed by ISABEL DAVID RN on 7/10/2025 at 11:01 AM

## 2025-07-10 NOTE — PROGRESS NOTES
PACU Transfer Note    Vitals:    07/10/25 1820   BP: (!) 148/91   Pulse: 86   Resp: 18   Temp: 97.8 °F (36.6 °C)   SpO2: 97%       In: 100 [I.V.:100]  Out: -     Pain assessment:  none  Pain Level: 0    Report given to Receiving unit, Nicole CAMP.    7/10/2025 6:25 PM

## 2025-07-10 NOTE — PLAN OF CARE
Problem: Discharge Planning  Goal: Discharge to home or other facility with appropriate resources  7/10/2025 0101 by Ruth Hernandez RN  Outcome: Progressing  Flowsheets (Taken 7/10/2025 0101)  Discharge to home or other facility with appropriate resources: Identify barriers to discharge with patient and caregiver     Problem: Cardiovascular - Adult  Goal: Maintains optimal cardiac output and hemodynamic stability  7/10/2025 1016 by Samira Thompson RN  Outcome: Progressing  7/10/2025 0101 by Ruth Hernandez RN  Outcome: Progressing  Flowsheets (Taken 7/10/2025 0101)  Maintains optimal cardiac output and hemodynamic stability:   Monitor blood pressure and heart rate   Monitor urine output and notify Licensed Independent Practitioner for values outside of normal range     Problem: Gastrointestinal - Adult  Goal: Minimal or absence of nausea and vomiting  7/10/2025 1016 by Samira Thompson RN  Outcome: Progressing  7/10/2025 0101 by Ruth Hernandez RN  Outcome: Progressing  Flowsheets (Taken 7/10/2025 0101)  Minimal or absence of nausea and vomiting: Administer ordered antiemetic medications as needed  Goal: Maintains adequate nutritional intake  7/10/2025 1016 by Samira Thompson RN  Outcome: Progressing  7/10/2025 0101 by Ruth Hernandez RN  Outcome: Progressing  Flowsheets (Taken 7/10/2025 0101)  Maintains adequate nutritional intake: Monitor percentage of each meal consumed     Problem: Safety - Adult  Goal: Free from fall injury  7/10/2025 1016 by Samira Thompson RN  Outcome: Progressing  7/10/2025 0101 by Ruth Hernandez RN  Outcome: Progressing  Flowsheets (Taken 7/10/2025 0101)  Free From Fall Injury: Instruct family/caregiver on patient safety     Problem: ABCDS Injury Assessment  Goal: Absence of physical injury  7/10/2025 0101 by Ruth Hernandez RN  Outcome: Progressing  Flowsheets (Taken 7/10/2025 0101)  Absence of Physical Injury: Implement safety measures based on patient assessment     Problem:  Pain  Goal: Verbalizes/displays adequate comfort level or baseline comfort level  Outcome: Progressing

## 2025-07-10 NOTE — PLAN OF CARE
Problem: Discharge Planning  Goal: Discharge to home or other facility with appropriate resources  Outcome: Progressing  Flowsheets (Taken 7/10/2025 0101)  Discharge to home or other facility with appropriate resources: Identify barriers to discharge with patient and caregiver     Problem: Cardiovascular - Adult  Goal: Maintains optimal cardiac output and hemodynamic stability  Outcome: Progressing  Flowsheets (Taken 7/10/2025 0101)  Maintains optimal cardiac output and hemodynamic stability:   Monitor blood pressure and heart rate   Monitor urine output and notify Licensed Independent Practitioner for values outside of normal range     Problem: Gastrointestinal - Adult  Goal: Minimal or absence of nausea and vomiting  Outcome: Progressing  Flowsheets (Taken 7/10/2025 0101)  Minimal or absence of nausea and vomiting: Administer ordered antiemetic medications as needed     Problem: Gastrointestinal - Adult  Goal: Maintains adequate nutritional intake  Outcome: Progressing  Flowsheets (Taken 7/10/2025 0101)  Maintains adequate nutritional intake: Monitor percentage of each meal consumed     Problem: Safety - Adult  Goal: Free from fall injury  Outcome: Progressing  Flowsheets (Taken 7/10/2025 0101)  Free From Fall Injury: Instruct family/caregiver on patient safety     Problem: ABCDS Injury Assessment  Goal: Absence of physical injury  Outcome: Progressing  Flowsheets (Taken 7/10/2025 0101)  Absence of Physical Injury: Implement safety measures based on patient assessment

## 2025-07-10 NOTE — FLOWSHEET NOTE
07/10/25 1520   Treatment Team Notification   Reason for Communication Review case;Patient/Family request  (Patient asking if she can discharge after egd.)   Name of Team Member Notified Dr. Lopez   Treatment Team Role Attending Provider   Method of Communication Secure Message   Response Waiting for response   Notification Time 1521     Electronically signed by Nicole Mix RN on 7/10/2025 at 3:21 PM

## 2025-07-10 NOTE — PROGRESS NOTES
Pt now fully alert, answering questions, and following commands appropriately. Pt denies complaint of pain. Pt tolerating PO ice chips without difficulty.

## 2025-07-10 NOTE — PROGRESS NOTES
V2.0    [facility] [place]      Name:  Erinn Kulkarni /Age/Sex: 1980  (44 y.o. female)   MRN & CSN:  [id number] & [id number] Encounter Date/Time: 7/10/2025 5:54 PM EDT   Location:  [id number] PCP: Diego Licona MD     Attending:Lindsay Lopez MD       Hospital Day: 5    Assessment and Recommendations   Erinn Kulkarni is a 44 y.o. female who presents with Hypertensive urgency      Plan:       HTN emergency  - weaned off cardene gtt  - cont home meds, labetalol/losartan/hydralazine/nifedipine  - nephrology consulted and following  - BP improved     Intractable nausea/vomiting  - cont PPI and anti-emetics  - consulted GI  - on PPI, plan EGD today     ESRD  - on HD  - nephro following     Anemia  - monitor hb     Dc when ok with nephro and GI    Diet Diet NPO Exceptions are: Sips of Water with Meds, Ice Chips   DVT Prophylaxis [] Lovenox, [x]  Heparin, [] SCDs, [] Ambulation,  [] Eliquis, [] Xarelto  [] Coumadin   Code Status Full Code             Personally reviewed Lab Studies and Imaging         Subjective:     Awaiting EGD today, BP improved, denies any CP      Review of Systems:      Pertinent positives and negatives discussed in HPI    Objective:     Intake/Output Summary (Last 24 hours) at 7/10/2025 1754  Last data filed at 2025 1758  Gross per 24 hour   Intake 360 ml   Output --   Net 360 ml      Vitals:   Vitals:    07/10/25 1247 07/10/25 1404 07/10/25 1515 07/10/25 1622   BP: (!) 156/92 (!) 151/88 (!) 150/94 (!) 151/89   Pulse: 94 83 83 79   Resp:  16 16 18   Temp:  98.2 °F (36.8 °C) 98.3 °F (36.8 °C) 98.3 °F (36.8 °C)   TempSrc:  Oral Oral Temporal   SpO2:  97% 96% 97%   Weight:       Height:             Physical Exam:      General: NAD  Eyes: EOMI  ENT: neck supple  Cardiovascular: Regular rate.  Respiratory: Clear to auscultation  Gastrointestinal: Soft, non tender  Genitourinary: no suprapubic tenderness  Musculoskeletal: No edema  Skin: warm, dry  Neuro:  interstitial/ground-glass opacities in the bilateral lower lungs consistent with mild pulmonary edema.  Heart is mildly enlarged with tunnel dialysis catheter terminating in the right atrium.  Trace pericardial effusion similar to prior examination. Organs: Liver, gallbladder, spleen, pancreas, adrenal glands are unremarkable.  Mild atrophy of the bilateral kidneys similar to prior examination however there is a small perinephric hematoma similar in size to images from biopsy 3 weeks prior. GI/Bowel: Colon is unremarkable.  Appendix is normal in size.  Small bowel loops are normal.  No gastric abnormality. Pelvis: Bladder is decompressed.  Uterus and ovaries are unremarkable.  Trace fluid within the cul-de-sac, likely physiologic. Peritoneum/Retroperitoneum: No free air.  Subcentimeter retroperitoneal lymph nodes similar to prior examination, nonspecific.  Scattered calcified plaque in the abdominal aorta, mild.. Bones/Soft Tissues: No acute osseous abnormality.  Mild anasarca of the soft tissues.     Mild pulmonary edema with trace left pleural effusion. New mild fluid within the cul-de-sac, likely physiologic. No acute abnormality of the abdomen or pelvis. Evolving left perinephric hematoma is not significantly changed in size to post biopsy images from 3 weeks prior.       CBC:   Recent Labs     07/08/25  0443 07/09/25  0459 07/10/25  0613   WBC 7.8 10.8 10.1   HGB 8.3* 9.0* 9.0*    350 348     BMP:    Recent Labs     07/08/25  0443 07/09/25  0459 07/10/25  0613    136 137   K 4.4  4.4 4.4  4.4 4.3    98* 99   CO2 24 26 25   BUN 24* 12 20   CREATININE 8.9* 5.3* 8.0*   GLUCOSE 111* 89 96     Hepatic: No results for input(s): \"AST\", \"ALT\", \"BILITOT\", \"ALKPHOS\" in the last 72 hours.    Invalid input(s): \"ALB\"  Lipids:   Lab Results   Component Value Date/Time    CHOL 177 01/11/2024 09:33 AM    HDL 64 01/11/2024 09:33 AM    TRIG 174 01/11/2024 09:33 AM     Hemoglobin A1C:   Lab Results

## 2025-07-10 NOTE — PROGRESS NOTES
Discussed plan for EGD with patient.  Patient agreeable to procedure, but requested her sister be contacted for consent.  Call placed to sister, Christa, and left message for return call.    Electronically signed by Nicole Mix RN on 7/10/2025 at 9:55 AM    Received return call from Christa who gave consent for procedure.  Verified x 2 nurses.     Electronically signed by Nicole Mix RN on 7/10/2025 at 9:58 AM     Detail Level: Zone Providers To Request Records From: F Preface: I requested the records from the following providers.

## 2025-07-11 VITALS
HEIGHT: 63 IN | RESPIRATION RATE: 16 BRPM | SYSTOLIC BLOOD PRESSURE: 127 MMHG | BODY MASS INDEX: 28.08 KG/M2 | TEMPERATURE: 98.1 F | DIASTOLIC BLOOD PRESSURE: 78 MMHG | WEIGHT: 158.5 LBS | HEART RATE: 87 BPM | OXYGEN SATURATION: 97 %

## 2025-07-11 LAB
ALBUMIN SERPL-MCNC: 3.5 G/DL (ref 3.4–5)
ANION GAP SERPL CALCULATED.3IONS-SCNC: 13 MMOL/L (ref 3–16)
BUN SERPL-MCNC: 12 MG/DL (ref 7–20)
CALCIUM SERPL-MCNC: 8.9 MG/DL (ref 8.3–10.6)
CHLORIDE SERPL-SCNC: 104 MMOL/L (ref 99–110)
CO2 SERPL-SCNC: 28 MMOL/L (ref 21–32)
CREAT SERPL-MCNC: 5.3 MG/DL (ref 0.6–1.1)
DEPRECATED RDW RBC AUTO: 19.5 % (ref 12.4–15.4)
GFR SERPLBLD CREATININE-BSD FMLA CKD-EPI: 10 ML/MIN/{1.73_M2}
GLUCOSE SERPL-MCNC: 97 MG/DL (ref 70–99)
HCT VFR BLD AUTO: 29.6 % (ref 36–48)
HGB BLD-MCNC: 9.2 G/DL (ref 12–16)
MCH RBC QN AUTO: 27.1 PG (ref 26–34)
MCHC RBC AUTO-ENTMCNC: 31.1 G/DL (ref 31–36)
MCV RBC AUTO: 87 FL (ref 80–100)
PHOSPHATE SERPL-MCNC: 3.5 MG/DL (ref 2.5–4.9)
PLATELET # BLD AUTO: 339 K/UL (ref 135–450)
PMV BLD AUTO: 9.9 FL (ref 5–10.5)
POTASSIUM SERPL-SCNC: 4.6 MMOL/L (ref 3.5–5.1)
RBC # BLD AUTO: 3.4 M/UL (ref 4–5.2)
SODIUM SERPL-SCNC: 145 MMOL/L (ref 136–145)
WBC # BLD AUTO: 8.7 K/UL (ref 4–11)

## 2025-07-11 PROCEDURE — 85027 COMPLETE CBC AUTOMATED: CPT

## 2025-07-11 PROCEDURE — 36415 COLL VENOUS BLD VENIPUNCTURE: CPT

## 2025-07-11 PROCEDURE — 2500000003 HC RX 250 WO HCPCS: Performed by: HOSPITALIST

## 2025-07-11 PROCEDURE — 6370000000 HC RX 637 (ALT 250 FOR IP): Performed by: INTERNAL MEDICINE

## 2025-07-11 PROCEDURE — 94760 N-INVAS EAR/PLS OXIMETRY 1: CPT

## 2025-07-11 PROCEDURE — 6360000002 HC RX W HCPCS: Performed by: HOSPITALIST

## 2025-07-11 PROCEDURE — 80069 RENAL FUNCTION PANEL: CPT

## 2025-07-11 PROCEDURE — 6370000000 HC RX 637 (ALT 250 FOR IP): Performed by: HOSPITALIST

## 2025-07-11 RX ORDER — PANTOPRAZOLE SODIUM 40 MG/1
40 TABLET, DELAYED RELEASE ORAL
Qty: 60 TABLET | Refills: 2 | Status: SHIPPED | OUTPATIENT
Start: 2025-07-11

## 2025-07-11 RX ORDER — NIFEDIPINE 30 MG/1
30 TABLET, EXTENDED RELEASE ORAL DAILY
Qty: 30 TABLET | Refills: 3 | Status: SHIPPED | OUTPATIENT
Start: 2025-07-11

## 2025-07-11 RX ADMIN — HYDRALAZINE HYDROCHLORIDE 50 MG: 50 TABLET ORAL at 05:47

## 2025-07-11 RX ADMIN — PANTOPRAZOLE SODIUM 40 MG: 40 TABLET, DELAYED RELEASE ORAL at 05:47

## 2025-07-11 RX ADMIN — NIFEDIPINE 30 MG: 30 TABLET, FILM COATED, EXTENDED RELEASE ORAL at 09:34

## 2025-07-11 RX ADMIN — ATORVASTATIN CALCIUM 40 MG: 40 TABLET, FILM COATED ORAL at 09:34

## 2025-07-11 RX ADMIN — LABETALOL HYDROCHLORIDE 200 MG: 200 TABLET, FILM COATED ORAL at 09:34

## 2025-07-11 RX ADMIN — HEPARIN SODIUM 5000 UNITS: 5000 INJECTION INTRAVENOUS; SUBCUTANEOUS at 05:47

## 2025-07-11 RX ADMIN — LOSARTAN POTASSIUM 100 MG: 100 TABLET, FILM COATED ORAL at 09:34

## 2025-07-11 RX ADMIN — SODIUM CHLORIDE, PRESERVATIVE FREE 10 ML: 5 INJECTION INTRAVENOUS at 09:35

## 2025-07-11 NOTE — PLAN OF CARE
Problem: Discharge Planning  Goal: Discharge to home or other facility with appropriate resources  Outcome: Completed     Problem: Cardiovascular - Adult  Goal: Maintains optimal cardiac output and hemodynamic stability  Outcome: Completed     Problem: Gastrointestinal - Adult  Goal: Minimal or absence of nausea and vomiting  Outcome: Completed  Goal: Maintains adequate nutritional intake  Outcome: Completed     Problem: Safety - Adult  Goal: Free from fall injury  Outcome: Completed     Problem: ABCDS Injury Assessment  Goal: Absence of physical injury  Outcome: Completed     Problem: Pain  Goal: Verbalizes/displays adequate comfort level or baseline comfort level  Outcome: Completed

## 2025-07-11 NOTE — ANESTHESIA POSTPROCEDURE EVALUATION
4.6                 07/11/2025 06:15 AM        K                        4.4                 07/09/2025 04:59 AM        CL                       104                 07/11/2025 06:15 AM        CO2                      28                  07/11/2025 06:15 AM        BUN                      12                  07/11/2025 06:15 AM        CREATININE               5.3 (HH)            07/11/2025 06:15 AM        GLUCOSE                  97                  07/11/2025 06:15 AM   COAGS  Lab Results       Component                Value               Date/Time                  PROTIME                  15.6 (H)            06/16/2025 07:55 AM        INR                      1.21 (H)            06/16/2025 07:55 AM     Intake & Output:  @24HRIO@    Nausea & Vomiting:  No    Level of Consciousness:  Awake    Pain Assessment:  Adequate analgesia    Anesthesia Complications:  No apparent anesthetic complications    SUMMARY      Vital signs stable  OK to discharge from Stage I post anesthesia care.  Care transferred from Anesthesiology department on discharge from perioperative area   Pain management: satisfactory to patient    No notable events documented.

## 2025-07-11 NOTE — CONSULTS
[facility]          [address] [place] [address]                              CONSULTATION      PATIENT NAME: ESTHELA SHANKAR        : 1980  MED REC NO: [id number]                      ROOM: [place] [address]  ACCOUNT NO: [id number]                       ADMIT DATE: 2025  PROVIDER: Yomi Nelson MD      CONSULT DATE: 2025    REASON FOR CONSULTATION:  Maintenance dialysis.    HISTORY OF PRESENTING ILLNESS:  44-year-old female with past medical history significant for hypertension; end-stage renal disease, on hemodialysis Tuesday, Thursday, Saturday, follows up with Dr. Villar, came to ER complaining of nausea, vomiting, abdominal pain.  Denies any fever, chills, or rigors.  At the time of presentation, found to have a blood pressure greater than 200 systolic and diastolic greater than 110.  Admitted in ICU, started on a Cardene drip.  Renal consultation has been called for maintenance dialysis as well as blood pressure control.    At the time of consultation, the patient denies any chest pain, shortness of breath.  Admits improved GI symptoms.  Denies any associated symptoms.    PAST MEDICAL HISTORY:    1. Hypertension.   2. End-stage renal disease, on hemodialysis 3 times a week.  3. Anemia of chronic kidney disease.  4. Renal osteodystrophy.  5. Hypercholesterolemia.    MEDICATIONS:  Include labetalol, Lipitor, ferrous sulfate, losartan, hydralazine.    ALLERGIES:  NONE.      PAST SURGICAL HISTORY:    1. Status post tunneled dialysis catheter placement.  2. Status post kidney biopsy.    SOCIAL HISTORY:  Denies any cigarette smoking or alcohol abuse.    FAMILY HISTORY:  Positive for hypertension.    REVIEW OF SYSTEMS:  Complaining of headache with nausea, vomiting, abdominal pain.  Denies any fever, chills, rigors.  Denies any chest pain, shortness of breath or dyspnea on exertion.  Feeling a bit nervous and anxious.    PHYSICAL 
                  [facility]          [address] [place] [address]                              CONSULTATION      PATIENT NAME: ESTHELA SHANKAR        : 1980  MED REC NO: [id number]                      ROOM: [place] [address]  ACCOUNT NO: [id number]                       ADMIT DATE: 2025  PROVIDER: Yomi Nelson MD      CONSULT DATE: 2025    PROGRESS NOTE:      REASON FOR CONSULTATION:  Maintenance dialysis.    Overnight events noted.  Review of systems negative.  Labs and blood pressure noted.    PHYSICAL EXAMINATION:  GENERAL:  Resting comfortably.  CHEST:  Clear to auscultation.  CVS:  S1, S2 audible.  Regular rate and rhythm.  ABDOMEN:  Benign.  EXTREMITIES:  Negative edema.    LABORATORY DATA:  Reviewed.    ASSESSMENT AND PLAN:    1. Hypertension.  Blood pressure much better controlled.  No change in medication.  2. End-stage renal disease, on hemodialysis Tuesday, Thursday, Saturday. Next dialysis treatment tomorrow.  3. Anemia of chronic kidney disease.  Hemoglobin noted.  Continue Procrit with dialysis.  4. Renal osteodystrophy.  No change in medication.  Discharge plan per admitting team.  The patient advised to continue with dialysis and follow up with the primary nephrologist.          YOMI NELSON MD      D:  2025 14:28:41     T:  2025 15:34:08     DIANA/JESS  Job #:  [id number]     Doc#:  [id number]     
GASTROENTEROLOGY INPATIENT CONSULTATION        IDENTIFYING DATA/REASON FOR CONSULTATION   PATIENT:  Erinn Kulkarni  MRN:  [id number]  ADMIT DATE: 7/6/2025  TIME OF EVALUATION: 7/9/2025 2:25 PM  HOSPITAL STAY:   LOS: 2 days     REASON FOR CONSULTATION:  nausea, dry heaves    HISTORY OF PRESENT ILLNESS   Erinn Kulkarni is a 44 y.o. female with a PMH of ESRD on HD, HTN who presented on 7/6/2025 with nausea, vomiting, abdominal pain.  She was admitted with hypertensive urgency.  GI consulted regarding persistent nausea and vomiting.    Patient reports her symptoms started after she came into the hospital however according to the chart notes she had told other providers that has been occurring for 2 to 3 weeks prior.  She reports of diffuse abdominal pain.  She denies seeing any blood in her vomit.  She denies any prior similar episodes.  She denies any marijuana use.  She denies any recent sick contacts    Blood work shows normal LFTs, normal lipase    CT of the abdomen and pelvis showed no acute abnormalities of the abdomen or pelvis.    She states she ate lunch today which consisted of a turkey wrap and grapes and has been able to keep down thus far.  She states Phenergan is controlling her nausea      Prior Endoscopic Evaluations: none    PAST MEDICAL, SURGICAL, FAMILY, and SOCIAL HISTORY     Past Medical History:   Diagnosis Date    CKD (chronic kidney disease) stage 4, GFR 15-29 ml/min ([facility]) 6/14/2025     Past Surgical History:   Procedure Laterality Date    CT BIOPSY RENAL  6/20/2025    CT BIOPSY RENAL 6/20/2025 [facility] CT    IR TUNNELED CVC PLACE WO SQ PORT/PUMP > 5 YEARS  6/16/2025    IR TUNNELED CVC PLACE WO SQ PORT/PUMP > 5 YEARS 6/16/2025 [facility] SPECIAL PROCEDURES     Family History   Problem Relation Age of Onset    Cancer Father     High Blood Pressure Father     Cancer Sister         breast cancer    Cancer Brother      Social History     Socioeconomic History    Marital status: Single     Spouse 
DISCHARGE

## 2025-07-11 NOTE — PLAN OF CARE
Problem: Discharge Planning  Goal: Discharge to home or other facility with appropriate resources  Outcome: Progressing     Problem: Cardiovascular - Adult  Goal: Maintains optimal cardiac output and hemodynamic stability  7/10/2025 2138 by Casa Stack RN  Outcome: Progressing  7/10/2025 1016 by Samira Thompson RN  Outcome: Progressing     Problem: Gastrointestinal - Adult  Goal: Minimal or absence of nausea and vomiting  7/10/2025 2138 by Casa Stack RN  Outcome: Progressing  7/10/2025 1016 by Samira Thompson RN  Outcome: Progressing  Goal: Maintains adequate nutritional intake  7/10/2025 2138 by Casa Stack RN  Outcome: Progressing  7/10/2025 1016 by Samira Thompson RN  Outcome: Progressing     Problem: Safety - Adult  Goal: Free from fall injury  7/10/2025 2138 by Casa Stack RN  Outcome: Progressing  7/10/2025 1016 by Samira Thompson RN  Outcome: Progressing     Problem: ABCDS Injury Assessment  Goal: Absence of physical injury  Outcome: Progressing     Problem: Pain  Goal: Verbalizes/displays adequate comfort level or baseline comfort level  7/10/2025 2138 by Casa Stack RN  Outcome: Progressing  7/10/2025 1016 by Samira Thompson RN  Outcome: Progressing

## 2025-07-11 NOTE — CARE COORDINATION
CASE MANAGEMENT DISCHARGE SUMMARY:    DISCHARGE DATE: 7/11/2025    DISCHARGED TO: home     [facility]  [address] [place] [address]  Phone: [phone]  Fax: [phone]    TRANSPORTATION: via Christa malik             TIME: TBD by patient and bedside RN    COMMENTS: Patient, family, bedside RN, OP HD clinic aware of discharge plan and timeline.    Electronically signed by ISABEL DAVID RN on 7/11/2025 at 12:15 PM      AVS faxed to [facility] ([phone]) at this time.     Electronically signed by ISABEL DAVID RN on 7/11/2025 at 12:29 PM

## 2025-07-11 NOTE — PROGRESS NOTES
Discharge orders acknowledged by RN . Discharge teaching completed with pt and family. AVS reviewed and all questions answered. Medication regimen reviewed and pt understands schedule. E-scripts sent to pt RX. Follow up appointments also reviewed with pt and resources given for discharge. IV removed. Bedside monitor removed from pt. Pt vitals WNL. Pt discharged with all belongings to home with family. Pt declined w/c transport and was escorted to elevators. No complications. Electronically signed by Cathleen Rawls RN on 7/11/2025 at 1:23 PM

## 2025-07-14 ENCOUNTER — CARE COORDINATION (OUTPATIENT)
Dept: CASE MANAGEMENT | Age: 45
End: 2025-07-14

## 2025-07-14 DIAGNOSIS — I16.1 HYPERTENSIVE EMERGENCY: Primary | ICD-10-CM

## 2025-07-14 PROCEDURE — 1111F DSCHRG MED/CURRENT MED MERGE: CPT | Performed by: INTERNAL MEDICINE

## 2025-07-14 NOTE — CARE COORDINATION
Care Transitions Note    Initial Call - Call within 2 business days of discharge: Yes    Attempted to reach patient for transitions of care follow up. Unable to reach patient.    Outreach Attempts:   1ST CTC attempt to reach Pt regarding recent hospital discharge.  CTC left voice recording with call back number requesting a call back. Will attempt to reach patient again.    Patient: Erinn Kulkarni    Patient : 1980   MRN: [id number]      Reason for Admission: HTN Emergency, upper ABD pain, ESRD with HD  Discharge Date: 25     RURS: Readmission Risk Score: 20.3    Last Discharge Facility       Date Complaint Diagnosis Description Type Department Provider    25 Abdominal Pain Nausea and vomiting, unspecified vomiting type ... ED to Hosp-Admission (Discharged) (ADMITTED) [facility]W Lindsay Lopez MD; Rubin,...            Was this an external facility discharge? No    Follow Up Appointment:   Patient does not have a follow up appointment scheduled at time of call. Unable to reach patients  Future Appointments         Provider Specialty Dept Phone    2025 12:30 PM Bruno Villar MD Nephrology [phone]            Plan for follow-up on next business day.      Thank You,    Amanda Sutherland RN  Care Transition Coordinator  Contact Number:[phone]

## 2025-07-14 NOTE — CARE COORDINATION
Care Transitions Note    Initial Call - Call within 2 business days of discharge: Yes    Patient Current Location:  Home: [address] [place] [address] [place] [address]    Care Transition Nurse contacted the family, sister Christa by telephone to perform post hospital discharge assessment, verified name and  as identifiers.  Provided introduction to self, and explanation of the Care Transition Nurse role.    Patient: Erinn Kulkarni    Patient : 1980   MRN: [id number]      Reason for Admission:  HTN Emergency, upper ABD pain, ESRD with HD   Discharge Date: 25     RURS: Readmission Risk Score: 20.3      Last Discharge Facility       Date Complaint Diagnosis Description Type Department Provider    25 Abdominal Pain Nausea and vomiting, unspecified vomiting type ... ED to Hosp-Admission (Discharged) (ADMITTED) [facility] Lindsay Lopez MD; Rubin,...            Was this an external facility discharge? No    Additional needs identified to be addressed with provider   High priority: FYI- CTN spoke with patients sister this afternoon for follow up CTN call. Sister states patient was doing well over the weekend, but last night she started with increased swelling to BLE's, not as bad as what got her back to the hospital, but it is significantly present.  BP overnight was also 169/101.  Patient with no SOB/SOBE, no chest pain or ABD pain, she is elevating legs on pillows as well.   Sister would like to know what to do, patient has HFU with your office on 2025, and HD tomorrow.  Please advise sister Christa at [phone].  CTN did receive a call back from Patient, while finishing up this note.  States BP this morning was 143-82, BLE Edema is down a lot, CTN did again, go over low sodium diet briefly.               Method of communication with provider: chart routing.    Patients top risk factors for readmission: medical condition-HTN Emergency, upper ABD pain, ESRD with HD     Interventions to

## 2025-07-15 ENCOUNTER — TELEPHONE (OUTPATIENT)
Dept: PRIMARY CARE CLINIC | Age: 45
End: 2025-07-15

## 2025-07-18 ENCOUNTER — CARE COORDINATION (OUTPATIENT)
Dept: CASE MANAGEMENT | Age: 45
End: 2025-07-18

## 2025-07-18 NOTE — CARE COORDINATION
Care Transitions Note    Follow Up Call     Patient Current Location:  Home:  [address] [place] [address]    Care Transition Nurse contacted the family, Sister Christa by telephone. Verified name and  as identifiers.    Additional needs identified to be addressed with provider   No needs identified                 Method of communication with provider: none.    Care Summary Note: CTN spoke with patients sister Christa this afternoon for follow up CTN call.   Sister states patient is doing much better, states she is no longer having any BLE Edema, states BP's are down significantly as well.  Per sister, message CTN sent to Nephrologist was helpful, patient was able to be seen by Nephrologist at time of HD on Tuesday, along with him changes her treatment a bit.  No reports of any fever chills, nausea, vomiting, chest pain, SOB or cough.   Patient with no congestion, pain, difficulty emptying bladder, LE edema, feeling lightheaded, dizziness, and heart palpitations.      Plan of care updates since last contact:  Education: patient instructed to continue to monitor for any returning BLE Edema, elevated BP's, ABD pain, reporting to MD immediately.   Review of patient management of conditions/medications: make sure to rest as needed, taking all medications as prescribed.  Make sure to keep all HD chair times, follow up with PCP as instructed.       Advance Care Planning:   Does patient have an Advance Directive: reviewed during previous call, see note.  and   Primary Decision Maker: MARJAN SHANKAR - Parent - [phone]    Secondary Decision Maker: Christa Shankar - Brother/Sister - [phone] .    Medication Review:  No changes since last call.     Assessments:  Care Transitions Subsequent and Final Call    Subsequent and Final Calls  Do you have any ongoing symptoms?: No  Have your medications changed?: No  Do you have any questions related to your medications?: No  Do you currently have any active

## 2025-07-25 ENCOUNTER — CARE COORDINATION (OUTPATIENT)
Dept: CASE MANAGEMENT | Age: 45
End: 2025-07-25

## 2025-07-28 ENCOUNTER — CARE COORDINATION (OUTPATIENT)
Dept: CARE COORDINATION | Age: 45
End: 2025-07-28

## 2025-07-28 NOTE — CARE COORDINATION
Care Transitions Note    Follow Up Call     Patient Current Location:  Home:  [address] [place] [address]    Care Transition Nurse contacted the patient by telephone. Verified name and  as identifiers.    Additional needs identified to be addressed with provider   No needs identified                 Method of communication with provider: none.    Care Summary Note: States she's doing well. Denies CP, palpitations, SOB, lightheadedness, dizziness, headaches, vision changes, or other symptoms or concerns. Reports mild BLE swelling that goes down when she elevates her legs. BP yesterday 158/96. Reports taking medications as prescribed. She has nephrology f/u appt ; agrees to bring BP record. Attending HD as scheduled and denies any issues or concerns. No questions or needs at this time.     Plan of care updates since last contact:  Education:    Review of patient management of conditions/medications:         Advance Care Planning:   Does patient have an Advance Directive: reviewed during previous call, see note. .    Medication Review:  No changes since last call.     Remote Patient Monitoring:  Offered patient enrollment in the Remote Patient Monitoring (RPM) program for in-home monitoring: Yes, but did not enroll at this time: already monitoring with home equipment.    Assessments:  Care Transitions Subsequent and Final Call    Subsequent and Final Calls  Care Transitions Interventions  Other Interventions:              Follow Up Appointment:   Reviewed upcoming appointment(s).  Future Appointments         Provider Specialty Dept Phone    2025 12:30 PM Bruno Villar MD Nephrology [phone]            Care Transition Nurse provided contact information.  Plan for follow-up call in 2-5 days based on severity of symptoms and risk factors.  Plan for next call: symptom management-   self management-      Ashleigh Martin

## 2025-07-31 ENCOUNTER — CARE COORDINATION (OUTPATIENT)
Dept: CASE MANAGEMENT | Age: 45
End: 2025-07-31

## 2025-07-31 NOTE — CARE COORDINATION
Care Transitions Note    Follow Up Call     Patient Current Location:  Home:  [address] [place] [address]    Care Transition Nurse contacted the patient by telephone. Verified name and  as identifiers.    Additional needs identified to be addressed with provider   No needs identified                 Method of communication with provider: none.    Care Summary Note: CTN spoke with patient this afternoon for follow up CTN call.  Patient states she is doing okay, states BP's are still running on the higher side, at night, and then get better.  Running 150's/80-90's.  Patient states she is not having any fever chills, nausea, vomiting, chest pain, SOB or cough.    Swelling in legs is gone as well.  Patient with no congestion, pain, difficulty emptying bladder, feeling lightheaded, dizziness, and heart palpitations.  Patient states she had F/U with Nephrologist today, no new or changed medications, but states he will likely change or add some, per patient.  She is keeping a log of BP's and calling them into nephrologist office.  No other issues or concerns at this time.    Plan of care updates since last contact:  Education: patient instructed to continue to monitor for any returning BLE Edema, any weakness or fatigue, reporting to MD immediately.  Review of patient management of conditions/medications: make sure to rest as needed, taking all medications as prescribed.         Advance Care Planning:   Does patient have an Advance Directive: reviewed during previous call, see note.  and   Primary Decision Maker: MARJAN SHANKAR - Parent - [phone]    Secondary Decision Maker: Christa Shankar - Brother/Sister - [phone] .    Medication Review:  No changes since last call.     Assessments:  Care Transitions Subsequent and Final Call    Schedule Follow Up Appointment with PCP: Completed  Subsequent and Final Calls  Do you have any ongoing symptoms?: No  Have your medications changed?: No  Do you have

## 2025-08-05 ENCOUNTER — TELEPHONE (OUTPATIENT)
Dept: VASCULAR SURGERY | Age: 45
End: 2025-08-05

## 2025-08-05 DIAGNOSIS — N18.6 ESRD (END STAGE RENAL DISEASE) (HCC): Primary | ICD-10-CM

## 2025-08-06 ENCOUNTER — CARE COORDINATION (OUTPATIENT)
Dept: CASE MANAGEMENT | Age: 45
End: 2025-08-06

## 2025-08-13 ENCOUNTER — CARE COORDINATION (OUTPATIENT)
Dept: CASE MANAGEMENT | Age: 45
End: 2025-08-13

## 2025-08-19 ENCOUNTER — OFFICE VISIT (OUTPATIENT)
Dept: VASCULAR SURGERY | Age: 45
End: 2025-08-19
Payer: MEDICARE

## 2025-08-19 VITALS
HEIGHT: 63 IN | BODY MASS INDEX: 28 KG/M2 | WEIGHT: 158 LBS | DIASTOLIC BLOOD PRESSURE: 84 MMHG | SYSTOLIC BLOOD PRESSURE: 140 MMHG

## 2025-08-19 DIAGNOSIS — N18.6 ESRD (END STAGE RENAL DISEASE) (HCC): Primary | ICD-10-CM

## 2025-08-19 PROCEDURE — 3079F DIAST BP 80-89 MM HG: CPT | Performed by: SURGERY

## 2025-08-19 PROCEDURE — 99204 OFFICE O/P NEW MOD 45 MIN: CPT | Performed by: SURGERY

## 2025-08-19 PROCEDURE — 3077F SYST BP >= 140 MM HG: CPT | Performed by: SURGERY

## 2025-08-19 RX ORDER — SEVELAMER HYDROCHLORIDE 800 MG/1
800 TABLET, FILM COATED ORAL
COMMUNITY

## 2025-08-20 ENCOUNTER — TELEPHONE (OUTPATIENT)
Dept: VASCULAR SURGERY | Age: 45
End: 2025-08-20

## (undated) DEVICE — FORMALIN CLEAR VIAL 20 ML 10%

## (undated) DEVICE — FORCEPS BX 240CM 2.4MM L NDL RAD JAW 4 M00513334

## (undated) DEVICE — BITE BLOCK ENDOSCP AD 60 FR W/ ADJ STRP PLAS GRN BLOX

## (undated) DEVICE — ENDOSCOPY KIT: Brand: MEDLINE INDUSTRIES, INC.